# Patient Record
Sex: FEMALE | Race: WHITE | NOT HISPANIC OR LATINO | Employment: UNEMPLOYED | URBAN - NONMETROPOLITAN AREA
[De-identification: names, ages, dates, MRNs, and addresses within clinical notes are randomized per-mention and may not be internally consistent; named-entity substitution may affect disease eponyms.]

---

## 2017-01-03 ENCOUNTER — OFFICE VISIT (OUTPATIENT)
Dept: NEUROLOGY | Facility: CLINIC | Age: 52
End: 2017-01-03

## 2017-01-03 VITALS
BODY MASS INDEX: 26.8 KG/M2 | SYSTOLIC BLOOD PRESSURE: 112 MMHG | HEART RATE: 78 BPM | WEIGHT: 157 LBS | HEIGHT: 64 IN | DIASTOLIC BLOOD PRESSURE: 70 MMHG | OXYGEN SATURATION: 97 %

## 2017-01-03 DIAGNOSIS — G44.229 CHRONIC TENSION-TYPE HEADACHE, NOT INTRACTABLE: ICD-10-CM

## 2017-01-03 DIAGNOSIS — R42 VERTIGO: Primary | ICD-10-CM

## 2017-01-03 PROCEDURE — 99214 OFFICE O/P EST MOD 30 MIN: CPT | Performed by: NURSE PRACTITIONER

## 2017-01-03 RX ORDER — MECLIZINE HYDROCHLORIDE 25 MG/1
25 TABLET ORAL 3 TIMES DAILY PRN
Status: SHIPPED | OUTPATIENT
Start: 2017-01-03

## 2017-01-03 RX ORDER — CHOLECALCIFEROL (VITAMIN D3) 50 MCG
2000 TABLET ORAL DAILY
COMMUNITY

## 2017-01-03 RX ORDER — TIZANIDINE 4 MG/1
4 TABLET ORAL NIGHTLY PRN
Qty: 30 TABLET | Refills: 3 | Status: SHIPPED | OUTPATIENT
Start: 2017-01-03 | End: 2020-08-26

## 2017-01-03 RX ORDER — LISINOPRIL 10 MG/1
10 TABLET ORAL DAILY
COMMUNITY
Start: 2016-12-27 | End: 2021-06-21 | Stop reason: HOSPADM

## 2017-01-03 RX ORDER — AMITRIPTYLINE HYDROCHLORIDE 10 MG/1
10 TABLET, FILM COATED ORAL NIGHTLY
Qty: 30 TABLET | Refills: 4 | Status: SHIPPED | OUTPATIENT
Start: 2017-01-03 | End: 2020-08-26

## 2017-01-03 NOTE — PROGRESS NOTES
Subjective   Shilpa Vides is a 51 y.o. female     Chief Complaint   Patient presents with   • Headache     PRESENT TIMES MANY YEARS. STATES THAT SHE HAS NOT HAD ANY RECENT MEDS.   • Dizziness     STATES THAT DIZZINESS PRESENT X APPROX 1 MO. REPORTS THAT SHE HAS BEEN GIVEN MECLIZINE, IT HELPS SOME. REPORTS THAT SHE HAS HAD RECENT SCANS OF BRAIN AND HEART WORK UP, ALL NEG.        History of Present Illness     Pt is a 51-year-old female in clinic today to establish with [facility] at [facility].  Patient states that she has had headaches for many years they've been onset for greater than 20 years.  She states that they always begin in her neck and radiate up into her head.  She has one daily wakes with a headache.  She has changed her pillows out without any improvement.  States now approximately a month ago she developed dizziness with increased nausea.  She states that it's constant and worse when she lies down.  ET scan here at [facility] and it was negative for any acute intracranial issues.  Patient also had imaging done at [facility] but those results aren't in yet.  Patient states all studies came out negative including her cardiac workup.  Patient does complain of sleep apnea symptoms.  Her [place] score was 14 she does state that she snores and has apneic episodes which her  has witnessed.  Patient states headaches always begin in the back of her neck and she did have a back injury and has had headaches for 20+ years daily pressure from the neck up to the front.  She has switched taking in pillows.  Patient also states that the dizziness she notices is here with her chronic sinusitis and often has to take Augmentin she states currently her smell is changed again.  She will treat with inhalers and no spray and states that she'll see her primary care if it doesn't improve.    Patient has what she states is chronic sinusitis she states that she has pressure in her sinuses with  "a change in her ability to spell.  As been using nasal spray as well as an inhaler.  States that she has been on Augmentin with no improvement.  She does also have decreased and blurred vision however she states that she just learned to her ophthalmologist that she has cataracts.    The following portions of the patient's history were reviewed and updated as appropriate: allergies, current medications, past family history, past medical history, past social history, past surgical history and problem list.    Review of Systems   Constitutional: Positive for activity change and fatigue. Negative for chills and fever.   HENT: Positive for rhinorrhea, sinus pressure, sneezing and tinnitus. Negative for congestion, ear pain, hearing loss and sore throat.    Eyes: Negative for pain, discharge and redness.   Respiratory: Positive for shortness of breath. Negative for cough, wheezing and stridor.    Cardiovascular: Negative.  Negative for chest pain, palpitations and leg swelling.   Gastrointestinal: Negative for abdominal pain, constipation, nausea and vomiting.   Endocrine: Negative for cold intolerance, heat intolerance and polyphagia.   Genitourinary: Negative.  Negative for dysuria, flank pain, frequency and urgency.   Musculoskeletal: Positive for arthralgias, myalgias, neck pain and neck stiffness. Negative for joint swelling.   Skin: Negative for pallor, rash and wound.   Allergic/Immunologic: Negative for environmental allergies.   Neurological: Positive for dizziness, light-headedness and headaches. Negative for tremors, seizures, syncope, facial asymmetry, speech difficulty, weakness and numbness.   Hematological: Negative for adenopathy.   Psychiatric/Behavioral: Negative for confusion and hallucinations. The patient is nervous/anxious.        Objective         Vitals:    01/03/17 0955   BP: 112/70   Pulse: 78   SpO2: 97%   Weight: 157 lb (71.2 kg)   Height: 64\" (162.6 cm)     GENERAL: Patient is pleasant, " cooperative, appears to be stated age.  Body habitus is endomorphic.  SKIN AND EXTREMITIES:  No skin rashes or lesions are noted.  No cyanosis, clubbing or edema of the extremities.    HEAD:  Head is normocephalic and atraumatic.    NECK: Neck are tender posteriorly without thyromegaly or adenopathy.  Carotic upstrokes are 1+/4.  No cranial or cervical bruits.  The neck is supple with a full range of motion.   CARDIOVASCULAR:  Regular rate and rhythm with normal S1 and S2 without rub or gallop.  RESPIRATORY:  Clear to auscultation without wheezes or crackle   ABDOMEN:  Soft and non-tender, positive bowel sound without hepatosplenomegaly  BACK:  Back is straight without midline defect.    PSYCH:  Higher cortical function/mental status:  The patient is alert.  She is oriented x3 to time, place and person.  Recent and the remote memory appear normal.  The patient has a good fund of knowledge.  There is no visual or auditory hallucination or suicidal or homicidal ideation.  SPEECH:There is no gross evidence of aphasia, dysarthria or agnosia.      CRANIAL NERVES:    Visual fields are intact to confrontation testing. + nystagmus noted upon laying down .  The face is symmetric. palate elevate symmetrically, Tongue midline The remainder of the cranial nerves are intact and symmetrical.    MOTOR: Strength is 5/5 throughout with normal tone and bulk with the following exceptions, 4/5 intrinsic muscles of the hands and feet.  No involuntary movements noted.  + tenderness palp cervical and Bilat shoulder with palp  DTR: are 2/4 and symmetrical in the upper extremities, 2/4 and symmetrical at the knees  SENSATION:  Intact to pinprick, light touch, vibration and proprioception.  Coordination:  The patient normally performs finger-nose-finger, heel-to-knee-to-shin and rapid alternating movements in symmetrical fashion.    MUSCULOSKELETAL: Range of motion normal, no clubbing, cyanosis, or edema.  No joint swelling.     Results for  orders placed or performed during the hospital encounter of 17   Basic Metabolic Panel   Result Value Ref Range    Sodium 144 137 - 145 mmol/L    Potassium 4.1 3.5 - 5.1 mmol/L    Chloride 105 98 - 107 mmol/L    CO2 25 (L) 26 - 30 mmol/L    Anion Gap 18 10 - 20 mmol/L    BUN 18 7 - 20 mg/dL    Creatinine 0.6 0.6 - 1.3 mg/dL    eGFR 105 mL/min    Glucose 111 (H) 74 - 98 mg/dL    Calcium 9.8 8.4 - 10.2 mg/dL   Troponin   Result Value Ref Range    Troponin I <0.01 0.00 - 0.05 ng/mL   Troponin   Result Value Ref Range    Troponin I <0.01 0.00 - 0.05 ng/mL   CBC & Differential   Result Value Ref Range    WBC 7.7 4.8 - 10.8 THOUS    RBC 5.03 4.20 - 5.40 m/uL    Hemoglobin 15.3 12.0 - 16.0 g/dL    Hematocrit 45 37 - 47 %    MCV 90.3 81.0 - 99.0 fL    MCH 30.4 27.0 - 31.0 uug    MCHC 33.7 30.0 - 37.0 g/dL    RDW 13.1 11.5 - 14.5 %    Platelets 211 130 - 400 THOUS    Neutrophil Rel % 58.2 37.0 - 80.0 %    Lymphocyte Rel % 32.6 10.0 - 50.0 %    Monocyte Rel % 6.6 0.0 - 12.0 %    Eosinophil Rel % 1.6 0.0 - 7.0 %    Basophil Rel % 0.70 0.00 - 2.50 %    Immature Granulocyte Rel % 0.30 0.00 - 2.50 %    Neutrophils Absolute 4.48 2.00 - 6.90 THOUS    Lymphocytes Absolute 2.51 0.60 - 3.40 THOUS    Monocytes Absolute 0.51 0.00 - 0.90 THOUS    Eosinophils Absolute 0.12 0.00 - 0.70 THOUS    Basophils Absolute 0.05 0.00 - 0.20 THOUS    Abs Imm Gran 0.02 0.00 - 0.60 THOUS       Xr Chest Pa & Lateral    Result Date: 1/3/2017  Narrative:  [address] [address] [phone]     Diagnostic Imaging Report    [id number]     Signed    PATIENT NAME:  RUMA COYLE MRN:  [id number]  :  1965 ACCT#:  [id number]  ATTENDING:   DATE OF EXAM:  17  PRIMARY CARE:   RAVI CABALLERO LOCATION:  ED    ORDERING PHYSICIAN:  ANSHUL JEAN-BAPTISTE  PROCEDURE(s):  CHEST PA   LAT  ORDER NUMBER(s):  [id number]    CC:   RAVI CABALLERO;  ADEBAYO JEAN-BAPTISTE         PROCEDURE: CHEST PA   LAT-           HISTORY: chest congestion       COMPARISON: May 24, 2016.       FINDINGS: The heart is normal in size. The mediastinum is unremarkable.   The lungs are clear. There is no pneumothorax. There are no acute   osseous abnormalities.          IMPRESSION- No acute cardiopulmonary process.              This report was finalized on 1/3/2017 8:22 AM by Anahy Lugo M.D..         DICTATED BY:      ANAHY LUGO MD  DICTATED DATE/TIME:      01/03/17 0821  TRANSCRIBED DATE/TIME:      01/03/17 0822    CC: ANSHUL JEAN-BAPTISTE PA-C; RAVI RASMUSSEN        I have personally reviewed the above labs and imaging studies.    Assessment/Plan      1. Tension headaches: plan trigger point injection 1-2 weeks.     I had a long discussion with the patient regarding treatment options.  We agreed on prophylactic medication treatment and an abortive medication option.  The patient is to avoid analgesic overuse by not using abortive OTC medications more often than once a week.  We decided on the following elavil, trigger point  The patient is to contact the office if side effects occur or if an effective medication dose is found to obtain a script.  We provided samples for the titration schedule and informed about common side effects.    If this approach is ineffective we will consider Botox injections given that the headaches occur on more than 15 days out of the month and last longer than 4 hours each.    2 Vertigo:  With hx sinusitis:  Continue inhaler/nasal spray and meclizine from [facility].  Rx PT for VRT.    3. Cervicalgia/spasms: RX elavil with zanaflex    4. LASHAE:  Pt declines sleep study @ this time    I have counseled patient extensively on Sleep Hygiene including regular sleep wake schedule and stimulus control therapy. I have also discussed the importance of weight reduction because 10% reduction in body weight can reduce sleep apnea by 50 %. We have also discussed abstaining from smoking and drinking.  I have explained to patient  that obstructive apnea episode is defined as the absence of airflow for at least 10 seconds.  Sleep apnea is usually accompanied by snoring, disturbed sleep, and daytime sleepiness. Patients with micrognathia, retrognathia, enlarged tonsils, tongue enlargement, and acromegaly are especially predisposed to obstructive sleep apnea. Abnormalities or weakness in the muscles can also contribute to obstructive sleep apnea. Obesity can also contribute to sleep apnea.  Sleep apnea can lead to a number of complications, ranging from daytime sleepiness to possible increased risk of cardiovascular risks.    Daytime sleepiness is the most serious. Daytime sleepiness can also increase the risk for accident-related injuries. Several studies have suggested that people with sleep apnea have two to three times as many car accidents, and five to seven times the risk for multiple accidents.    A number of cardiovascular diseases -- including high blood pressure, heart failure, stroke, and heart arrhythmias -- have an association with obstructive sleep apnea.    Up to a third of patients with heart failure also have sleep apnea. Both central and obstructive sleep apnea are linked with heart failure. Obstructive sleep apnea is also noted to be associated with type 2 diabetes according to Dr. Ingram at [facility].  The best treatment for symptomatic obstructive sleep apnea is continuous positive airflow pressure (CPAP). Bilevel positive airway pressure (BPAP) systems may be particularly helpful for patients with coexisting lung disease and those with excessive levels of carbon dioxide.  Other treatment options including UPPP surgery, LAUP surgery, radiofrequency somnoplasty and dental appliances such Gideon or Clearway.

## 2017-01-03 NOTE — MR AVS SNAPSHOT
Shilpa SINHA Peewee   1/3/2017 10:00 AM   Office Visit    Dept Phone:  [phone]   Encounter #:  [id number]    Provider:  RAVI Martini   Department:  [facility] NEUROLOGY                Your Full Care Plan              Today's Medication Changes          These changes are accurate as of: 1/3/17 10:40 AM.  If you have any questions, ask your nurse or doctor.               New Medication(s)Ordered:     amitriptyline 10 MG tablet   Commonly known as:  ELAVIL   Take 1 tablet by mouth Every Night.       tiZANidine 4 MG tablet   Commonly known as:  ZANAFLEX   Take 1 tablet by mouth At Night As Needed for muscle spasms.            Where to Get Your Medications      These medications were sent to [facility] [address] [place] [address] - [phone]  - [phone] [address] [address]     Phone:  [phone]     amitriptyline 10 MG tablet    tiZANidine 4 MG tablet                  Your Updated Medication List          This list is accurate as of: 1/3/17 10:40 AM.  Always use your most recent med list.                amitriptyline 10 MG tablet   Commonly known as:  ELAVIL   Take 1 tablet by mouth Every Night.       aspirin 81 MG EC tablet       lisinopril 10 MG tablet   Commonly known as:  PRINIVIL,ZESTRIL       pantoprazole 40 MG EC tablet   Commonly known as:  PROTONIX   1 po daily in the am 30 minutes before breakfast       pravastatin 40 MG tablet   Commonly known as:  PRAVACHOL       tiZANidine 4 MG tablet   Commonly known as:  ZANAFLEX   Take 1 tablet by mouth At Night As Needed for muscle spasms.       Vitamin D 2000 UNITS tablet               We Performed the Following     Ambulatory Referral to Physical Therapy Evaluate and treat       You Were Diagnosed With        Codes Comments    Vertigo    -  Primary ICD-10-CM: R42  ICD-9-CM: 780.4     Chronic tension-type headache, not intractable     ICD-10-CM: G44.229  ICD-9-CM:  "339.12       Medications to be Given to You by a Medical Professional     Due       Frequency    (none) meclizine (ANTIVERT) tablet 25 mg  3 Times Daily PRN      Instructions     None    Patient Instructions History      Upcoming Appointments     Visit Type Date Time Department    NEW PATIENT 1/3/2017 10:00 AM [facility]UR[facility]      MyChart Signup     Our records indicate that you have declined [facility]. If you would like to sign up for [employer], please email [email or web address] or call [phone] to obtain an activation code.             Other Info from Your Visit           Allergies     Flagyl [Metronidazole]  Nausea And Vomiting    Metoprolol        Reason for Visit     Headache PRESENT TIMES MANY YEARS. STATES THAT SHE HAS NOT HAD ANY RECENT MEDS.    Dizziness STATES THAT DIZZINESS PRESENT X APPROX 1 MO. REPORTS THAT SHE HAS BEEN GIVEN MECLIZINE, IT HELPS SOME. REPORTS THAT SHE HAS HAD RECENT SCANS OF BRAIN AND HEART WORK UP, ALL NEG.       Vital Signs     Blood Pressure Pulse Height Weight Oxygen Saturation Body Mass Index    112/70 78 64\" (162.6 cm) 157 lb (71.2 kg) 97% 26.95 kg/m2    Smoking Status                   Never Smoker           Problems and Diagnoses Noted     Vertigo    -  Primary    Chronic tension-type headache, not intractable            "

## 2017-01-11 ENCOUNTER — TRANSCRIBE ORDERS (OUTPATIENT)
Dept: MAMMOGRAPHY | Facility: HOSPITAL | Age: 52
End: 2017-01-11

## 2017-01-11 DIAGNOSIS — Z13.9 SCREENING: Primary | ICD-10-CM

## 2017-01-24 ENCOUNTER — HOSPITAL ENCOUNTER (OUTPATIENT)
Dept: MAMMOGRAPHY | Facility: HOSPITAL | Age: 52
Discharge: HOME | End: 2017-01-24
Admitting: NURSE PRACTITIONER

## 2017-01-24 DIAGNOSIS — Z13.9 SCREENING: ICD-10-CM

## 2017-01-24 PROCEDURE — G0202 SCR MAMMO BI INCL CAD: HCPCS

## 2017-01-24 PROCEDURE — 77063 BREAST TOMOSYNTHESIS BI: CPT

## 2017-05-30 ENCOUNTER — APPOINTMENT (OUTPATIENT)
Dept: GENERAL RADIOLOGY | Facility: HOSPITAL | Age: 52
End: 2017-05-30

## 2017-05-30 ENCOUNTER — HOSPITAL ENCOUNTER (EMERGENCY)
Facility: HOSPITAL | Age: 52
Discharge: HOME | End: 2017-05-30
Attending: EMERGENCY MEDICINE | Admitting: EMERGENCY MEDICINE

## 2017-05-30 VITALS
WEIGHT: 150 LBS | DIASTOLIC BLOOD PRESSURE: 65 MMHG | SYSTOLIC BLOOD PRESSURE: 124 MMHG | BODY MASS INDEX: 25.61 KG/M2 | OXYGEN SATURATION: 98 % | RESPIRATION RATE: 18 BRPM | HEIGHT: 64 IN | TEMPERATURE: 97.8 F | HEART RATE: 71 BPM

## 2017-05-30 DIAGNOSIS — R07.9 CHEST PAIN, UNSPECIFIED TYPE: Primary | ICD-10-CM

## 2017-05-30 LAB
ALBUMIN SERPL-MCNC: 4.5 G/DL (ref 3.5–5)
ALBUMIN/GLOB SERPL: 1.5 G/DL (ref 1–2)
ALP SERPL-CCNC: 77 U/L (ref 38–126)
ALT SERPL W P-5'-P-CCNC: 22 U/L (ref 13–69)
ANION GAP SERPL CALCULATED.3IONS-SCNC: 19.1 MMOL/L
AST SERPL-CCNC: 26 U/L (ref 15–46)
BASOPHILS # BLD AUTO: 0.04 10*3/MM3 (ref 0–0.2)
BASOPHILS NFR BLD AUTO: 0.5 % (ref 0–2.5)
BILIRUB SERPL-MCNC: 0.4 MG/DL (ref 0.2–1.3)
BUN BLD-MCNC: 17 MG/DL (ref 7–20)
BUN/CREAT SERPL: 24.3 (ref 7.1–23.5)
CALCIUM SPEC-SCNC: 10.2 MG/DL (ref 8.4–10.2)
CHLORIDE SERPL-SCNC: 102 MMOL/L (ref 98–107)
CO2 SERPL-SCNC: 26 MMOL/L (ref 26–30)
CREAT BLD-MCNC: 0.7 MG/DL (ref 0.6–1.3)
DEPRECATED RDW RBC AUTO: 40.9 FL (ref 37–54)
EOSINOPHIL # BLD AUTO: 0.1 10*3/MM3 (ref 0–0.7)
EOSINOPHIL NFR BLD AUTO: 1.3 % (ref 0–7)
ERYTHROCYTE [DISTWIDTH] IN BLOOD BY AUTOMATED COUNT: 12.5 % (ref 11.5–14.5)
GFR SERPL CREATININE-BSD FRML MDRD: 88 ML/MIN/1.73
GLOBULIN UR ELPH-MCNC: 3.1 GM/DL
GLUCOSE BLD-MCNC: 106 MG/DL (ref 74–98)
HCT VFR BLD AUTO: 46.5 % (ref 37–47)
HGB BLD-MCNC: 15.2 G/DL (ref 12–16)
HOLD SPECIMEN: NORMAL
HOLD SPECIMEN: NORMAL
IMM GRANULOCYTES # BLD: 0.02 10*3/MM3 (ref 0–0.06)
IMM GRANULOCYTES NFR BLD: 0.3 % (ref 0–0.6)
LYMPHOCYTES # BLD AUTO: 2.44 10*3/MM3 (ref 0.6–3.4)
LYMPHOCYTES NFR BLD AUTO: 31 % (ref 10–50)
MCH RBC QN AUTO: 29.5 PG (ref 27–31)
MCHC RBC AUTO-ENTMCNC: 32.7 G/DL (ref 30–37)
MCV RBC AUTO: 90.3 FL (ref 81–99)
MONOCYTES # BLD AUTO: 0.57 10*3/MM3 (ref 0–0.9)
MONOCYTES NFR BLD AUTO: 7.2 % (ref 0–12)
NEUTROPHILS # BLD AUTO: 4.7 10*3/MM3 (ref 2–6.9)
NEUTROPHILS NFR BLD AUTO: 59.7 % (ref 37–80)
NRBC BLD MANUAL-RTO: 0 /100 WBC (ref 0–0)
PLATELET # BLD AUTO: 222 10*3/MM3 (ref 130–400)
PMV BLD AUTO: 9.4 FL (ref 6–12)
POTASSIUM BLD-SCNC: 4.1 MMOL/L (ref 3.5–5.1)
PROT SERPL-MCNC: 7.6 G/DL (ref 6.3–8.2)
RBC # BLD AUTO: 5.15 10*6/MM3 (ref 4.2–5.4)
SODIUM BLD-SCNC: 143 MMOL/L (ref 137–145)
TROPONIN I SERPL-MCNC: 0 NG/ML (ref 0–0.05)
TROPONIN I SERPL-MCNC: <0.012 NG/ML (ref 0–0.03)
TROPONIN I SERPL-MCNC: <0.012 NG/ML (ref 0–0.03)
WBC NRBC COR # BLD: 7.87 10*3/MM3 (ref 4.8–10.8)
WHOLE BLOOD HOLD SPECIMEN: NORMAL
WHOLE BLOOD HOLD SPECIMEN: NORMAL

## 2017-05-30 PROCEDURE — 84484 ASSAY OF TROPONIN QUANT: CPT | Performed by: PHYSICIAN ASSISTANT

## 2017-05-30 PROCEDURE — 99284 EMERGENCY DEPT VISIT MOD MDM: CPT

## 2017-05-30 PROCEDURE — 80053 COMPREHEN METABOLIC PANEL: CPT | Performed by: PHYSICIAN ASSISTANT

## 2017-05-30 PROCEDURE — 71010 HC CHEST PA OR AP: CPT

## 2017-05-30 PROCEDURE — 93005 ELECTROCARDIOGRAM TRACING: CPT | Performed by: PHYSICIAN ASSISTANT

## 2017-05-30 PROCEDURE — 93005 ELECTROCARDIOGRAM TRACING: CPT | Performed by: EMERGENCY MEDICINE

## 2017-05-30 PROCEDURE — 85025 COMPLETE CBC W/AUTO DIFF WBC: CPT | Performed by: PHYSICIAN ASSISTANT

## 2017-05-30 RX ORDER — SODIUM CHLORIDE 0.9 % (FLUSH) 0.9 %
10 SYRINGE (ML) INJECTION AS NEEDED
Status: DISCONTINUED | OUTPATIENT
Start: 2017-05-30 | End: 2017-05-31 | Stop reason: HOSPADM

## 2017-05-30 RX ORDER — ASPIRIN 325 MG
325 TABLET ORAL ONCE
Status: COMPLETED | OUTPATIENT
Start: 2017-05-30 | End: 2017-05-30

## 2017-05-30 RX ADMIN — ASPIRIN 325 MG ORAL TABLET 325 MG: 325 PILL ORAL at 18:00

## 2019-03-30 ENCOUNTER — APPOINTMENT (OUTPATIENT)
Dept: CT IMAGING | Facility: HOSPITAL | Age: 54
End: 2019-03-30

## 2019-03-30 ENCOUNTER — APPOINTMENT (OUTPATIENT)
Dept: GENERAL RADIOLOGY | Facility: HOSPITAL | Age: 54
End: 2019-03-30

## 2019-03-30 ENCOUNTER — HOSPITAL ENCOUNTER (EMERGENCY)
Facility: HOSPITAL | Age: 54
Discharge: HOME | End: 2019-03-30
Attending: EMERGENCY MEDICINE | Admitting: EMERGENCY MEDICINE

## 2019-03-30 VITALS
RESPIRATION RATE: 16 BRPM | OXYGEN SATURATION: 99 % | HEART RATE: 77 BPM | HEIGHT: 64 IN | WEIGHT: 138 LBS | BODY MASS INDEX: 23.56 KG/M2 | TEMPERATURE: 98 F | DIASTOLIC BLOOD PRESSURE: 65 MMHG | SYSTOLIC BLOOD PRESSURE: 117 MMHG

## 2019-03-30 DIAGNOSIS — R07.89 ATYPICAL CHEST PAIN: Primary | ICD-10-CM

## 2019-03-30 DIAGNOSIS — R42 VERTIGO: ICD-10-CM

## 2019-03-30 LAB
ALBUMIN SERPL-MCNC: 4.6 G/DL (ref 3.5–5)
ALBUMIN/GLOB SERPL: 1.7 G/DL (ref 1–2)
ALP SERPL-CCNC: 70 U/L (ref 38–126)
ALT SERPL W P-5'-P-CCNC: 27 U/L (ref 13–69)
ANION GAP SERPL CALCULATED.3IONS-SCNC: 15 MMOL/L (ref 10–20)
AST SERPL-CCNC: 23 U/L (ref 15–46)
BASOPHILS # BLD AUTO: 0.05 10*3/MM3 (ref 0–0.2)
BASOPHILS NFR BLD AUTO: 0.8 % (ref 0–1.5)
BILIRUB SERPL-MCNC: 0.6 MG/DL (ref 0.2–1.3)
BUN BLD-MCNC: 12 MG/DL (ref 7–20)
BUN/CREAT SERPL: 20 (ref 7.1–23.5)
CALCIUM SPEC-SCNC: 10.3 MG/DL (ref 8.4–10.2)
CHLORIDE SERPL-SCNC: 105 MMOL/L (ref 98–107)
CO2 SERPL-SCNC: 24 MMOL/L (ref 26–30)
CREAT BLD-MCNC: 0.6 MG/DL (ref 0.6–1.3)
DEPRECATED RDW RBC AUTO: 40.1 FL (ref 37–54)
EOSINOPHIL # BLD AUTO: 0.16 10*3/MM3 (ref 0–0.4)
EOSINOPHIL NFR BLD AUTO: 2.4 % (ref 0.3–6.2)
ERYTHROCYTE [DISTWIDTH] IN BLOOD BY AUTOMATED COUNT: 12.2 % (ref 12.3–15.4)
GFR SERPL CREATININE-BSD FRML MDRD: 104 ML/MIN/1.73
GLOBULIN UR ELPH-MCNC: 2.7 GM/DL
GLUCOSE BLD-MCNC: 101 MG/DL (ref 74–98)
HCT VFR BLD AUTO: 43.6 % (ref 34–46.6)
HGB BLD-MCNC: 14.9 G/DL (ref 12–15.9)
HOLD SPECIMEN: NORMAL
HOLD SPECIMEN: NORMAL
IMM GRANULOCYTES # BLD AUTO: 0.02 10*3/MM3 (ref 0–0.05)
IMM GRANULOCYTES NFR BLD AUTO: 0.3 % (ref 0–0.5)
LYMPHOCYTES # BLD AUTO: 2.42 10*3/MM3 (ref 0.7–3.1)
LYMPHOCYTES NFR BLD AUTO: 36.4 % (ref 19.6–45.3)
MCH RBC QN AUTO: 30.6 PG (ref 26.6–33)
MCHC RBC AUTO-ENTMCNC: 34.2 G/DL (ref 31.5–35.7)
MCV RBC AUTO: 89.5 FL (ref 79–97)
MONOCYTES # BLD AUTO: 0.46 10*3/MM3 (ref 0.1–0.9)
MONOCYTES NFR BLD AUTO: 6.9 % (ref 5–12)
NEUTROPHILS # BLD AUTO: 3.54 10*3/MM3 (ref 1.4–7)
NEUTROPHILS NFR BLD AUTO: 53.2 % (ref 42.7–76)
NRBC BLD AUTO-RTO: 0 /100 WBC (ref 0–0)
PLATELET # BLD AUTO: 231 10*3/MM3 (ref 140–450)
PMV BLD AUTO: 9.4 FL (ref 6–12)
POTASSIUM BLD-SCNC: 4 MMOL/L (ref 3.5–5.1)
PROT SERPL-MCNC: 7.3 G/DL (ref 6.3–8.2)
RBC # BLD AUTO: 4.87 10*6/MM3 (ref 3.77–5.28)
SODIUM BLD-SCNC: 140 MMOL/L (ref 137–145)
TROPONIN I SERPL-MCNC: 0.01 NG/ML (ref 0–0.05)
TROPONIN I SERPL-MCNC: <0.012 NG/ML (ref 0–0.03)
WBC NRBC COR # BLD: 6.65 10*3/MM3 (ref 3.4–10.8)
WHOLE BLOOD HOLD SPECIMEN: NORMAL
WHOLE BLOOD HOLD SPECIMEN: NORMAL

## 2019-03-30 PROCEDURE — 80053 COMPREHEN METABOLIC PANEL: CPT | Performed by: EMERGENCY MEDICINE

## 2019-03-30 PROCEDURE — 93005 ELECTROCARDIOGRAM TRACING: CPT | Performed by: EMERGENCY MEDICINE

## 2019-03-30 PROCEDURE — 85025 COMPLETE CBC W/AUTO DIFF WBC: CPT | Performed by: EMERGENCY MEDICINE

## 2019-03-30 PROCEDURE — 25010000002 ONDANSETRON PER 1 MG: Performed by: EMERGENCY MEDICINE

## 2019-03-30 PROCEDURE — 99284 EMERGENCY DEPT VISIT MOD MDM: CPT

## 2019-03-30 PROCEDURE — 84484 ASSAY OF TROPONIN QUANT: CPT | Performed by: EMERGENCY MEDICINE

## 2019-03-30 PROCEDURE — 70450 CT HEAD/BRAIN W/O DYE: CPT

## 2019-03-30 PROCEDURE — 96374 THER/PROPH/DIAG INJ IV PUSH: CPT

## 2019-03-30 PROCEDURE — 71045 X-RAY EXAM CHEST 1 VIEW: CPT

## 2019-03-30 RX ORDER — SODIUM CHLORIDE 0.9 % (FLUSH) 0.9 %
10 SYRINGE (ML) INJECTION AS NEEDED
Status: DISCONTINUED | OUTPATIENT
Start: 2019-03-30 | End: 2019-03-30 | Stop reason: HOSPADM

## 2019-03-30 RX ORDER — ASPIRIN 325 MG
325 TABLET ORAL ONCE
Status: DISCONTINUED | OUTPATIENT
Start: 2019-03-30 | End: 2019-03-30

## 2019-03-30 RX ORDER — ONDANSETRON 4 MG/1
4 TABLET, FILM COATED ORAL EVERY 6 HOURS
Qty: 12 TABLET | Refills: 0 | Status: SHIPPED | OUTPATIENT
Start: 2019-03-30 | End: 2020-08-26

## 2019-03-30 RX ORDER — MECLIZINE HYDROCHLORIDE 25 MG/1
25 TABLET ORAL 4 TIMES DAILY PRN
Qty: 30 TABLET | Refills: 0 | Status: SHIPPED | OUTPATIENT
Start: 2019-03-30

## 2019-03-30 RX ORDER — ONDANSETRON 2 MG/ML
4 INJECTION INTRAMUSCULAR; INTRAVENOUS ONCE
Status: COMPLETED | OUTPATIENT
Start: 2019-03-30 | End: 2019-03-30

## 2019-03-30 RX ORDER — MECLIZINE HYDROCHLORIDE 25 MG/1
25 TABLET ORAL ONCE
Status: COMPLETED | OUTPATIENT
Start: 2019-03-30 | End: 2019-03-30

## 2019-03-30 RX ADMIN — ONDANSETRON 4 MG: 2 INJECTION INTRAMUSCULAR; INTRAVENOUS at 13:15

## 2019-03-30 RX ADMIN — MECLIZINE HYDROCHLORIDE 25 MG: 25 TABLET ORAL at 13:15

## 2020-07-07 ENCOUNTER — TRANSCRIBE ORDERS (OUTPATIENT)
Dept: ADMINISTRATIVE | Facility: HOSPITAL | Age: 55
End: 2020-07-07

## 2020-07-07 DIAGNOSIS — Z12.31 VISIT FOR SCREENING MAMMOGRAM: Primary | ICD-10-CM

## 2020-08-08 ENCOUNTER — HOSPITAL ENCOUNTER (OUTPATIENT)
Dept: MAMMOGRAPHY | Facility: HOSPITAL | Age: 55
Discharge: HOME | End: 2020-08-08
Admitting: NURSE PRACTITIONER

## 2020-08-08 DIAGNOSIS — Z12.31 VISIT FOR SCREENING MAMMOGRAM: ICD-10-CM

## 2020-08-08 PROCEDURE — 77067 SCR MAMMO BI INCL CAD: CPT

## 2020-08-08 PROCEDURE — 77063 BREAST TOMOSYNTHESIS BI: CPT

## 2020-08-26 ENCOUNTER — LAB (OUTPATIENT)
Dept: LAB | Facility: HOSPITAL | Age: 55
End: 2020-08-26

## 2020-08-26 ENCOUNTER — OFFICE VISIT (OUTPATIENT)
Dept: ENDOCRINOLOGY | Facility: CLINIC | Age: 55
End: 2020-08-26

## 2020-08-26 VITALS
HEART RATE: 68 BPM | WEIGHT: 123 LBS | SYSTOLIC BLOOD PRESSURE: 112 MMHG | OXYGEN SATURATION: 97 % | BODY MASS INDEX: 21.79 KG/M2 | HEIGHT: 63 IN | DIASTOLIC BLOOD PRESSURE: 80 MMHG

## 2020-08-26 DIAGNOSIS — E04.1 SOLITARY THYROID NODULE: ICD-10-CM

## 2020-08-26 DIAGNOSIS — E05.90 HYPERTHYROIDISM: Primary | ICD-10-CM

## 2020-08-26 DIAGNOSIS — R79.89 LOW TSH LEVEL: ICD-10-CM

## 2020-08-26 LAB
ALBUMIN SERPL-MCNC: 4.6 G/DL (ref 3.5–5.2)
ALBUMIN/GLOB SERPL: 1.8 G/DL
ALP SERPL-CCNC: 57 U/L (ref 39–117)
ALT SERPL W P-5'-P-CCNC: 6 U/L (ref 1–33)
ANION GAP SERPL CALCULATED.3IONS-SCNC: 8.3 MMOL/L (ref 5–15)
AST SERPL-CCNC: 15 U/L (ref 1–32)
BASOPHILS # BLD AUTO: 0.04 10*3/MM3 (ref 0–0.2)
BASOPHILS NFR BLD AUTO: 0.6 % (ref 0–1.5)
BILIRUB SERPL-MCNC: 0.3 MG/DL (ref 0–1.2)
BUN SERPL-MCNC: 11 MG/DL (ref 6–20)
BUN/CREAT SERPL: 15.5 (ref 7–25)
CALCIUM SPEC-SCNC: 10.2 MG/DL (ref 8.6–10.5)
CHLORIDE SERPL-SCNC: 104 MMOL/L (ref 98–107)
CO2 SERPL-SCNC: 27.7 MMOL/L (ref 22–29)
CREAT SERPL-MCNC: 0.71 MG/DL (ref 0.57–1)
DEPRECATED RDW RBC AUTO: 39.8 FL (ref 37–54)
EOSINOPHIL # BLD AUTO: 0.12 10*3/MM3 (ref 0–0.4)
EOSINOPHIL NFR BLD AUTO: 1.9 % (ref 0.3–6.2)
ERYTHROCYTE [DISTWIDTH] IN BLOOD BY AUTOMATED COUNT: 12 % (ref 12.3–15.4)
GFR SERPL CREATININE-BSD FRML MDRD: 85 ML/MIN/1.73
GLOBULIN UR ELPH-MCNC: 2.6 GM/DL
GLUCOSE SERPL-MCNC: 83 MG/DL (ref 65–99)
HCT VFR BLD AUTO: 42.5 % (ref 34–46.6)
HGB BLD-MCNC: 14.6 G/DL (ref 12–15.9)
IMM GRANULOCYTES # BLD AUTO: 0.01 10*3/MM3 (ref 0–0.05)
IMM GRANULOCYTES NFR BLD AUTO: 0.2 % (ref 0–0.5)
LYMPHOCYTES # BLD AUTO: 2.27 10*3/MM3 (ref 0.7–3.1)
LYMPHOCYTES NFR BLD AUTO: 35.1 % (ref 19.6–45.3)
MCH RBC QN AUTO: 31.3 PG (ref 26.6–33)
MCHC RBC AUTO-ENTMCNC: 34.4 G/DL (ref 31.5–35.7)
MCV RBC AUTO: 91 FL (ref 79–97)
MONOCYTES # BLD AUTO: 0.36 10*3/MM3 (ref 0.1–0.9)
MONOCYTES NFR BLD AUTO: 5.6 % (ref 5–12)
NEUTROPHILS NFR BLD AUTO: 3.67 10*3/MM3 (ref 1.7–7)
NEUTROPHILS NFR BLD AUTO: 56.6 % (ref 42.7–76)
NRBC BLD AUTO-RTO: 0 /100 WBC (ref 0–0.2)
PLATELET # BLD AUTO: 239 10*3/MM3 (ref 140–450)
PMV BLD AUTO: 9.8 FL (ref 6–12)
POTASSIUM SERPL-SCNC: 5 MMOL/L (ref 3.5–5.2)
PROT SERPL-MCNC: 7.2 G/DL (ref 6–8.5)
RBC # BLD AUTO: 4.67 10*6/MM3 (ref 3.77–5.28)
SODIUM SERPL-SCNC: 140 MMOL/L (ref 136–145)
T3FREE SERPL-MCNC: 3.13 PG/ML (ref 2–4.4)
T4 FREE SERPL-MCNC: 1.32 NG/DL (ref 0.93–1.7)
TSH SERPL DL<=0.05 MIU/L-ACNC: 0.18 UIU/ML (ref 0.27–4.2)
WBC # BLD AUTO: 6.47 10*3/MM3 (ref 3.4–10.8)

## 2020-08-26 PROCEDURE — 99243 OFF/OP CNSLTJ NEW/EST LOW 30: CPT | Performed by: INTERNAL MEDICINE

## 2020-08-26 PROCEDURE — 80053 COMPREHEN METABOLIC PANEL: CPT | Performed by: INTERNAL MEDICINE

## 2020-08-26 PROCEDURE — 85025 COMPLETE CBC W/AUTO DIFF WBC: CPT | Performed by: INTERNAL MEDICINE

## 2020-08-26 PROCEDURE — 86376 MICROSOMAL ANTIBODY EACH: CPT | Performed by: INTERNAL MEDICINE

## 2020-08-26 PROCEDURE — 84481 FREE ASSAY (FT-3): CPT | Performed by: INTERNAL MEDICINE

## 2020-08-26 PROCEDURE — 84445 ASSAY OF TSI GLOBULIN: CPT | Performed by: INTERNAL MEDICINE

## 2020-08-26 PROCEDURE — 84439 ASSAY OF FREE THYROXINE: CPT | Performed by: INTERNAL MEDICINE

## 2020-08-26 PROCEDURE — 84443 ASSAY THYROID STIM HORMONE: CPT | Performed by: INTERNAL MEDICINE

## 2020-08-26 PROCEDURE — 86800 THYROGLOBULIN ANTIBODY: CPT | Performed by: INTERNAL MEDICINE

## 2020-08-26 NOTE — PROGRESS NOTES
Shilpa Vides 54 y.o.  CC:   Chief Complaint   Patient presents with   • Thyroid Problem     New Patient referred by RAVI Hill for evaluation and management of abnormal thyroid testing        [place]: Thyroid Problem (New Patient referred by RAVI Hill for evaluation and management of abnormal thyroid testing )  noted 1-2 months of dry skin and weight loss   Reviewed outside lab work and TSH is low 8/20 was 0.218   Also has had Headache, dizziness and back pain (had MRI yesterday and is awaiting results)  States h/o thyroid nodule - fna benign (in [place])- remote past   Is not on any supplement  Denies thyroid pain   bp is good     Allergies   Allergen Reactions   • Flagyl [Metronidazole] Nausea And Vomiting   • Metoprolol        Current Outpatient Medications:   •  aspirin 81 MG EC tablet, Take 81 mg by mouth daily., Disp: , Rfl:   •  Cholecalciferol (VITAMIN D) 2000 UNITS tablet, Take 2,000 Units by mouth Daily., Disp: , Rfl:   •  lisinopril (PRINIVIL,ZESTRIL) 10 MG tablet, , Disp: , Rfl:   •  meclizine (ANTIVERT) 25 MG tablet, Take 1 tablet by mouth 4 (Four) Times a Day As Needed for dizziness., Disp: 30 tablet, Rfl: 0  •  pravastatin (PRAVACHOL) 40 MG tablet, Take 40 mg by mouth daily., Disp: , Rfl:     Current Facility-Administered Medications:   •  meclizine (ANTIVERT) tablet 25 mg, 25 mg, Oral, TID PRN, Lucila Slaughter APRN  Patient Active Problem List    Diagnosis   • Low TSH level [R79.89]   • Solitary thyroid nodule [E04.1]   • Epigastric pain [R10.13]   • Heartburn [R12]   • Nausea [R11.0]   • Diarrhea [R19.7]   • Constipation [K59.00]     Review of Systems   Constitutional: Positive for fatigue and unexpected weight change. Negative for activity change, appetite change, chills, diaphoresis and fever.   HENT: Negative for congestion, dental problem, drooling, ear discharge, ear pain, facial swelling, hearing loss, mouth sores, nosebleeds, postnasal drip, rhinorrhea, sinus  pressure, sneezing, sore throat, tinnitus, trouble swallowing and voice change.    Eyes: Positive for visual disturbance. Negative for photophobia, pain, discharge, redness and itching.   Respiratory: Negative for apnea, cough, choking, chest tightness, shortness of breath, wheezing and stridor.    Cardiovascular: Negative for chest pain, palpitations and leg swelling.   Gastrointestinal: Positive for nausea. Negative for abdominal distention, abdominal pain, anal bleeding, blood in stool, constipation, diarrhea, rectal pain and vomiting.   Endocrine: Negative for cold intolerance, heat intolerance, polydipsia, polyphagia and polyuria.   Genitourinary: Negative for decreased urine volume, difficulty urinating, dysuria, enuresis, flank pain, frequency, genital sores, hematuria and urgency.   Musculoskeletal: Negative for arthralgias, back pain, gait problem, joint swelling, myalgias, neck pain and neck stiffness.   Skin: Negative for color change, pallor, rash and wound.   Allergic/Immunologic: Negative for environmental allergies, food allergies and immunocompromised state.   Neurological: Positive for dizziness and headaches. Negative for tremors, seizures, syncope, facial asymmetry, speech difficulty, weakness, light-headedness and numbness.   Hematological: Negative for adenopathy. Bruises/bleeds easily.   Psychiatric/Behavioral: Negative for agitation, behavioral problems, confusion, decreased concentration, dysphoric mood, hallucinations, self-injury, sleep disturbance and suicidal ideas. The patient is not nervous/anxious and is not hyperactive.      Social History     Socioeconomic History   • Marital status:      Spouse name: Not on file   • Number of children: Not on file   • Years of education: Not on file   • Highest education level: Not on file   Tobacco Use   • Smoking status: Never Smoker   • Smokeless tobacco: Never Used   Substance and Sexual Activity   • Alcohol use: No   • Drug use: No   •  "Sexual activity: Defer     Family History   Problem Relation Age of Onset   • Heart disease Mother    • Diabetes Mother    • Breast cancer Mother    • Heart disease Father    • Diabetes Father    • Colon cancer Neg Hx    • Esophageal cancer Neg Hx    • Liver cancer Neg Hx    • Liver disease Neg Hx    • Stomach cancer Neg Hx      /80 (BP Location: Left arm, Patient Position: Sitting, Cuff Size: Adult)   Pulse 68   Ht 160 cm (63\")   Wt 55.8 kg (123 lb)   SpO2 97%   Breastfeeding No   BMI 21.79 kg/m²   Physical Exam   Constitutional: She is oriented to person, place, and time. She appears well-developed and well-nourished.   HENT:   Head: Normocephalic and atraumatic.   Nose: Nose normal.   Mouth/Throat: Oropharynx is clear and moist.   Eyes: Pupils are equal, round, and reactive to light. Conjunctivae, EOM and lids are normal.   Neck: Trachea normal and normal range of motion. Neck supple. Carotid bruit is not present. No tracheal deviation present. Thyromegaly (right goiter ) present. No thyroid mass present.   Cardiovascular: Normal rate, regular rhythm, normal heart sounds and intact distal pulses. Exam reveals no gallop and no friction rub.   No murmur heard.  Pulmonary/Chest: Effort normal and breath sounds normal. No respiratory distress. She has no wheezes.   Musculoskeletal: Normal range of motion. She exhibits no edema or deformity.   Lymphadenopathy:     She has no cervical adenopathy.   Neurological: She is alert and oriented to person, place, and time. She has normal reflexes. She displays normal reflexes. No cranial nerve deficit.   Skin: Skin is warm and dry. No rash noted. No cyanosis or erythema. Nails show no clubbing.   Psychiatric: She has a normal mood and affect. Her speech is normal and behavior is normal. Judgment and thought content normal. Cognition and memory are normal.   Nursing note and vitals reviewed.    Results for orders placed or performed during the hospital encounter of " 03/30/19   Comprehensive Metabolic Panel   Result Value Ref Range    Glucose 101 (H) 74 - 98 mg/dL    BUN 12 7 - 20 mg/dL    Creatinine 0.60 0.60 - 1.30 mg/dL    Sodium 140 137 - 145 mmol/L    Potassium 4.0 3.5 - 5.1 mmol/L    Chloride 105 98 - 107 mmol/L    CO2 24.0 (L) 26.0 - 30.0 mmol/L    Calcium 10.3 (H) 8.4 - 10.2 mg/dL    Total Protein 7.3 6.3 - 8.2 g/dL    Albumin 4.60 3.50 - 5.00 g/dL    ALT (SGPT) 27 13 - 69 U/L    AST (SGOT) 23 15 - 46 U/L    Alkaline Phosphatase 70 38 - 126 U/L    Total Bilirubin 0.6 0.2 - 1.3 mg/dL    eGFR Non African Amer 104 >60 mL/min/1.73    Globulin 2.7 gm/dL    A/G Ratio 1.7 1.0 - 2.0 g/dL    BUN/Creatinine Ratio 20.0 7.1 - 23.5    Anion Gap 15.0 10.0 - 20.0 mmol/L   Troponin I-Stat   Result Value Ref Range    Troponin I 0.010 0.000 - 0.050 ng/mL   Troponin   Result Value Ref Range    Troponin I <0.012 0.000 - 0.034 ng/mL   CBC Auto Differential   Result Value Ref Range    WBC 6.65 3.40 - 10.80 10*3/mm3    RBC 4.87 3.77 - 5.28 10*6/mm3    Hemoglobin 14.9 12.0 - 15.9 g/dL    Hematocrit 43.6 34.0 - 46.6 %    MCV 89.5 79.0 - 97.0 fL    MCH 30.6 26.6 - 33.0 pg    MCHC 34.2 31.5 - 35.7 g/dL    RDW 12.2 (L) 12.3 - 15.4 %    RDW-SD 40.1 37.0 - 54.0 fl    MPV 9.4 6.0 - 12.0 fL    Platelets 231 140 - 450 10*3/mm3    Neutrophil % 53.2 42.7 - 76.0 %    Lymphocyte % 36.4 19.6 - 45.3 %    Monocyte % 6.9 5.0 - 12.0 %    Eosinophil % 2.4 0.3 - 6.2 %    Basophil % 0.8 0.0 - 1.5 %    Immature Grans % 0.3 0.0 - 0.5 %    Neutrophils, Absolute 3.54 1.40 - 7.00 10*3/mm3    Lymphocytes, Absolute 2.42 0.70 - 3.10 10*3/mm3    Monocytes, Absolute 0.46 0.10 - 0.90 10*3/mm3    Eosinophils, Absolute 0.16 0.00 - 0.40 10*3/mm3    Basophils, Absolute 0.05 0.00 - 0.20 10*3/mm3    Immature Grans, Absolute 0.02 0.00 - 0.05 10*3/mm3    nRBC 0.0 0.0 - 0.0 /100 WBC   Light Blue Top   Result Value Ref Range    Extra Tube hold for add-on    Lavender Top   Result Value Ref Range    Extra Tube hold for add-on    Gold Top  - SST   Result Value Ref Range    Extra Tube Hold for add-ons.    Green Top (No Gel)   Result Value Ref Range    Extra Tube Hold for add-ons.      Shilpa was seen today for thyroid problem.    Diagnoses and all orders for this visit:    Hyperthyroidism  -     CBC Auto Differential  -     Comprehensive Metabolic Panel  -     T4, Free  -     TSH  -     Thyroid Stimulating Immunoglobulin  -     Thyroid Antibodies  -     T3, Free  -     US Thyroid    Low TSH level    Solitary thyroid nodule      Problem List Items Addressed This Visit        Endocrine    Solitary thyroid nodule     Check u/s - prefers stephan  If nodule will need uptake and scan due to low tsh             Other    Low TSH level     Check tfts, tsi, thyroid antibodies           Other Visit Diagnoses     Hyperthyroidism    -  Primary    Relevant Orders    CBC Auto Differential    Comprehensive Metabolic Panel    T4, Free    TSH    Thyroid Stimulating Immunoglobulin    Thyroid Antibodies    T3, Free    US Thyroid        Return in about 10 weeks (around 11/4/2020) for Recheck.    Marielena Tineo MD  Signed Marielena Tineo MD

## 2020-08-28 LAB
THYROGLOB AB SERPL-ACNC: <1 IU/ML (ref 0–0.9)
THYROPEROXIDASE AB SERPL-ACNC: 92 IU/ML (ref 0–34)
TSI SER-MCNC: <0.1 IU/L (ref 0–0.55)

## 2020-09-02 ENCOUNTER — HOSPITAL ENCOUNTER (OUTPATIENT)
Dept: ULTRASOUND IMAGING | Facility: HOSPITAL | Age: 55
Discharge: HOME | End: 2020-09-02
Admitting: INTERNAL MEDICINE

## 2020-09-02 PROCEDURE — 76536 US EXAM OF HEAD AND NECK: CPT

## 2020-09-04 ENCOUNTER — TELEPHONE (OUTPATIENT)
Dept: ENDOCRINOLOGY | Facility: CLINIC | Age: 55
End: 2020-09-04

## 2020-09-04 DIAGNOSIS — E05.20 TOXIC MULTINODULAR GOITER: Primary | ICD-10-CM

## 2020-09-29 ENCOUNTER — HOSPITAL ENCOUNTER (OUTPATIENT)
Dept: NUCLEAR MEDICINE | Facility: HOSPITAL | Age: 55
Discharge: HOME | End: 2020-09-29

## 2020-09-29 DIAGNOSIS — E05.20 TOXIC MULTINODULAR GOITER: ICD-10-CM

## 2020-09-29 PROCEDURE — 78014 THYROID IMAGING W/BLOOD FLOW: CPT

## 2020-09-29 PROCEDURE — A9516 IODINE I-123 SOD IODIDE MIC: HCPCS | Performed by: INTERNAL MEDICINE

## 2020-09-29 PROCEDURE — 0 SODIUM IODIDE 3.7 MBQ CAPSULE: Performed by: INTERNAL MEDICINE

## 2020-09-29 RX ORDER — SODIUM IODIDE I 123 100 UCI/1
1 CAPSULE, GELATIN COATED ORAL
Status: COMPLETED | OUTPATIENT
Start: 2020-09-29 | End: 2020-09-29

## 2020-09-29 RX ADMIN — SODIUM IODIDE I 123 1 CAPSULE: 100 CAPSULE, GELATIN COATED ORAL at 07:30

## 2020-09-30 ENCOUNTER — HOSPITAL ENCOUNTER (OUTPATIENT)
Dept: NUCLEAR MEDICINE | Facility: HOSPITAL | Age: 55
Discharge: HOME | End: 2020-09-30

## 2020-10-19 ENCOUNTER — TELEPHONE (OUTPATIENT)
Dept: ENDOCRINOLOGY | Facility: CLINIC | Age: 55
End: 2020-10-19

## 2020-10-21 PROBLEM — E05.20 TOXIC MULTINODULAR GOITER: Status: ACTIVE | Noted: 2020-10-21

## 2020-10-23 NOTE — TELEPHONE ENCOUNTER
Pt was advised with the results of the u/s and scan and uptake according to the result letter  She is concerned that her next Ov is 12/2 and her insurance will run out the end of the year so she would like to be seen sooner so if treatment is needed it can be done before the end of the year  OV was moved up to 11-3-2020 at 3:15

## 2020-11-03 ENCOUNTER — LAB (OUTPATIENT)
Dept: LAB | Facility: HOSPITAL | Age: 55
End: 2020-11-03

## 2020-11-03 ENCOUNTER — OFFICE VISIT (OUTPATIENT)
Dept: ENDOCRINOLOGY | Facility: CLINIC | Age: 55
End: 2020-11-03

## 2020-11-03 VITALS
BODY MASS INDEX: 20.86 KG/M2 | SYSTOLIC BLOOD PRESSURE: 104 MMHG | HEART RATE: 84 BPM | HEIGHT: 64 IN | WEIGHT: 122.2 LBS | OXYGEN SATURATION: 98 % | DIASTOLIC BLOOD PRESSURE: 64 MMHG

## 2020-11-03 DIAGNOSIS — E05.20 TOXIC MULTINODULAR GOITER: ICD-10-CM

## 2020-11-03 DIAGNOSIS — E05.90 HYPERTHYROIDISM: ICD-10-CM

## 2020-11-03 DIAGNOSIS — E04.1 THYROID NODULE, HOT: Primary | ICD-10-CM

## 2020-11-03 PROCEDURE — 84481 FREE ASSAY (FT-3): CPT | Performed by: INTERNAL MEDICINE

## 2020-11-03 PROCEDURE — 84439 ASSAY OF FREE THYROXINE: CPT | Performed by: INTERNAL MEDICINE

## 2020-11-03 PROCEDURE — 84443 ASSAY THYROID STIM HORMONE: CPT | Performed by: INTERNAL MEDICINE

## 2020-11-03 PROCEDURE — 99213 OFFICE O/P EST LOW 20 MIN: CPT | Performed by: INTERNAL MEDICINE

## 2020-11-03 RX ORDER — DIAZEPAM 5 MG/1
5 TABLET ORAL DAILY PRN
COMMUNITY
Start: 2020-09-02

## 2020-11-03 RX ORDER — METHIMAZOLE 5 MG/1
2.5 TABLET ORAL DAILY
Qty: 15 TABLET | Refills: 5 | Status: SHIPPED | OUTPATIENT
Start: 2020-11-03 | End: 2020-12-08

## 2020-11-03 NOTE — PROGRESS NOTES
Shilpa S Peewee 55 y.o.  CC:Follow-up, Hyperthyroidism, and Thyroid Nodule      [place]: Follow-up, Hyperthyroidism, and Thyroid Nodule    Is on no medication currently for thyroid suppression   Reviewed tft with normal t3 and t4 but low tsh   mng- by u/s- discussed nodules   Clear left sided hot nodule   Right sided nodules appear to have normal uptake - not cold  Management options including thyroid suppression with methimazole and close f/u of nodules vs thyroidectomy discussed  She is no sure which option she would prefer   She is afraid of taking medication and we discussed that medication would likely be a part of her therapy whether or not she has surgery     Allergies   Allergen Reactions   • Flagyl [Metronidazole] Nausea And Vomiting   • Metoprolol        Current Outpatient Medications:   •  aspirin 81 MG EC tablet, Take 81 mg by mouth daily., Disp: , Rfl:   •  Cholecalciferol (VITAMIN D) 2000 UNITS tablet, Take 2,000 Units by mouth Daily., Disp: , Rfl:   •  diazePAM (VALIUM) 5 MG tablet, Take 5 mg by mouth Daily As Needed., Disp: , Rfl:   •  lisinopril (PRINIVIL,ZESTRIL) 10 MG tablet, , Disp: , Rfl:   •  meclizine (ANTIVERT) 25 MG tablet, Take 1 tablet by mouth 4 (Four) Times a Day As Needed for dizziness., Disp: 30 tablet, Rfl: 0  •  pravastatin (PRAVACHOL) 40 MG tablet, Take 40 mg by mouth daily., Disp: , Rfl:     Current Facility-Administered Medications:   •  meclizine (ANTIVERT) tablet 25 mg, 25 mg, Oral, TID PRN, Lucila lSaughter, APRN  Patient Active Problem List    Diagnosis   • Toxic multinodular goiter [E05.20]   • Low TSH level [R79.89]   • Solitary thyroid nodule [E04.1]   • Epigastric pain [R10.13]   • Heartburn [R12]   • Nausea [R11.0]   • Diarrhea [R19.7]   • Constipation [K59.00]     Review of Systems   Constitutional: Positive for activity change and unexpected weight change. Negative for appetite change, chills, diaphoresis, fatigue and fever.   HENT: Negative for congestion, dental  problem, drooling, ear discharge, ear pain, facial swelling, hearing loss, mouth sores, nosebleeds, postnasal drip, rhinorrhea, sinus pressure, sneezing, sore throat, tinnitus, trouble swallowing and voice change.    Eyes: Negative for photophobia, pain, discharge, redness, itching and visual disturbance.   Respiratory: Negative for apnea, cough, choking, chest tightness, shortness of breath, wheezing and stridor.    Cardiovascular: Positive for palpitations. Negative for chest pain and leg swelling.   Gastrointestinal: Negative for abdominal distention, abdominal pain, anal bleeding, blood in stool, constipation, diarrhea, nausea, rectal pain and vomiting.   Endocrine: Negative for cold intolerance, heat intolerance, polydipsia, polyphagia and polyuria.   Genitourinary: Negative for decreased urine volume, difficulty urinating, dysuria, enuresis, flank pain, frequency, genital sores, hematuria and urgency.   Musculoskeletal: Negative for arthralgias, back pain, gait problem, joint swelling, myalgias, neck pain and neck stiffness.   Skin: Negative for color change, pallor, rash and wound.   Allergic/Immunologic: Negative for environmental allergies, food allergies and immunocompromised state.   Neurological: Positive for weakness. Negative for dizziness, tremors, seizures, syncope, facial asymmetry, speech difficulty, light-headedness, numbness and headaches.   Hematological: Negative for adenopathy. Does not bruise/bleed easily.   Psychiatric/Behavioral: Negative for agitation, behavioral problems, confusion, decreased concentration, dysphoric mood, hallucinations, self-injury, sleep disturbance and suicidal ideas. The patient is nervous/anxious. The patient is not hyperactive.      Social History     Socioeconomic History   • Marital status:      Spouse name: Not on file   • Number of children: Not on file   • Years of education: Not on file   • Highest education level: Not on file   Tobacco Use   • Smoking  "status: Never Smoker   • Smokeless tobacco: Never Used   Substance and Sexual Activity   • Alcohol use: No   • Drug use: No   • Sexual activity: Defer     Family History   Problem Relation Age of Onset   • Heart disease Mother    • Diabetes Mother    • Breast cancer Mother    • Heart disease Father    • Diabetes Father    • Colon cancer Neg Hx    • Esophageal cancer Neg Hx    • Liver cancer Neg Hx    • Liver disease Neg Hx    • Stomach cancer Neg Hx      /64   Pulse 84   Ht 162.6 cm (64\")   Wt 55.4 kg (122 lb 3.2 oz)   SpO2 98%   BMI 20.98 kg/m²   Physical Exam  Constitutional:       Appearance: Normal appearance.   HENT:      Head: Normocephalic and atraumatic.   Eyes:      Extraocular Movements: Extraocular movements intact.      Pupils: Pupils are equal, round, and reactive to light.      Comments: No stare or lid lag    Neck:      Musculoskeletal: Normal range of motion and neck supple.   Cardiovascular:      Rate and Rhythm: Normal rate and regular rhythm.      Pulses: Normal pulses.      Heart sounds: No murmur.   Pulmonary:      Effort: Pulmonary effort is normal. No respiratory distress.      Breath sounds: Normal breath sounds.   Musculoskeletal: Normal range of motion.         General: No swelling or tenderness.   Skin:     General: Skin is warm and dry.   Neurological:      General: No focal deficit present.      Mental Status: She is alert and oriented to person, place, and time.      Comments: Mild tremor    Psychiatric:         Mood and Affect: Mood normal.         Behavior: Behavior normal.       Results for orders placed or performed in visit on 08/26/20   CBC Auto Differential    Specimen: Blood   Result Value Ref Range    WBC 6.47 3.40 - 10.80 10*3/mm3    RBC 4.67 3.77 - 5.28 10*6/mm3    Hemoglobin 14.6 12.0 - 15.9 g/dL    Hematocrit 42.5 34.0 - 46.6 %    MCV 91.0 79.0 - 97.0 fL    MCH 31.3 26.6 - 33.0 pg    MCHC 34.4 31.5 - 35.7 g/dL    RDW 12.0 (L) 12.3 - 15.4 %    RDW-SD 39.8 37.0 - " 54.0 fl    MPV 9.8 6.0 - 12.0 fL    Platelets 239 140 - 450 10*3/mm3    Neutrophil % 56.6 42.7 - 76.0 %    Lymphocyte % 35.1 19.6 - 45.3 %    Monocyte % 5.6 5.0 - 12.0 %    Eosinophil % 1.9 0.3 - 6.2 %    Basophil % 0.6 0.0 - 1.5 %    Immature Grans % 0.2 0.0 - 0.5 %    Neutrophils, Absolute 3.67 1.70 - 7.00 10*3/mm3    Lymphocytes, Absolute 2.27 0.70 - 3.10 10*3/mm3    Monocytes, Absolute 0.36 0.10 - 0.90 10*3/mm3    Eosinophils, Absolute 0.12 0.00 - 0.40 10*3/mm3    Basophils, Absolute 0.04 0.00 - 0.20 10*3/mm3    Immature Grans, Absolute 0.01 0.00 - 0.05 10*3/mm3    nRBC 0.0 0.0 - 0.2 /100 WBC   Comprehensive Metabolic Panel    Specimen: Blood   Result Value Ref Range    Glucose 83 65 - 99 mg/dL    BUN 11 6 - 20 mg/dL    Creatinine 0.71 0.57 - 1.00 mg/dL    Sodium 140 136 - 145 mmol/L    Potassium 5.0 3.5 - 5.2 mmol/L    Chloride 104 98 - 107 mmol/L    CO2 27.7 22.0 - 29.0 mmol/L    Calcium 10.2 8.6 - 10.5 mg/dL    Total Protein 7.2 6.0 - 8.5 g/dL    Albumin 4.60 3.50 - 5.20 g/dL    ALT (SGPT) 6 1 - 33 U/L    AST (SGOT) 15 1 - 32 U/L    Alkaline Phosphatase 57 39 - 117 U/L    Total Bilirubin 0.3 0.0 - 1.2 mg/dL    eGFR Non African Amer 85 >60 mL/min/1.73    Globulin 2.6 gm/dL    A/G Ratio 1.8 g/dL    BUN/Creatinine Ratio 15.5 7.0 - 25.0    Anion Gap 8.3 5.0 - 15.0 mmol/L   T4, Free    Specimen: Blood   Result Value Ref Range    Free T4 1.32 0.93 - 1.70 ng/dL   TSH    Specimen: Blood   Result Value Ref Range    TSH 0.185 (L) 0.270 - 4.200 uIU/mL   Thyroid Stimulating Immunoglobulin    Specimen: Blood   Result Value Ref Range    Thyroid Stimulating Immunoglobulin <0.10 0.00 - 0.55 IU/L   Thyroid Antibodies    Specimen: Blood   Result Value Ref Range    Thyroid Peroxidase Antibody 92 (H) 0 - 34 IU/mL    Thyroglobulin Ab <1.0 0.0 - 0.9 IU/mL   T3, Free    Specimen: Blood   Result Value Ref Range    T3, Free 3.13 2.00 - 4.40 pg/mL     Problems Addressed this Visit        Endocrine    Toxic multinodular goiter     Hot  left nodule, warm right nodules   Repeat u/s 6 months to 1 year  She will have insurance coverage until 12/20  If procedure she would like done prior to 1/1/21  Pros and cons of options for therapy discussed   F/u 6-8 weeks          Relevant Medications    methIMAzole (TAPAZOLE) 5 MG tablet    Hyperthyroidism - Primary     Start methimazole 2.5 mg daily   Discussed pros and cons of options for therapy including antithyroid medication, CARMONA and surgery   Trial low dose anti thyroid medication   Update lab work in 8 weeks          Relevant Medications    methIMAzole (TAPAZOLE) 5 MG tablet      Diagnoses       Codes Comments    Thyroid nodule, hot    -  Primary ICD-10-CM: E04.1  ICD-9-CM: 241.0     Toxic multinodular goiter     ICD-10-CM: E05.20  ICD-9-CM: 242.20     Hyperthyroidism     ICD-10-CM: E05.90  ICD-9-CM: 242.90         Return in about 6 weeks (around 12/15/2020) for Recheck.    Laura Zurita MA  Signed Marielena Tineo MD

## 2020-11-04 LAB
T3FREE SERPL-MCNC: 3.47 PG/ML (ref 2–4.4)
T4 FREE SERPL-MCNC: 1.35 NG/DL (ref 0.93–1.7)
TSH SERPL DL<=0.05 MIU/L-ACNC: 0.3 UIU/ML (ref 0.27–4.2)

## 2020-11-04 NOTE — ASSESSMENT & PLAN NOTE
Start methimazole 2.5 mg daily   Discussed pros and cons of options for therapy including antithyroid medication, CARMONA and surgery   Trial low dose anti thyroid medication   Update lab work in 8 weeks

## 2020-11-04 NOTE — ASSESSMENT & PLAN NOTE
Hot left nodule, warm right nodules   Repeat u/s 6 months to 1 year  She will have insurance coverage until 12/20  If procedure she would like done prior to 1/1/21  Pros and cons of options for therapy discussed   F/u 6-8 weeks

## 2020-11-18 ENCOUNTER — TELEPHONE (OUTPATIENT)
Dept: ENDOCRINOLOGY | Facility: CLINIC | Age: 55
End: 2020-11-18

## 2020-11-18 NOTE — TELEPHONE ENCOUNTER
Called and review lab results with patient. Dr. Tineo advised her to stop the methimazole because labs were ok. She was just confirming that was the correct step. Reassured patient that medication needs to be stopped per Dr. Tineo and that labs will be rechecked in 6 weeks.

## 2020-12-08 ENCOUNTER — OFFICE VISIT (OUTPATIENT)
Dept: ENDOCRINOLOGY | Facility: CLINIC | Age: 55
End: 2020-12-08

## 2020-12-08 ENCOUNTER — LAB (OUTPATIENT)
Dept: LAB | Facility: HOSPITAL | Age: 55
End: 2020-12-08

## 2020-12-08 VITALS
SYSTOLIC BLOOD PRESSURE: 102 MMHG | DIASTOLIC BLOOD PRESSURE: 60 MMHG | HEART RATE: 72 BPM | HEIGHT: 64 IN | BODY MASS INDEX: 21.34 KG/M2 | OXYGEN SATURATION: 99 % | WEIGHT: 125 LBS

## 2020-12-08 DIAGNOSIS — E05.20 TOXIC MULTINODULAR GOITER: ICD-10-CM

## 2020-12-08 DIAGNOSIS — E05.90 HYPERTHYROIDISM: Primary | ICD-10-CM

## 2020-12-08 PROCEDURE — 84443 ASSAY THYROID STIM HORMONE: CPT | Performed by: INTERNAL MEDICINE

## 2020-12-08 PROCEDURE — 84439 ASSAY OF FREE THYROXINE: CPT | Performed by: INTERNAL MEDICINE

## 2020-12-08 PROCEDURE — 99213 OFFICE O/P EST LOW 20 MIN: CPT | Performed by: INTERNAL MEDICINE

## 2020-12-08 NOTE — PROGRESS NOTES
Shilpa Vides 55 y.o.  CC:Follow-up, Hyperthyroidism, and thyroid nodule    [place]: Follow-up, Hyperthyroidism, and thyroid nodule    Is on no medication currently   bp is good   Reviewed u/s consistent with MNG   Also reviewed nuclear study showing hot nodule on left (lower lobe)  No cold nodules noted   Most recent tfts reviewed and are improved  Discussed possible intermittent nature of thyrotoxicosis related to toxic nodule     Allergies   Allergen Reactions   • Flagyl [Metronidazole] Nausea And Vomiting   • Metoprolol        Current Outpatient Medications:   •  aspirin 81 MG EC tablet, Take 81 mg by mouth daily., Disp: , Rfl:   •  Cholecalciferol (VITAMIN D) 2000 UNITS tablet, Take 2,000 Units by mouth Daily., Disp: , Rfl:   •  diazePAM (VALIUM) 5 MG tablet, Take 5 mg by mouth Daily As Needed., Disp: , Rfl:   •  lisinopril (PRINIVIL,ZESTRIL) 10 MG tablet, , Disp: , Rfl:   •  meclizine (ANTIVERT) 25 MG tablet, Take 1 tablet by mouth 4 (Four) Times a Day As Needed for dizziness., Disp: 30 tablet, Rfl: 0  •  pravastatin (PRAVACHOL) 40 MG tablet, Take 40 mg by mouth daily., Disp: , Rfl:     Current Facility-Administered Medications:   •  meclizine (ANTIVERT) tablet 25 mg, 25 mg, Oral, TID PRN, Lucila Slaughter, RAVI  Patient Active Problem List    Diagnosis   • Toxic multinodular goiter [E05.20]   • Low TSH level [R79.89]   • Hyperthyroidism [E05.90]   • Epigastric pain [R10.13]   • Heartburn [R12]   • Nausea [R11.0]   • Diarrhea [R19.7]   • Constipation [K59.00]     Review of Systems   Constitutional: Positive for unexpected weight change. Negative for activity change, appetite change, chills, diaphoresis, fatigue and fever.   HENT: Negative for congestion, dental problem, drooling, ear discharge, ear pain, facial swelling, hearing loss, mouth sores, nosebleeds, postnasal drip, rhinorrhea, sinus pressure, sneezing, sore throat, tinnitus, trouble swallowing and voice change.    Eyes: Negative for photophobia,  pain, discharge, redness, itching and visual disturbance.   Respiratory: Negative for apnea, cough, choking, chest tightness, shortness of breath, wheezing and stridor.    Cardiovascular: Negative for chest pain, palpitations and leg swelling.   Gastrointestinal: Negative for abdominal distention, abdominal pain, anal bleeding, blood in stool, constipation, diarrhea, nausea, rectal pain and vomiting.   Endocrine: Negative for cold intolerance, heat intolerance, polydipsia, polyphagia and polyuria.   Genitourinary: Negative for decreased urine volume, difficulty urinating, dysuria, enuresis, flank pain, frequency, genital sores, hematuria and urgency.   Musculoskeletal: Negative for arthralgias, back pain, gait problem, joint swelling, myalgias, neck pain and neck stiffness.   Skin: Negative for color change, pallor, rash and wound.   Allergic/Immunologic: Negative for environmental allergies, food allergies and immunocompromised state.   Neurological: Negative for dizziness, tremors, seizures, syncope, facial asymmetry, speech difficulty, weakness, light-headedness, numbness and headaches.   Hematological: Negative for adenopathy. Does not bruise/bleed easily.   Psychiatric/Behavioral: Negative for agitation, behavioral problems, confusion, decreased concentration, dysphoric mood, hallucinations, self-injury, sleep disturbance and suicidal ideas. The patient is not nervous/anxious and is not hyperactive.      Social History     Socioeconomic History   • Marital status:      Spouse name: Not on file   • Number of children: Not on file   • Years of education: Not on file   • Highest education level: Not on file   Tobacco Use   • Smoking status: Never Smoker   • Smokeless tobacco: Never Used   Substance and Sexual Activity   • Alcohol use: No   • Drug use: No   • Sexual activity: Defer     Family History   Problem Relation Age of Onset   • Heart disease Mother    • Diabetes Mother    • Breast cancer Mother    •  "Heart disease Father    • Diabetes Father    • Colon cancer Neg Hx    • Esophageal cancer Neg Hx    • Liver cancer Neg Hx    • Liver disease Neg Hx    • Stomach cancer Neg Hx      /60   Pulse 72   Ht 162.6 cm (64\")   Wt 56.7 kg (125 lb)   SpO2 99%   BMI 21.46 kg/m²   Physical Exam  Vitals signs and nursing note reviewed.   Constitutional:       Appearance: Normal appearance. She is well-developed.   HENT:      Head: Normocephalic and atraumatic.      Nose: Nose normal.   Eyes:      General: Lids are normal.      Conjunctiva/sclera: Conjunctivae normal.      Pupils: Pupils are equal, round, and reactive to light.   Neck:      Musculoskeletal: Normal range of motion and neck supple.      Thyroid: Thyromegaly present. No thyroid mass.      Vascular: No carotid bruit.      Trachea: Trachea normal. No tracheal deviation.   Cardiovascular:      Rate and Rhythm: Normal rate and regular rhythm.      Heart sounds: Normal heart sounds. No murmur. No friction rub. No gallop.    Pulmonary:      Effort: Pulmonary effort is normal. No respiratory distress.      Breath sounds: Normal breath sounds. No wheezing.   Musculoskeletal: Normal range of motion.         General: No deformity.   Lymphadenopathy:      Cervical: No cervical adenopathy.   Skin:     General: Skin is warm and dry.      Findings: No erythema or rash.      Nails: There is no clubbing.     Neurological:      Mental Status: She is alert and oriented to person, place, and time.      Cranial Nerves: No cranial nerve deficit.      Deep Tendon Reflexes: Reflexes are normal and symmetric. Reflexes normal.   Psychiatric:         Speech: Speech normal.         Behavior: Behavior normal.         Thought Content: Thought content normal.         Judgment: Judgment normal.       Results for orders placed or performed in visit on 11/03/20   T4, Free    Specimen: Blood   Result Value Ref Range    Free T4 1.35 0.93 - 1.70 ng/dL   TSH    Specimen: Blood   Result Value " Ref Range    TSH 0.305 0.270 - 4.200 uIU/mL   T3, Free    Specimen: Blood   Result Value Ref Range    T3, Free 3.47 2.00 - 4.40 pg/mL     Problems Addressed this Visit        Endocrine    Toxic multinodular goiter     Reviewed u/s and nuclear scan   Hot left nodule likely cause of intermittent thyroid excess            Hyperthyroidism - Primary     Update tfts          Relevant Orders    T4, Free    TSH      Diagnoses       Codes Comments    Hyperthyroidism    -  Primary ICD-10-CM: E05.90  ICD-9-CM: 242.90     Toxic multinodular goiter     ICD-10-CM: E05.20  ICD-9-CM: 242.20         Will need f/u tfts every 4-6 months and u/s yearly   Laura Zurita MA  Signed Marielena Tineo MD

## 2020-12-09 LAB
T4 FREE SERPL-MCNC: 1.15 NG/DL (ref 0.93–1.7)
TSH SERPL DL<=0.05 MIU/L-ACNC: 0.23 UIU/ML (ref 0.27–4.2)

## 2020-12-11 ENCOUNTER — TELEPHONE (OUTPATIENT)
Dept: ENDOCRINOLOGY | Facility: CLINIC | Age: 55
End: 2020-12-11

## 2020-12-11 DIAGNOSIS — E05.90 HYPERTHYROIDISM: Primary | ICD-10-CM

## 2021-03-23 ENCOUNTER — HOSPITAL ENCOUNTER (EMERGENCY)
Facility: HOSPITAL | Age: 56
Discharge: HOME | End: 2021-03-23
Attending: HOSPITALIST
Payer: MEDICAID

## 2021-03-23 VITALS
SYSTOLIC BLOOD PRESSURE: 122 MMHG | TEMPERATURE: 98.4 F | HEIGHT: 64 IN | DIASTOLIC BLOOD PRESSURE: 62 MMHG | BODY MASS INDEX: 21.51 KG/M2 | HEART RATE: 74 BPM | WEIGHT: 126 LBS | RESPIRATION RATE: 16 BRPM | OXYGEN SATURATION: 98 %

## 2021-03-23 DIAGNOSIS — R30.0 DYSURIA: Primary | ICD-10-CM

## 2021-03-23 LAB
BILIRUBIN URINE: NEGATIVE
BLOOD, URINE: ABNORMAL
CLARITY: CLEAR
COLOR: YELLOW
EPITHELIAL CELLS, UA: ABNORMAL /HPF (ref 0–5)
GLUCOSE URINE: NEGATIVE MG/DL
KETONES, URINE: NEGATIVE MG/DL
LEUKOCYTE ESTERASE, URINE: NEGATIVE
MICROSCOPIC EXAMINATION: YES
MUCUS: ABNORMAL /LPF
NITRITE, URINE: NEGATIVE
PH UA: 5 (ref 5–8)
PROTEIN UA: NEGATIVE MG/DL
RBC UA: ABNORMAL /HPF (ref 0–4)
SPECIFIC GRAVITY UA: 1.02 (ref 1–1.03)
URINE REFLEX TO CULTURE: ABNORMAL
URINE TYPE: ABNORMAL
UROBILINOGEN, URINE: 0.2 E.U./DL
WBC UA: ABNORMAL /HPF (ref 0–5)

## 2021-03-23 PROCEDURE — 81001 URINALYSIS AUTO W/SCOPE: CPT

## 2021-03-23 PROCEDURE — 99282 EMERGENCY DEPT VISIT SF MDM: CPT

## 2021-03-23 RX ORDER — DIAZEPAM 5 MG/1
5 TABLET ORAL EVERY 6 HOURS PRN
COMMUNITY

## 2021-03-23 RX ORDER — CETIRIZINE HYDROCHLORIDE 10 MG/1
10 TABLET ORAL DAILY
COMMUNITY

## 2021-03-23 RX ORDER — PHENAZOPYRIDINE HYDROCHLORIDE 200 MG/1
200 TABLET, FILM COATED ORAL 3 TIMES DAILY PRN
Qty: 9 TABLET | Refills: 0 | Status: SHIPPED | OUTPATIENT
Start: 2021-03-23 | End: 2021-03-26

## 2021-03-23 RX ORDER — ONDANSETRON 4 MG/1
4 TABLET, ORALLY DISINTEGRATING ORAL EVERY 8 HOURS PRN
COMMUNITY

## 2021-03-23 RX ORDER — MECLIZINE HYDROCHLORIDE 25 MG/1
25 TABLET ORAL 3 TIMES DAILY PRN
COMMUNITY

## 2021-03-23 RX ORDER — LISINOPRIL 5 MG/1
5 TABLET ORAL DAILY
COMMUNITY

## 2021-03-23 ASSESSMENT — PAIN DESCRIPTION - DESCRIPTORS: DESCRIPTORS: CRAMPING

## 2021-03-23 ASSESSMENT — PAIN SCALES - GENERAL: PAINLEVEL_OUTOF10: 8

## 2021-03-23 ASSESSMENT — PAIN DESCRIPTION - PAIN TYPE: TYPE: ACUTE PAIN;CHRONIC PAIN

## 2021-03-23 ASSESSMENT — PAIN DESCRIPTION - FREQUENCY
FREQUENCY: CONTINUOUS
FREQUENCY: CONTINUOUS

## 2021-03-23 NOTE — ED PROVIDER NOTES
[address] [facility] [address] ENCOUNTER      Pt Name: Fausto Miramontes  MRN: [id number]  YOB: 1965  Date of evaluation: 3/23/2021  Provider: Irving Hennessy DO    CHIEF COMPLAINT       Chief Complaint   Patient presents with    Abdominal Pain    Dizziness    Headache    Pharyngitis    Dysuria    Otalgia    Nausea         HISTORY OF PRESENT ILLNESS  (Location/Symptom, Timing/Onset, Context/Setting, Quality, Duration, Modifying Factors, Severity.)   Fausto Miramontes is a 63 y.o. female who presents to the emergency department for dysuria and abdominal discomfort. Patient initially was telling the nursing staff of some ongoing chronic conditions that she is treated for for years. She has chronic dizziness or vertigo which she takes meclizine and Valium for. She has chronic headaches which been ongoing for 20 some years. Patient states that she was having some mild sore throat and some ear discomfort which she was taking Augmentin for from her primary care provider. She was also having some nausea but she states the nausea is chronic also. Patient's biggest complaint now is concern for possible urinary tract infection. She states that symptoms been ongoing for about 7 days. She was evaluated 5 days ago at her primary care provider's office and they placed her on Augmentin for coverage of her UTI and her ears along with her throat. Patient's work-up was benign no for any infectious process for the throat and ears. They advised her that she had some fluid behind her ears. Patient states that she took the Augmentin because she knows that antibiotic works for her and she is afraid to try other or new antibiotics even though not the most appropriate for urine coverage. Patient states that they called her yesterday and then again today to tell her to stop taking the antibiotic because her urine culture did not grow anything as she did not have a UTI.   Patient states that she has some dysuria with urination burning sensation. She is also complaining some mild suprapubic tenderness. She denies any new nausea or vomiting but does have her chronic nausea. Denies any other really new symptoms except for the chronic things have been ongoing for some time. Patient is here specifically to be evaluated for urinary tract infection. Denies any hematuria that she is aware of. Patient states that she did take some over-the-counter Azo when her symptoms first started without any improvement. Symptoms did not improve with the use of the Augmentin either. Nursing notes were reviewed. REVIEW OFSYSTEMS    (2-9 systems for level 4, 10 or more for level 5)   ROS:  General:  No fevers, no chills, no weakness  Cardiovascular:  No chest pain, no palpitations  Respiratory:  No shortness of breath, no cough, no wheezing  Gastrointestinal:  + pain-suprapubic, no nausea, no vomiting, no diarrhea  Musculoskeletal:  No muscle pain, no joint pain  Skin:  No rash, no easy bruising  Neurologic:  No speech problems, no headache, no extremity weakness  Psychiatric:  No anxiety  Genitourinary:  + dysuria, no hematuria    Except as noted above the remainder of the review of systems was reviewed and negative. PAST MEDICAL HISTORY     Past Medical History:   Diagnosis Date    Hyperlipidemia     Hypertension     Vertigo          SURGICAL HISTORY       Past Surgical History:   Procedure Laterality Date    [facility]      [facility]normal findings    HYSTERECTOMY  2012    complete    UPPER GASTROINTESTINAL ENDOSCOPY      More than 10 years ago/[facility]         CURRENT MEDICATIONS       Previous Medications    ASPIRIN 81 MG TABLET    Take 81 mg by mouth daily    CETIRIZINE (ZYRTEC) 10 MG TABLET    Take 10 mg by mouth daily    DIAZEPAM (VALIUM) 5 MG TABLET    Take 5 mg by mouth every 6 hours as needed for Anxiety.     LISINOPRIL (PRINIVIL;ZESTRIL) 5 MG TABLET    Take 5 mg by mouth daily    MECLIZINE (ANTIVERT) 25 MG TABLET    Take 25 mg by mouth 3 times daily as needed    ONDANSETRON (ZOFRAN-ODT) 4 MG DISINTEGRATING TABLET    Take 4 mg by mouth every 8 hours as needed for Nausea or Vomiting    PRAVASTATIN (PRAVACHOL) 40 MG TABLET    Take 40 mg by mouth daily    VITAMIN D PO    Take 2 tablets by mouth nightly       ALLERGIES     Flagyl [metronidazole]    FAMILY HISTORY       Family History   Problem Relation Age of Onset    Cancer Mother     Breast Cancer Mother     High Blood Pressure Mother     Pacemaker Mother     High Blood Pressure Father     Heart Attack Father     Heart Surgery Father     Diabetes Father     No Known Problems Sister     Diabetes Brother     No Known Problems Sister           SOCIAL HISTORY       Social History     Socioeconomic History    Marital status: Unknown     Spouse name: None    Number of children: None    Years of education: None    Highest education level: None   Occupational History    None   Social Needs    Financial resource strain: None    Food insecurity     Worry: None     Inability: None    Transportation needs     Medical: None     Non-medical: None   Tobacco Use    Smoking status: Never Smoker    Smokeless tobacco: Never Used   Substance and Sexual Activity    Alcohol use: No    Drug use: No    Sexual activity: None   Lifestyle    Physical activity     Days per week: None     Minutes per session: None    Stress: None   Relationships    Social connections     Talks on phone: None     Gets together: None     Attends [employer] service: None     Active member of club or organization: None     Attends meetings of clubs or organizations: None     Relationship status: None    Intimate partner violence     Fear of current or ex partner: None     Emotionally abused: None     Physically abused: None     Forced sexual activity: None   Other Topics Concern    None   Social History Narrative    None PHYSICAL EXAM    (up to 7 for level 4, 8 or more for level 5)     ED Triage Vitals [03/23/21 1710]   BP Temp Temp Source Pulse Resp SpO2 Height Weight   130/61 98.4 °F (36.9 °C) Oral 76 16 95 % 5' 4\" (1.626 m) 126 lb (57.2 kg)       Physical Exam  General :Patient is awake, alert, oriented, in no acute distress, nontoxic appearing  HEENT: Pupils are equally round and reactive to light, EOMI, conjunctivae clear. Oral mucosa is moist, no exudate. Uvula is midline  Cardiac: Heart regular rate, rhythm, no murmurs, rubs, or gallops  Lungs: Lungs are clear to auscultation, there is no wheezing, rhonchi, or rales. There is no use of accessory muscles. Abdomen: Abdomen is soft, very mild tenderness to deep palpation of the suprapubic area, nondistended. There is no firm or pulsatile masses, no rebound rigidity or guarding. Negative Florencio sign bilaterally  Musculoskeletal: 5 out of 5 strength in all 4 extremities. No focal muscle deficits are appreciated  Neuro: Motor intact, sensory intact, level of consciousness is normal  Dermatology: Skin is warm and dry  Psych: Mentation is grossly normal, cognition is grossly normal. Affect is appropriate.       DIAGNOSTIC RESULTS     EKG: All EKG's are interpreted by the Emergency Department Physician who either signs or Co-signs this chart in the [address] a cardiologist.        RADIOLOGY:   Non-plain film images such as CT, Ultrasound and MRI are read by the radiologist. Plain radiographic images are visualized and preliminarily interpreted by the emergency physician with the below findings:      ? Radiologist's Report Reviewed:  No orders to display         ED BEDSIDE ULTRASOUND:   Performed by ED Physician - none    LABS:    I have reviewed and interpreted all of the currently available lab results from this visit (ifapplicable):  Results for orders placed or performed during the hospital encounter of 03/23/21   Urinalysis Reflex to Culture    Specimen: Urine, clean catch   Result Value Ref Range    Color, UA Yellow Straw/Yellow    Clarity, UA Clear Clear    Glucose, Ur Negative Negative mg/dL    Bilirubin Urine Negative Negative    Ketones, Urine Negative Negative mg/dL    Specific Gravity, UA 1.025 1.005 - 1.030    Blood, Urine TRACE-LYSED (A) Negative    pH, UA 5.0 5.0 - 8.0    Protein, UA Negative Negative mg/dL    Urobilinogen, Urine 0.2 <2.0 E.U./dL    Nitrite, Urine Negative Negative    Leukocyte Esterase, Urine Negative Negative    Microscopic Examination YES     Urine Type Voided     Urine Reflex to Culture Not Indicated    Microscopic Urinalysis   Result Value Ref Range    Mucus, UA 1+ (A) None Seen /LPF    WBC, UA 0-2 0 - 5 /HPF    RBC, UA 0-2 0 - 4 /HPF    Epithelial Cells, UA 0-1 0 - 5 /HPF        All other labs were within normal range or not returned as of this dictation. EMERGENCY DEPARTMENT COURSE and DIFFERENTIAL DIAGNOSIS/MDM:   Vitals:    Vitals:    03/23/21 1710   BP: 130/61   Pulse: 76   Resp: 16   Temp: 98.4 °F (36.9 °C)   TempSrc: Oral   SpO2: 95%   Weight: 126 lb (57.2 kg)   Height: 5' 4\" (1.626 m)       MEDICATIONS ADMINISTERED IN ED:  Medications - No data to display    After initial evaluation and examination I did have a conversation with the patient about the upcoming plan, treatment and possible disposition which they were agreeable to the times dictation. Patient advised that we perform a UA to see if she has urinary tract infection. Patient's final disposition will be determined once her diagnostic studies been performed reviewed. Patient does state her understanding is agreeable to this plan at this time. Resting comfortably stretcher no acute distress nontoxic-appearing. UA showed a trace lysed blood but otherwise was negative. Did not meet criteria for culture but microscopy showed +1 mucus, 0-2 white cells, 0-2 red cells and 0-1 epithelial cells.     Patient's diagnostic studies were discussed with her and advised she does not have a UTI that we would treat with antibiotics at this time. However we would provide her with a course of Pyridium to see if this helps with her dysuria. Patient advised to be 200 mg 3 times a day for 3 days. She was advised that this will change her color of her urine to reddish to orange to not be afraid it is common side effect of this medication she does state her understanding of this. Otherwise patient be discharged home in stable condition. Advised continue with her regular medications as prescribed. The patient advised that she does need to follow-up with her regular family physician within the next 1 to 2 days reevaluation. Patient was also given instructions that if her symptoms worsens or new symptoms arise she should return back to the emergency department for further evaluation work-up. CONSULTS:  None    PROCEDURES:  Procedures    CRITICAL CARE TIME    Total Critical Care time was 0 minutes, excluding separately reportable procedures. There was a high probability of clinically significant/life threatening deterioration in the patient's condition which required my urgent intervention. FINAL IMPRESSION      1. Dysuria          DISPOSITION/PLAN   DISPOSITION        PATIENT REFERRED TO:  JENNIFER Condon - COBY  [address] [facility]  [phone]    In 2 days      [facility].   [address] [facility] [address] [facility] [address],[facility] [address] [address] [phone]    As needed, If symptoms worsen    Toby Garcia MD  [address] [facility]  [phone]    Schedule an appointment as soon as possible for a visit in 1 week  If symptoms worsen or does not improve      DISCHARGE MEDICATIONS:  New Prescriptions    PHENAZOPYRIDINE (PYRIDIUM) 200 MG TABLET    Take 1 tablet by mouth 3 times daily as needed for Pain       Comment: Please note this report has been produced using speech recognition software and may contain errorsrelated to that system including errors in grammar, punctuation, and spelling, as well as words and phrases that may be inappropriate. If there are any questions or concerns please feel free to contact the dictating providerfor clarification.     Aida Mata DO  Attending Emergency Physician              Aida Mata DO  03/23/21 182

## 2021-03-23 NOTE — ED NOTES
Pt states last week she went to her doctors for a test for uti. She was advised her test was negative. She was prescribed abx, amoxicillan which she took for 5 days, until the office called and advised she does not have an infection so stop taking. She c/o dysuria for about 1 1/2 weeks, abd pain for same time and nausea. Pt states she is normally nauseas - even when she is not sick for which she take zofran. She also states she has a sore throat since last week but her doctor did not look at her throat. She was advised that she has fluid on her ears. She c/o ear pain still. She also has had headaches for 27 years and dizziness for years but is worse today.      Karthik Hernandez, TREVOR  03/23/21 [id number]

## 2021-03-23 NOTE — ED NOTES
Reviewed discharge plan with Ernie Mejias. Encouraged her to f/u with JENNIFER Singh CNP and she understood. NAD noted on discharge, gait steady. Reviewed discharge prescription for:     Current Discharge Medication List      START taking these medications    Details   phenazopyridine (PYRIDIUM) 200 MG tablet Take 1 tablet by mouth 3 times daily as needed for Pain  Qty: 9 tablet, Refills: 0             Cece Orlando states understanding of how and when to take medications.       Electronically signed by Dalila Fontenot RN on 3/23/2021 at 6:41 PM     Dalila Fontenot RN  03/23/21 6261

## 2021-03-28 ENCOUNTER — APPOINTMENT (OUTPATIENT)
Dept: CT IMAGING | Facility: HOSPITAL | Age: 56
End: 2021-03-28

## 2021-03-28 ENCOUNTER — HOSPITAL ENCOUNTER (EMERGENCY)
Facility: HOSPITAL | Age: 56
Discharge: HOME | End: 2021-03-28
Attending: EMERGENCY MEDICINE | Admitting: EMERGENCY MEDICINE

## 2021-03-28 VITALS
WEIGHT: 126 LBS | TEMPERATURE: 98.1 F | OXYGEN SATURATION: 98 % | HEIGHT: 64 IN | RESPIRATION RATE: 18 BRPM | DIASTOLIC BLOOD PRESSURE: 62 MMHG | SYSTOLIC BLOOD PRESSURE: 124 MMHG | HEART RATE: 79 BPM | BODY MASS INDEX: 21.51 KG/M2

## 2021-03-28 DIAGNOSIS — R10.30 LOWER ABDOMINAL PAIN: ICD-10-CM

## 2021-03-28 DIAGNOSIS — R10.2 PELVIC PAIN: Primary | ICD-10-CM

## 2021-03-28 LAB
ALBUMIN SERPL-MCNC: 4.1 G/DL (ref 3.5–5.2)
ALBUMIN/GLOB SERPL: 2 G/DL
ALP SERPL-CCNC: 62 U/L (ref 39–117)
ALT SERPL W P-5'-P-CCNC: 10 U/L (ref 1–33)
ANION GAP SERPL CALCULATED.3IONS-SCNC: 7.2 MMOL/L (ref 5–15)
AST SERPL-CCNC: 16 U/L (ref 1–32)
B-HCG UR QL: NEGATIVE
BASOPHILS # BLD AUTO: 0.04 10*3/MM3 (ref 0–0.2)
BASOPHILS NFR BLD AUTO: 0.6 % (ref 0–1.5)
BILIRUB SERPL-MCNC: 0.3 MG/DL (ref 0–1.2)
BILIRUB UR QL STRIP: NEGATIVE
BUN SERPL-MCNC: 15 MG/DL (ref 6–20)
BUN/CREAT SERPL: 21.4 (ref 7–25)
CALCIUM SPEC-SCNC: 9.8 MG/DL (ref 8.6–10.5)
CHLORIDE SERPL-SCNC: 104 MMOL/L (ref 98–107)
CLARITY UR: CLEAR
CO2 SERPL-SCNC: 26.8 MMOL/L (ref 22–29)
COLOR UR: YELLOW
CREAT SERPL-MCNC: 0.7 MG/DL (ref 0.57–1)
DEPRECATED RDW RBC AUTO: 40.4 FL (ref 37–54)
EOSINOPHIL # BLD AUTO: 0.15 10*3/MM3 (ref 0–0.4)
EOSINOPHIL NFR BLD AUTO: 2.2 % (ref 0.3–6.2)
ERYTHROCYTE [DISTWIDTH] IN BLOOD BY AUTOMATED COUNT: 12 % (ref 12.3–15.4)
GFR SERPL CREATININE-BSD FRML MDRD: 87 ML/MIN/1.73
GLOBULIN UR ELPH-MCNC: 2.1 GM/DL
GLUCOSE SERPL-MCNC: 84 MG/DL (ref 65–99)
GLUCOSE UR STRIP-MCNC: NEGATIVE MG/DL
HCT VFR BLD AUTO: 41.1 % (ref 34–46.6)
HGB BLD-MCNC: 13.5 G/DL (ref 12–15.9)
HGB UR QL STRIP.AUTO: NEGATIVE
IMM GRANULOCYTES # BLD AUTO: 0.02 10*3/MM3 (ref 0–0.05)
IMM GRANULOCYTES NFR BLD AUTO: 0.3 % (ref 0–0.5)
KETONES UR QL STRIP: NEGATIVE
LEUKOCYTE ESTERASE UR QL STRIP.AUTO: NEGATIVE
LIPASE SERPL-CCNC: 18 U/L (ref 13–60)
LYMPHOCYTES # BLD AUTO: 2.28 10*3/MM3 (ref 0.7–3.1)
LYMPHOCYTES NFR BLD AUTO: 33.5 % (ref 19.6–45.3)
MCH RBC QN AUTO: 30.1 PG (ref 26.6–33)
MCHC RBC AUTO-ENTMCNC: 32.8 G/DL (ref 31.5–35.7)
MCV RBC AUTO: 91.7 FL (ref 79–97)
MONOCYTES # BLD AUTO: 0.47 10*3/MM3 (ref 0.1–0.9)
MONOCYTES NFR BLD AUTO: 6.9 % (ref 5–12)
NEUTROPHILS NFR BLD AUTO: 3.84 10*3/MM3 (ref 1.7–7)
NEUTROPHILS NFR BLD AUTO: 56.5 % (ref 42.7–76)
NITRITE UR QL STRIP: NEGATIVE
NRBC BLD AUTO-RTO: 0 /100 WBC (ref 0–0.2)
PH UR STRIP.AUTO: <=5 [PH] (ref 5–8)
PLATELET # BLD AUTO: 223 10*3/MM3 (ref 140–450)
PMV BLD AUTO: 9.2 FL (ref 6–12)
POTASSIUM SERPL-SCNC: 4.4 MMOL/L (ref 3.5–5.2)
PROT SERPL-MCNC: 6.2 G/DL (ref 6–8.5)
PROT UR QL STRIP: NEGATIVE
RBC # BLD AUTO: 4.48 10*6/MM3 (ref 3.77–5.28)
SODIUM SERPL-SCNC: 138 MMOL/L (ref 136–145)
SP GR UR STRIP: 1.01 (ref 1–1.03)
UROBILINOGEN UR QL STRIP: NORMAL
WBC # BLD AUTO: 6.8 10*3/MM3 (ref 3.4–10.8)

## 2021-03-28 PROCEDURE — 74176 CT ABD & PELVIS W/O CONTRAST: CPT

## 2021-03-28 PROCEDURE — 81025 URINE PREGNANCY TEST: CPT | Performed by: PHYSICIAN ASSISTANT

## 2021-03-28 PROCEDURE — 85025 COMPLETE CBC W/AUTO DIFF WBC: CPT | Performed by: PHYSICIAN ASSISTANT

## 2021-03-28 PROCEDURE — 80053 COMPREHEN METABOLIC PANEL: CPT | Performed by: PHYSICIAN ASSISTANT

## 2021-03-28 PROCEDURE — 99283 EMERGENCY DEPT VISIT LOW MDM: CPT

## 2021-03-28 PROCEDURE — 83690 ASSAY OF LIPASE: CPT | Performed by: PHYSICIAN ASSISTANT

## 2021-03-28 PROCEDURE — 81003 URINALYSIS AUTO W/O SCOPE: CPT | Performed by: EMERGENCY MEDICINE

## 2021-03-28 RX ORDER — SENNOSIDES 8.6 MG
650 CAPSULE ORAL EVERY 8 HOURS PRN
Qty: 15 TABLET | Refills: 0 | Status: SHIPPED | OUTPATIENT
Start: 2021-03-28

## 2021-03-28 RX ORDER — SODIUM CHLORIDE 0.9 % (FLUSH) 0.9 %
10 SYRINGE (ML) INJECTION AS NEEDED
Status: DISCONTINUED | OUTPATIENT
Start: 2021-03-28 | End: 2021-03-28 | Stop reason: HOSPADM

## 2021-03-28 RX ORDER — NAPROXEN 375 MG/1
375 TABLET ORAL 2 TIMES DAILY PRN
Qty: 14 TABLET | Refills: 0 | Status: SHIPPED | OUTPATIENT
Start: 2021-03-28 | End: 2022-09-15

## 2021-05-10 ENCOUNTER — OFFICE VISIT (OUTPATIENT)
Dept: SURGERY | Facility: CLINIC | Age: 56
End: 2021-05-10

## 2021-05-10 VITALS
DIASTOLIC BLOOD PRESSURE: 74 MMHG | HEIGHT: 64 IN | TEMPERATURE: 97.1 F | BODY MASS INDEX: 21.34 KG/M2 | OXYGEN SATURATION: 98 % | SYSTOLIC BLOOD PRESSURE: 118 MMHG | HEART RATE: 71 BPM | WEIGHT: 125 LBS

## 2021-05-10 DIAGNOSIS — K59.04 CHRONIC IDIOPATHIC CONSTIPATION: ICD-10-CM

## 2021-05-10 DIAGNOSIS — K62.5 RECTAL BLEED: ICD-10-CM

## 2021-05-10 DIAGNOSIS — R39.89 BLADDER PAIN: Primary | ICD-10-CM

## 2021-05-10 PROCEDURE — 99203 OFFICE O/P NEW LOW 30 MIN: CPT | Performed by: SURGERY

## 2021-05-10 RX ORDER — ESTRADIOL 0.5 MG/1
0.5 TABLET ORAL DAILY
COMMUNITY
Start: 2021-04-05 | End: 2021-05-24

## 2021-05-10 RX ORDER — CETIRIZINE HYDROCHLORIDE 10 MG/1
10 TABLET ORAL DAILY
COMMUNITY

## 2021-05-11 RX ORDER — POLYETHYLENE GLYCOL 3350 17 G/17G
POWDER, FOR SOLUTION ORAL
Qty: 238 G | Refills: 0 | Status: SHIPPED | OUTPATIENT
Start: 2021-05-11 | End: 2021-05-24

## 2021-05-11 RX ORDER — BISACODYL 5 MG
TABLET, DELAYED RELEASE (ENTERIC COATED) ORAL
Qty: 4 TABLET | Refills: 0 | Status: SHIPPED | OUTPATIENT
Start: 2021-05-11 | End: 2022-08-15

## 2021-05-13 ENCOUNTER — PREP FOR SURGERY (OUTPATIENT)
Dept: OTHER | Facility: HOSPITAL | Age: 56
End: 2021-05-13

## 2021-05-13 DIAGNOSIS — Z12.11 COLON CANCER SCREENING: Primary | ICD-10-CM

## 2021-05-21 ENCOUNTER — ANESTHESIA EVENT (OUTPATIENT)
Dept: GASTROENTEROLOGY | Facility: HOSPITAL | Age: 56
End: 2021-05-21

## 2021-05-21 ENCOUNTER — ANESTHESIA (OUTPATIENT)
Dept: GASTROENTEROLOGY | Facility: HOSPITAL | Age: 56
End: 2021-05-21

## 2021-05-21 ENCOUNTER — HOSPITAL ENCOUNTER (OUTPATIENT)
Facility: HOSPITAL | Age: 56
Setting detail: HOSPITAL OUTPATIENT SURGERY
Discharge: HOME | End: 2021-05-21
Attending: SURGERY | Admitting: SURGERY

## 2021-05-21 VITALS
RESPIRATION RATE: 16 BRPM | WEIGHT: 125 LBS | BODY MASS INDEX: 21.34 KG/M2 | DIASTOLIC BLOOD PRESSURE: 49 MMHG | TEMPERATURE: 97.2 F | OXYGEN SATURATION: 98 % | SYSTOLIC BLOOD PRESSURE: 106 MMHG | HEART RATE: 72 BPM | HEIGHT: 64 IN

## 2021-05-21 PROCEDURE — 25010000002 PROPOFOL 10 MG/ML EMULSION: Performed by: NURSE ANESTHETIST, CERTIFIED REGISTERED

## 2021-05-21 PROCEDURE — 25010000002 ONDANSETRON PER 1 MG: Performed by: NURSE ANESTHETIST, CERTIFIED REGISTERED

## 2021-05-21 PROCEDURE — 45378 DIAGNOSTIC COLONOSCOPY: CPT | Performed by: SURGERY

## 2021-05-21 RX ORDER — PROPOFOL 10 MG/ML
VIAL (ML) INTRAVENOUS AS NEEDED
Status: DISCONTINUED | OUTPATIENT
Start: 2021-05-21 | End: 2021-05-21 | Stop reason: SURG

## 2021-05-21 RX ORDER — LIDOCAINE HYDROCHLORIDE 20 MG/ML
INJECTION, SOLUTION INTRAVENOUS AS NEEDED
Status: DISCONTINUED | OUTPATIENT
Start: 2021-05-21 | End: 2021-05-21 | Stop reason: SURG

## 2021-05-21 RX ORDER — PROPOFOL 10 MG/ML
VIAL (ML) INTRAVENOUS AS NEEDED
Status: DISCONTINUED | OUTPATIENT
Start: 2021-05-21 | End: 2021-05-21

## 2021-05-21 RX ORDER — SODIUM CHLORIDE 0.9 % (FLUSH) 0.9 %
10 SYRINGE (ML) INJECTION AS NEEDED
Status: DISCONTINUED | OUTPATIENT
Start: 2021-05-21 | End: 2021-05-21 | Stop reason: HOSPADM

## 2021-05-21 RX ORDER — ONDANSETRON 2 MG/ML
4 INJECTION INTRAMUSCULAR; INTRAVENOUS ONCE AS NEEDED
Status: DISCONTINUED | OUTPATIENT
Start: 2021-05-21 | End: 2021-05-21 | Stop reason: HOSPADM

## 2021-05-21 RX ORDER — SODIUM CHLORIDE, SODIUM LACTATE, POTASSIUM CHLORIDE, CALCIUM CHLORIDE 600; 310; 30; 20 MG/100ML; MG/100ML; MG/100ML; MG/100ML
1000 INJECTION, SOLUTION INTRAVENOUS CONTINUOUS
Status: DISCONTINUED | OUTPATIENT
Start: 2021-05-21 | End: 2021-05-21 | Stop reason: HOSPADM

## 2021-05-21 RX ORDER — ONDANSETRON 2 MG/ML
4 INJECTION INTRAMUSCULAR; INTRAVENOUS ONCE AS NEEDED
Status: COMPLETED | OUTPATIENT
Start: 2021-05-21 | End: 2021-05-21

## 2021-05-21 RX ORDER — ONDANSETRON 2 MG/ML
4 INJECTION INTRAMUSCULAR; INTRAVENOUS ONCE AS NEEDED
Status: CANCELLED | OUTPATIENT
Start: 2021-05-21 | End: 2021-05-21

## 2021-05-21 RX ADMIN — ONDANSETRON 4 MG: 2 INJECTION INTRAMUSCULAR; INTRAVENOUS at 11:35

## 2021-05-21 RX ADMIN — SODIUM CHLORIDE, POTASSIUM CHLORIDE, SODIUM LACTATE AND CALCIUM CHLORIDE 1000 ML: 600; 310; 30; 20 INJECTION, SOLUTION INTRAVENOUS at 11:31

## 2021-05-21 RX ADMIN — PROPOFOL 350 MG: 10 INJECTION, EMULSION INTRAVENOUS at 11:50

## 2021-05-21 RX ADMIN — LIDOCAINE HYDROCHLORIDE 100 MG: 20 INJECTION, SOLUTION INTRAVENOUS at 11:40

## 2021-05-21 NOTE — ANESTHESIA PREPROCEDURE EVALUATION
Anesthesia Evaluation     Patient summary reviewed and Nursing notes reviewed   history of anesthetic complications: PONV  NPO Solid Status: > 8 hours  NPO Liquid Status: > 8 hours           Airway   Mallampati: II  TM distance: >3 FB  Neck ROM: full  No difficulty expected  Dental - normal exam     Pulmonary - negative pulmonary ROS and normal exam   (-) not a smoker  Cardiovascular - normal exam    (+) hypertension well controlled 2 medications or greater, hyperlipidemia,       Neuro/Psych  (+) headaches,     GI/Hepatic/Renal/Endo    (+)   thyroid problem hyperthyroidism    Musculoskeletal     (+) back pain,   Abdominal    Substance History - negative use     OB/GYN          Other - negative ROS                       Anesthesia Plan    ASA 2     MAC   (Risks and benefits discussed including risk of aspiration, recall and dental damage. All patient questions answered. Will continue with POC.)  intravenous induction     Anesthetic plan, all risks, benefits, and alternatives have been provided, discussed and informed consent has been obtained with: patient.    Plan discussed with CRNA.

## 2021-05-24 ENCOUNTER — OFFICE VISIT (OUTPATIENT)
Dept: UROLOGY | Facility: CLINIC | Age: 56
End: 2021-05-24

## 2021-05-24 VITALS
RESPIRATION RATE: 18 BRPM | TEMPERATURE: 97.5 F | SYSTOLIC BLOOD PRESSURE: 110 MMHG | OXYGEN SATURATION: 98 % | BODY MASS INDEX: 21.34 KG/M2 | HEIGHT: 64 IN | DIASTOLIC BLOOD PRESSURE: 68 MMHG | HEART RATE: 70 BPM | WEIGHT: 125 LBS

## 2021-05-24 DIAGNOSIS — R30.0 DYSURIA: Primary | ICD-10-CM

## 2021-05-24 LAB
BILIRUB BLD-MCNC: NEGATIVE MG/DL
CLARITY, POC: CLEAR
COLOR UR: YELLOW
GLUCOSE UR STRIP-MCNC: NEGATIVE MG/DL
KETONES UR QL: NEGATIVE
LEUKOCYTE EST, POC: NEGATIVE
NITRITE UR-MCNC: NEGATIVE MG/ML
PH UR: 6 [PH] (ref 5–8)
PROT UR STRIP-MCNC: NEGATIVE MG/DL
RBC # UR STRIP: ABNORMAL /UL
SP GR UR: 1.03 (ref 1–1.03)
UROBILINOGEN UR QL: NORMAL

## 2021-05-24 PROCEDURE — 51798 US URINE CAPACITY MEASURE: CPT | Performed by: UROLOGY

## 2021-05-24 PROCEDURE — 99204 OFFICE O/P NEW MOD 45 MIN: CPT | Performed by: UROLOGY

## 2021-05-24 RX ORDER — ESTRADIOL 0.1 MG/G
2 CREAM VAGINAL 3 TIMES WEEKLY
Qty: 42.5 G | Refills: 12 | Status: SHIPPED | OUTPATIENT
Start: 2021-05-24 | End: 2022-09-15

## 2021-05-24 RX ORDER — POLYETHYLENE GLYCOL 3350 17 G/17G
17 POWDER, FOR SOLUTION ORAL DAILY
Qty: 30 PACKET | Refills: 11 | Status: ON HOLD | OUTPATIENT
Start: 2021-05-24 | End: 2021-06-19

## 2021-05-24 NOTE — PROGRESS NOTES
Chief Complaint  Chief Complaint   Patient presents with   • Difficulty Urinating       Referring Provider:  Johana Galicia, GURMEETC    HPI  Ms. Vides is a 55 y.o. female with below past medical history who presents with pelvic pain. The patient notes that approximately 1.5 months ago she went to see a urologist. She was still in pain so she went to see a gynecologists. She was prescribed hormone pills. She has had this evaluated multiple times in the past for urinary tract infections. She had a colonoscopy on 05/21/2021. The patient notes that she does not have a bowel movement every day, but she does have some constipation. She notes that her stool is hard at times. She has been prescribed polyethylene glycol Miralax in the past by Dr. Carrasquillo as a prep. She is not currently on an estrogen cream, she has only been prescribed the pill. She notes that she did not feel any better when she was started on the Estrace tablet. The patient does have rawness and irritation with intercourse but notes that intercourse is not an option due to the pain.     She denies any dysuria, urinary leakage, coughing, sneezing, or leakage. She does not drink much water.    Past Medical History  Past Medical History:   Diagnosis Date   • Back pain    • Cataract 05/20/2021   • Elevated cholesterol    • Frequent headaches    • Hyperlipidemia    • Hypertension    • PONV (postoperative nausea and vomiting)        Past Surgical History  Past Surgical History:   Procedure Laterality Date   • CATARACT EXTRACTION WITH INTRAOCULAR LENS IMPLANT Left 05/20/2021   • CHOLECYSTECTOMY     • COLONOSCOPY     • COLONOSCOPY N/A 5/21/2021    Procedure: COLONOSCOPY;  Surgeon: Lynn Durham MD;  Location: [facility] ENDOSCOPY;  Service: Gastroenterology;  Laterality: N/A;   • EYE SURGERY     • HYSTERECTOMY  2012       Medications    Current Outpatient Medications:   •  acetaminophen (Tylenol 8 Hour) 650 MG 8 hr tablet, Take 1 tablet by mouth Every 8 (Eight)  Hours As Needed for Mild Pain ., Disp: 15 tablet, Rfl: 0  •  aspirin 81 MG EC tablet, Take 81 mg by mouth Daily. On hold this week., Disp: , Rfl:   •  bisacodyl (Dulcolax) 5 MG EC tablet, Take 2 at 3pm and 2 at 7pm day prior to colonoscopy., Disp: 4 tablet, Rfl: 0  •  cetirizine (zyrTEC) 10 MG tablet, Take 10 mg by mouth Daily., Disp: , Rfl:   •  Cholecalciferol (VITAMIN D) 2000 UNITS tablet, Take 2,000 Units by mouth Daily., Disp: , Rfl:   •  diazePAM (VALIUM) 5 MG tablet, Take 5 mg by mouth Daily As Needed., Disp: , Rfl:   •  estradiol (ESTRACE) 0.5 MG tablet, Take 0.5 mg by mouth Daily., Disp: , Rfl:   •  lisinopril (PRINIVIL,ZESTRIL) 10 MG tablet, Take 10 mg by mouth Daily., Disp: , Rfl:   •  meclizine (ANTIVERT) 25 MG tablet, Take 1 tablet by mouth 4 (Four) Times a Day As Needed for dizziness., Disp: 30 tablet, Rfl: 0  •  naproxen (NAPROSYN) 375 MG tablet, Take 1 tablet by mouth 2 (Two) Times a Day As Needed for Mild Pain ., Disp: 14 tablet, Rfl: 0  •  polyethylene glycol (GlycoLax) 17 GM/SCOOP powder, Mix 238 g with 64  Oz clear liquid. Starting at 5 PM drinking 8 oz every 10 to 15 min until consumed, Disp: 238 g, Rfl: 0  •  pravastatin (PRAVACHOL) 40 MG tablet, Take 40 mg by mouth daily., Disp: , Rfl:   •  VITAMIN D PO, Take 2 tablets by mouth Daily., Disp: , Rfl:     Current Facility-Administered Medications:   •  meclizine (ANTIVERT) tablet 25 mg, 25 mg, Oral, TID PRN, Lucila Slaughter, RAVI    Allergies  Allergies   Allergen Reactions   • Metoprolol Delirium   • Flagyl [Metronidazole] Nausea And Vomiting       Social History  Social History     Socioeconomic History   • Marital status:      Spouse name: Not on file   • Number of children: Not on file   • Years of education: Not on file   • Highest education level: Not on file   Tobacco Use   • Smoking status: Never Smoker   • Smokeless tobacco: Never Used   Vaping Use   • Vaping Use: Never used   Substance and Sexual Activity   • Alcohol use: No   •  "Drug use: No   • Sexual activity: Defer       Family History  She has no family history of .    Review of Systems  Review of systems was notable for no IBS or fibromyalgia.    Physical Exam  Visit Vitals  /68   Pulse 70   Temp 97.5 °F (36.4 °C)   Resp 18   Ht 162.6 cm (64.02\")   Wt 56.7 kg (125 lb)   SpO2 98%   BMI 21.45 kg/m²     Physical exam was notable for thin.    Labs  Brief Urine Lab Results  (Last result in the past 365 days)      Color   Clarity   Blood   Leuk Est   Nitrite   Protein   CREAT   Urine HCG        05/24/21 0912 Yellow Clear Trace Negative Negative Negative                    Lab Results   Component Value Date    GLUCOSE 84 03/28/2021    CALCIUM 9.8 03/28/2021     03/28/2021    K 4.4 03/28/2021    CO2 26.8 03/28/2021     03/28/2021    BUN 15 03/28/2021    CREATININE 0.70 03/28/2021    EGFRIFNONA 87 03/28/2021    BCR 21.4 03/28/2021    ANIONGAP 7.2 03/28/2021       Lab Results   Component Value Date    WBC 6.80 03/28/2021    HGB 13.5 03/28/2021    HCT 41.1 03/28/2021    MCV 91.7 03/28/2021     03/28/2021         Radiographic Studies  CT Abdomen Pelvis Without Contrast  Result Date: 3/28/2021  No acute findings in the abdomen or pelvis.  This report was finalized on 3/28/2021 2:04 PM by Dr Max Hadley DO.      PVR  Post-void residual performed with ultrasound scanner by staff and interpreted by me -       Assessment  Ms. Vides is a 55 y.o. female with post-menopausal female who complains of acute worsening of chronic pelvic pain in this area. She is constipated, has poor PO water intake. She has been to see a gynecologist and is on systemic estrogen which does not seem to be helping her. She has had a colonoscopy recently.    Plan  1. She will start Miralax daily.  2. Increase water intake.  3. The patient will start estrogen vaginal cream and stop the pills. I have sent this prescription to her pharmacy.   4. Follow up in 3 months for cystoscopy and pelvic exam. "     Scribed for Tong Sevilla MD by Jay Vaughn.  05/24/21   11:01 EDT    I have personally performed the services described in this document as scribed by the above individual, and it is both accurate and complete.  Tong Sevilla MD  5/31/2021  21:41 EDT          Tong Sevilla MD

## 2021-06-18 ENCOUNTER — HOSPITAL ENCOUNTER (INPATIENT)
Facility: HOSPITAL | Age: 56
LOS: 3 days | Discharge: HOME | End: 2021-06-21
Attending: EMERGENCY MEDICINE | Admitting: INTERNAL MEDICINE

## 2021-06-18 ENCOUNTER — APPOINTMENT (OUTPATIENT)
Dept: GENERAL RADIOLOGY | Facility: HOSPITAL | Age: 56
End: 2021-06-18

## 2021-06-18 DIAGNOSIS — I21.4 NSTEMI (NON-ST ELEVATED MYOCARDIAL INFARCTION) (HCC): Primary | ICD-10-CM

## 2021-06-18 PROBLEM — R79.89 LOW TSH LEVEL: Status: RESOLVED | Noted: 2020-08-26 | Resolved: 2021-06-18

## 2021-06-18 PROBLEM — Z12.11 COLON CANCER SCREENING: Status: RESOLVED | Noted: 2021-05-13 | Resolved: 2021-06-18

## 2021-06-18 LAB
ALBUMIN SERPL-MCNC: 4.4 G/DL (ref 3.5–5.2)
ALBUMIN/GLOB SERPL: 1.6 G/DL
ALP SERPL-CCNC: 67 U/L (ref 39–117)
ALT SERPL W P-5'-P-CCNC: 10 U/L (ref 1–33)
ANION GAP SERPL CALCULATED.3IONS-SCNC: 11.5 MMOL/L (ref 5–15)
APTT PPP: 25.2 SECONDS (ref 70–100)
AST SERPL-CCNC: 19 U/L (ref 1–32)
BASOPHILS # BLD AUTO: 0.07 10*3/MM3 (ref 0–0.2)
BASOPHILS NFR BLD AUTO: 0.6 % (ref 0–1.5)
BILIRUB SERPL-MCNC: 0.2 MG/DL (ref 0–1.2)
BUN SERPL-MCNC: 14 MG/DL (ref 6–20)
BUN/CREAT SERPL: 19.2 (ref 7–25)
CALCIUM SPEC-SCNC: 9.7 MG/DL (ref 8.6–10.5)
CHLORIDE SERPL-SCNC: 106 MMOL/L (ref 98–107)
CO2 SERPL-SCNC: 22.5 MMOL/L (ref 22–29)
CREAT SERPL-MCNC: 0.73 MG/DL (ref 0.57–1)
DEPRECATED RDW RBC AUTO: 41 FL (ref 37–54)
EOSINOPHIL # BLD AUTO: 0.1 10*3/MM3 (ref 0–0.4)
EOSINOPHIL NFR BLD AUTO: 0.9 % (ref 0.3–6.2)
ERYTHROCYTE [DISTWIDTH] IN BLOOD BY AUTOMATED COUNT: 12.1 % (ref 12.3–15.4)
GFR SERPL CREATININE-BSD FRML MDRD: 82 ML/MIN/1.73
GLOBULIN UR ELPH-MCNC: 2.8 GM/DL
GLUCOSE SERPL-MCNC: 115 MG/DL (ref 65–99)
HCT VFR BLD AUTO: 43.1 % (ref 34–46.6)
HGB BLD-MCNC: 14.4 G/DL (ref 12–15.9)
HOLD SPECIMEN: NORMAL
HOLD SPECIMEN: NORMAL
IMM GRANULOCYTES # BLD AUTO: 0.04 10*3/MM3 (ref 0–0.05)
IMM GRANULOCYTES NFR BLD AUTO: 0.4 % (ref 0–0.5)
INR PPP: 0.88 (ref 0.9–1.1)
LYMPHOCYTES # BLD AUTO: 2.17 10*3/MM3 (ref 0.7–3.1)
LYMPHOCYTES NFR BLD AUTO: 19.1 % (ref 19.6–45.3)
MCH RBC QN AUTO: 30.6 PG (ref 26.6–33)
MCHC RBC AUTO-ENTMCNC: 33.4 G/DL (ref 31.5–35.7)
MCV RBC AUTO: 91.7 FL (ref 79–97)
MONOCYTES # BLD AUTO: 0.65 10*3/MM3 (ref 0.1–0.9)
MONOCYTES NFR BLD AUTO: 5.7 % (ref 5–12)
NEUTROPHILS NFR BLD AUTO: 73.3 % (ref 42.7–76)
NEUTROPHILS NFR BLD AUTO: 8.34 10*3/MM3 (ref 1.7–7)
NRBC BLD AUTO-RTO: 0 /100 WBC (ref 0–0.2)
PLATELET # BLD AUTO: 232 10*3/MM3 (ref 140–450)
PMV BLD AUTO: 9.2 FL (ref 6–12)
POTASSIUM SERPL-SCNC: 3.9 MMOL/L (ref 3.5–5.2)
PROT SERPL-MCNC: 7.2 G/DL (ref 6–8.5)
PROTHROMBIN TIME: 12.4 SECONDS (ref 12–15.1)
RBC # BLD AUTO: 4.7 10*6/MM3 (ref 3.77–5.28)
SODIUM SERPL-SCNC: 140 MMOL/L (ref 136–145)
TROPONIN T SERPL-MCNC: 0.42 NG/ML (ref 0–0.03)
TROPONIN T SERPL-MCNC: 1.68 NG/ML (ref 0–0.03)
WBC # BLD AUTO: 11.37 10*3/MM3 (ref 3.4–10.8)
WHOLE BLOOD HOLD SPECIMEN: NORMAL

## 2021-06-18 PROCEDURE — 84484 ASSAY OF TROPONIN QUANT: CPT | Performed by: PHYSICIAN ASSISTANT

## 2021-06-18 PROCEDURE — 99284 EMERGENCY DEPT VISIT MOD MDM: CPT

## 2021-06-18 PROCEDURE — 83036 HEMOGLOBIN GLYCOSYLATED A1C: CPT | Performed by: INTERNAL MEDICINE

## 2021-06-18 PROCEDURE — 80053 COMPREHEN METABOLIC PANEL: CPT | Performed by: EMERGENCY MEDICINE

## 2021-06-18 PROCEDURE — 25010000002 HEPARIN (PORCINE) PER 1000 UNITS: Performed by: PHYSICIAN ASSISTANT

## 2021-06-18 PROCEDURE — 93005 ELECTROCARDIOGRAM TRACING: CPT | Performed by: EMERGENCY MEDICINE

## 2021-06-18 PROCEDURE — 99254 IP/OBS CNSLTJ NEW/EST MOD 60: CPT | Performed by: INTERNAL MEDICINE

## 2021-06-18 PROCEDURE — 99223 1ST HOSP IP/OBS HIGH 75: CPT | Performed by: INTERNAL MEDICINE

## 2021-06-18 PROCEDURE — 84484 ASSAY OF TROPONIN QUANT: CPT | Performed by: EMERGENCY MEDICINE

## 2021-06-18 PROCEDURE — 85730 THROMBOPLASTIN TIME PARTIAL: CPT | Performed by: PHYSICIAN ASSISTANT

## 2021-06-18 PROCEDURE — 93005 ELECTROCARDIOGRAM TRACING: CPT | Performed by: PHYSICIAN ASSISTANT

## 2021-06-18 PROCEDURE — 85025 COMPLETE CBC W/AUTO DIFF WBC: CPT | Performed by: EMERGENCY MEDICINE

## 2021-06-18 PROCEDURE — 71045 X-RAY EXAM CHEST 1 VIEW: CPT

## 2021-06-18 PROCEDURE — 25010000002 MORPHINE PER 10 MG: Performed by: EMERGENCY MEDICINE

## 2021-06-18 PROCEDURE — 85610 PROTHROMBIN TIME: CPT | Performed by: PHYSICIAN ASSISTANT

## 2021-06-18 PROCEDURE — 25010000002 ONDANSETRON PER 1 MG: Performed by: PHYSICIAN ASSISTANT

## 2021-06-18 RX ORDER — ONDANSETRON 2 MG/ML
4 INJECTION INTRAMUSCULAR; INTRAVENOUS ONCE
Status: COMPLETED | OUTPATIENT
Start: 2021-06-18 | End: 2021-06-18

## 2021-06-18 RX ORDER — HEPARIN SODIUM 1000 [USP'U]/ML
60 INJECTION, SOLUTION INTRAVENOUS; SUBCUTANEOUS ONCE
Status: COMPLETED | OUTPATIENT
Start: 2021-06-18 | End: 2021-06-18

## 2021-06-18 RX ORDER — MORPHINE SULFATE 4 MG/ML
4 INJECTION, SOLUTION INTRAMUSCULAR; INTRAVENOUS ONCE
Status: COMPLETED | OUTPATIENT
Start: 2021-06-18 | End: 2021-06-18

## 2021-06-18 RX ORDER — HEPARIN SODIUM 10000 [USP'U]/100ML
12 INJECTION, SOLUTION INTRAVENOUS
Status: DISCONTINUED | OUTPATIENT
Start: 2021-06-18 | End: 2021-06-19

## 2021-06-18 RX ORDER — NITROGLYCERIN 0.4 MG/1
0.4 TABLET SUBLINGUAL
Status: DISCONTINUED | OUTPATIENT
Start: 2021-06-18 | End: 2021-06-20

## 2021-06-18 RX ORDER — HEPARIN SODIUM 1000 [USP'U]/ML
30 INJECTION INTRAVENOUS; SUBCUTANEOUS AS NEEDED
Status: DISCONTINUED | OUTPATIENT
Start: 2021-06-18 | End: 2021-06-19

## 2021-06-18 RX ORDER — SODIUM CHLORIDE 0.9 % (FLUSH) 0.9 %
10 SYRINGE (ML) INJECTION AS NEEDED
Status: DISCONTINUED | OUTPATIENT
Start: 2021-06-18 | End: 2021-06-21 | Stop reason: HOSPADM

## 2021-06-18 RX ORDER — ASPIRIN 81 MG/1
324 TABLET, CHEWABLE ORAL ONCE
Status: COMPLETED | OUTPATIENT
Start: 2021-06-18 | End: 2021-06-18

## 2021-06-18 RX ORDER — HEPARIN SODIUM 1000 [USP'U]/ML
60 INJECTION INTRAVENOUS; SUBCUTANEOUS AS NEEDED
Status: DISCONTINUED | OUTPATIENT
Start: 2021-06-18 | End: 2021-06-19

## 2021-06-18 RX ADMIN — HEPARIN SODIUM AND DEXTROSE 680 UNITS/HR: 10000; 5 INJECTION INTRAVENOUS at 22:00

## 2021-06-18 RX ADMIN — NITROGLYCERIN 0.4 MG: 0.4 TABLET SUBLINGUAL at 21:52

## 2021-06-18 RX ADMIN — HEPARIN SODIUM 3400 UNITS: 1000 INJECTION INTRAVENOUS; SUBCUTANEOUS at 22:00

## 2021-06-18 RX ADMIN — NITROGLYCERIN 0.4 MG: 0.4 TABLET SUBLINGUAL at 21:57

## 2021-06-18 RX ADMIN — ONDANSETRON 4 MG: 2 INJECTION INTRAMUSCULAR; INTRAVENOUS at 21:24

## 2021-06-18 RX ADMIN — MORPHINE SULFATE 4 MG: 4 INJECTION, SOLUTION INTRAMUSCULAR; INTRAVENOUS at 21:15

## 2021-06-18 RX ADMIN — ASPIRIN 81 MG CHEWABLE TABLET 324 MG: 81 TABLET CHEWABLE at 20:39

## 2021-06-18 RX ADMIN — ONDANSETRON 4 MG: 2 INJECTION INTRAMUSCULAR; INTRAVENOUS at 21:53

## 2021-06-19 ENCOUNTER — APPOINTMENT (OUTPATIENT)
Dept: GENERAL RADIOLOGY | Facility: HOSPITAL | Age: 56
End: 2021-06-19

## 2021-06-19 PROBLEM — I51.81 STRESS-INDUCED CARDIOMYOPATHY: Status: ACTIVE | Noted: 2021-06-18

## 2021-06-19 LAB
ALBUMIN SERPL-MCNC: 3.9 G/DL (ref 3.5–5.2)
ALBUMIN/GLOB SERPL: 1.8 G/DL
ALP SERPL-CCNC: 61 U/L (ref 39–117)
ALT SERPL W P-5'-P-CCNC: 22 U/L (ref 1–33)
ANION GAP SERPL CALCULATED.3IONS-SCNC: 10.8 MMOL/L (ref 5–15)
AST SERPL-CCNC: 66 U/L (ref 1–32)
BILIRUB SERPL-MCNC: 0.4 MG/DL (ref 0–1.2)
BUN SERPL-MCNC: 14 MG/DL (ref 6–20)
BUN/CREAT SERPL: 20.6 (ref 7–25)
CALCIUM SPEC-SCNC: 9.8 MG/DL (ref 8.6–10.5)
CATH EF ESTIMATED: 30 %
CHLORIDE SERPL-SCNC: 108 MMOL/L (ref 98–107)
CO2 SERPL-SCNC: 23.2 MMOL/L (ref 22–29)
CREAT SERPL-MCNC: 0.68 MG/DL (ref 0.57–1)
GFR SERPL CREATININE-BSD FRML MDRD: 90 ML/MIN/1.73
GLOBULIN UR ELPH-MCNC: 2.2 GM/DL
GLUCOSE SERPL-MCNC: 134 MG/DL (ref 65–99)
HBA1C MFR BLD: 4.9 % (ref 4.8–5.6)
POTASSIUM SERPL-SCNC: 4.5 MMOL/L (ref 3.5–5.2)
PROT SERPL-MCNC: 6.1 G/DL (ref 6–8.5)
SARS-COV-2 RNA PNL SPEC NAA+PROBE: NOT DETECTED
SODIUM SERPL-SCNC: 142 MMOL/L (ref 136–145)
TSH SERPL DL<=0.05 MIU/L-ACNC: 0.18 UIU/ML (ref 0.27–4.2)

## 2021-06-19 PROCEDURE — 25010000003 LIDOCAINE 1 % SOLUTION: Performed by: INTERNAL MEDICINE

## 2021-06-19 PROCEDURE — 87635 SARS-COV-2 COVID-19 AMP PRB: CPT | Performed by: INTERNAL MEDICINE

## 2021-06-19 PROCEDURE — 25010000002 FENTANYL CITRATE (PF) 50 MCG/ML SOLUTION: Performed by: INTERNAL MEDICINE

## 2021-06-19 PROCEDURE — 25010000002 MIDAZOLAM PER 1MG: Performed by: INTERNAL MEDICINE

## 2021-06-19 PROCEDURE — 4A023N7 MEASUREMENT OF CARDIAC SAMPLING AND PRESSURE, LEFT HEART, PERCUTANEOUS APPROACH: ICD-10-PCS | Performed by: INTERNAL MEDICINE

## 2021-06-19 PROCEDURE — 25010000002 HEPARIN (PORCINE) PER 1000 UNITS: Performed by: INTERNAL MEDICINE

## 2021-06-19 PROCEDURE — 93458 L HRT ARTERY/VENTRICLE ANGIO: CPT | Performed by: INTERNAL MEDICINE

## 2021-06-19 PROCEDURE — 93005 ELECTROCARDIOGRAM TRACING: CPT | Performed by: INTERNAL MEDICINE

## 2021-06-19 PROCEDURE — B2111ZZ FLUOROSCOPY OF MULTIPLE CORONARY ARTERIES USING LOW OSMOLAR CONTRAST: ICD-10-PCS | Performed by: INTERNAL MEDICINE

## 2021-06-19 PROCEDURE — 25010000002 ONDANSETRON PER 1 MG: Performed by: INTERNAL MEDICINE

## 2021-06-19 PROCEDURE — 25010000002 PROMETHAZINE PER 50 MG: Performed by: INTERNAL MEDICINE

## 2021-06-19 PROCEDURE — 99024 POSTOP FOLLOW-UP VISIT: CPT | Performed by: INTERNAL MEDICINE

## 2021-06-19 PROCEDURE — 71045 X-RAY EXAM CHEST 1 VIEW: CPT

## 2021-06-19 PROCEDURE — C1894 INTRO/SHEATH, NON-LASER: HCPCS | Performed by: INTERNAL MEDICINE

## 2021-06-19 PROCEDURE — 84443 ASSAY THYROID STIM HORMONE: CPT | Performed by: INTERNAL MEDICINE

## 2021-06-19 PROCEDURE — B2151ZZ FLUOROSCOPY OF LEFT HEART USING LOW OSMOLAR CONTRAST: ICD-10-PCS | Performed by: INTERNAL MEDICINE

## 2021-06-19 PROCEDURE — C1769 GUIDE WIRE: HCPCS | Performed by: INTERNAL MEDICINE

## 2021-06-19 PROCEDURE — 99232 SBSQ HOSP IP/OBS MODERATE 35: CPT | Performed by: INTERNAL MEDICINE

## 2021-06-19 PROCEDURE — 80053 COMPREHEN METABOLIC PANEL: CPT | Performed by: INTERNAL MEDICINE

## 2021-06-19 PROCEDURE — 0 IOPAMIDOL PER 1 ML: Performed by: INTERNAL MEDICINE

## 2021-06-19 RX ORDER — ONDANSETRON 4 MG/1
4 TABLET, FILM COATED ORAL EVERY 6 HOURS PRN
Status: DISCONTINUED | OUTPATIENT
Start: 2021-06-19 | End: 2021-06-21 | Stop reason: HOSPADM

## 2021-06-19 RX ORDER — ALUMINA, MAGNESIA, AND SIMETHICONE 2400; 2400; 240 MG/30ML; MG/30ML; MG/30ML
15 SUSPENSION ORAL EVERY 6 HOURS PRN
Status: DISCONTINUED | OUTPATIENT
Start: 2021-06-19 | End: 2021-06-21 | Stop reason: HOSPADM

## 2021-06-19 RX ORDER — POLYETHYLENE GLYCOL 3350 17 G/17G
17 POWDER, FOR SOLUTION ORAL DAILY
Status: DISCONTINUED | OUTPATIENT
Start: 2021-06-19 | End: 2021-06-21 | Stop reason: HOSPADM

## 2021-06-19 RX ORDER — ALPRAZOLAM 0.5 MG/1
0.5 TABLET ORAL NIGHTLY PRN
Status: DISCONTINUED | OUTPATIENT
Start: 2021-06-19 | End: 2021-06-21 | Stop reason: HOSPADM

## 2021-06-19 RX ORDER — NALOXONE HCL 0.4 MG/ML
0.4 VIAL (ML) INJECTION
Status: DISCONTINUED | OUTPATIENT
Start: 2021-06-19 | End: 2021-06-21 | Stop reason: HOSPADM

## 2021-06-19 RX ORDER — MORPHINE SULFATE 2 MG/ML
2 INJECTION, SOLUTION INTRAMUSCULAR; INTRAVENOUS
Status: DISCONTINUED | OUTPATIENT
Start: 2021-06-19 | End: 2021-06-19

## 2021-06-19 RX ORDER — ONDANSETRON 2 MG/ML
4 INJECTION INTRAMUSCULAR; INTRAVENOUS ONCE
Status: COMPLETED | OUTPATIENT
Start: 2021-06-19 | End: 2021-06-19

## 2021-06-19 RX ORDER — METOPROLOL SUCCINATE 25 MG/1
25 TABLET, EXTENDED RELEASE ORAL DAILY
Qty: 90 TABLET | Refills: 1 | Status: CANCELLED | OUTPATIENT
Start: 2021-06-19

## 2021-06-19 RX ORDER — METOPROLOL SUCCINATE 25 MG/1
25 TABLET, EXTENDED RELEASE ORAL
Status: DISCONTINUED | OUTPATIENT
Start: 2021-06-19 | End: 2021-06-21

## 2021-06-19 RX ORDER — DIAZEPAM 5 MG/1
5 TABLET ORAL DAILY PRN
Status: DISCONTINUED | OUTPATIENT
Start: 2021-06-19 | End: 2021-06-21 | Stop reason: HOSPADM

## 2021-06-19 RX ORDER — CHOLECALCIFEROL (VITAMIN D3) 125 MCG
5 CAPSULE ORAL NIGHTLY PRN
Status: DISCONTINUED | OUTPATIENT
Start: 2021-06-19 | End: 2021-06-21 | Stop reason: HOSPADM

## 2021-06-19 RX ORDER — ACETAMINOPHEN 325 MG/1
650 TABLET ORAL EVERY 4 HOURS PRN
Status: DISCONTINUED | OUTPATIENT
Start: 2021-06-19 | End: 2021-06-21 | Stop reason: HOSPADM

## 2021-06-19 RX ORDER — SODIUM CHLORIDE 0.9 % (FLUSH) 0.9 %
10 SYRINGE (ML) INJECTION AS NEEDED
Status: DISCONTINUED | OUTPATIENT
Start: 2021-06-19 | End: 2021-06-21 | Stop reason: HOSPADM

## 2021-06-19 RX ORDER — MIDAZOLAM HYDROCHLORIDE 2 MG/2ML
INJECTION, SOLUTION INTRAMUSCULAR; INTRAVENOUS AS NEEDED
Status: DISCONTINUED | OUTPATIENT
Start: 2021-06-19 | End: 2021-06-19 | Stop reason: HOSPADM

## 2021-06-19 RX ORDER — CETIRIZINE HYDROCHLORIDE 10 MG/1
10 TABLET ORAL DAILY
Status: DISCONTINUED | OUTPATIENT
Start: 2021-06-19 | End: 2021-06-21 | Stop reason: HOSPADM

## 2021-06-19 RX ORDER — ASPIRIN 81 MG/1
81 TABLET ORAL DAILY
Status: DISCONTINUED | OUTPATIENT
Start: 2021-06-19 | End: 2021-06-21 | Stop reason: HOSPADM

## 2021-06-19 RX ORDER — MELATONIN
2000 DAILY
Status: DISCONTINUED | OUTPATIENT
Start: 2021-06-19 | End: 2021-06-21 | Stop reason: HOSPADM

## 2021-06-19 RX ORDER — MECLIZINE HYDROCHLORIDE 25 MG/1
25 TABLET ORAL 4 TIMES DAILY PRN
Status: DISCONTINUED | OUTPATIENT
Start: 2021-06-19 | End: 2021-06-21 | Stop reason: HOSPADM

## 2021-06-19 RX ORDER — SODIUM CHLORIDE 9 MG/ML
75 INJECTION, SOLUTION INTRAVENOUS CONTINUOUS
Status: DISCONTINUED | OUTPATIENT
Start: 2021-06-19 | End: 2021-06-19

## 2021-06-19 RX ORDER — SODIUM CHLORIDE 0.9 % (FLUSH) 0.9 %
10 SYRINGE (ML) INJECTION EVERY 12 HOURS SCHEDULED
Status: DISCONTINUED | OUTPATIENT
Start: 2021-06-19 | End: 2021-06-21 | Stop reason: HOSPADM

## 2021-06-19 RX ORDER — PRAVASTATIN SODIUM 20 MG
40 TABLET ORAL DAILY
Status: DISCONTINUED | OUTPATIENT
Start: 2021-06-19 | End: 2021-06-21 | Stop reason: HOSPADM

## 2021-06-19 RX ORDER — LIDOCAINE HYDROCHLORIDE 10 MG/ML
INJECTION, SOLUTION INFILTRATION; PERINEURAL AS NEEDED
Status: DISCONTINUED | OUTPATIENT
Start: 2021-06-19 | End: 2021-06-19 | Stop reason: HOSPADM

## 2021-06-19 RX ORDER — FENTANYL CITRATE 50 UG/ML
INJECTION, SOLUTION INTRAMUSCULAR; INTRAVENOUS AS NEEDED
Status: DISCONTINUED | OUTPATIENT
Start: 2021-06-19 | End: 2021-06-19 | Stop reason: HOSPADM

## 2021-06-19 RX ORDER — HYDROCODONE BITARTRATE AND ACETAMINOPHEN 7.5; 325 MG/1; MG/1
1 TABLET ORAL EVERY 6 HOURS PRN
Status: DISCONTINUED | OUTPATIENT
Start: 2021-06-19 | End: 2021-06-21 | Stop reason: HOSPADM

## 2021-06-19 RX ORDER — ONDANSETRON 2 MG/ML
4 INJECTION INTRAMUSCULAR; INTRAVENOUS EVERY 6 HOURS PRN
Status: DISCONTINUED | OUTPATIENT
Start: 2021-06-19 | End: 2021-06-21 | Stop reason: HOSPADM

## 2021-06-19 RX ORDER — LISINOPRIL 10 MG/1
10 TABLET ORAL DAILY
Status: DISCONTINUED | OUTPATIENT
Start: 2021-06-19 | End: 2021-06-20

## 2021-06-19 RX ADMIN — SODIUM CHLORIDE, PRESERVATIVE FREE 10 ML: 5 INJECTION INTRAVENOUS at 08:23

## 2021-06-19 RX ADMIN — PRAVASTATIN SODIUM 40 MG: 20 TABLET ORAL at 08:23

## 2021-06-19 RX ADMIN — ONDANSETRON 4 MG: 2 INJECTION INTRAMUSCULAR; INTRAVENOUS at 20:13

## 2021-06-19 RX ADMIN — CETIRIZINE HYDROCHLORIDE 10 MG: 10 TABLET, FILM COATED ORAL at 08:23

## 2021-06-19 RX ADMIN — PROMETHAZINE HYDROCHLORIDE 6.25 MG: 25 INJECTION INTRAMUSCULAR; INTRAVENOUS at 04:07

## 2021-06-19 RX ADMIN — ACETAMINOPHEN 650 MG: 325 TABLET ORAL at 22:15

## 2021-06-19 RX ADMIN — Medication 2000 UNITS: at 08:23

## 2021-06-19 RX ADMIN — ONDANSETRON 4 MG: 2 INJECTION INTRAMUSCULAR; INTRAVENOUS at 01:56

## 2021-06-19 RX ADMIN — HYDROCODONE BITARTRATE AND ACETAMINOPHEN 1 TABLET: 7.5; 325 TABLET ORAL at 01:56

## 2021-06-19 RX ADMIN — ALPRAZOLAM 0.5 MG: 0.5 TABLET ORAL at 02:03

## 2021-06-19 RX ADMIN — ONDANSETRON 4 MG: 2 INJECTION INTRAMUSCULAR; INTRAVENOUS at 03:18

## 2021-06-19 RX ADMIN — SODIUM CHLORIDE, PRESERVATIVE FREE 10 ML: 5 INJECTION INTRAVENOUS at 20:15

## 2021-06-19 RX ADMIN — HYDROCODONE BITARTRATE AND ACETAMINOPHEN 1 TABLET: 7.5; 325 TABLET ORAL at 20:12

## 2021-06-19 RX ADMIN — ASPIRIN 81 MG: 81 TABLET, COATED ORAL at 08:23

## 2021-06-19 NOTE — ED NOTES
Dr. Camejo called per ADEBAYO Charles with answer. Call transferred.      Kishan Zheng  06/18/21 2046

## 2021-06-19 NOTE — CONSULTS
"Cardiology Consult   [facility] cardiology      Reason for consultation:  · NSTEMI    Requesting provider: Foreign Garza DO  Consulting provider: Uvaldo Camejo MD    IDENTIFICATION: 55-year-old female who resides in [place], [place]      Active Hospital Problems    Diagnosis  POA   • **NSTEMI (non-ST elevated myocardial infarction) (CMS/[facility]) [I21.4]  Yes     Priority: High              55-year-old female with a history of \"heart attack\" 12 years ago following her mother's death.  The patient states that no stents were placed at that time.  She was cared for at [facility] and there are no documents available for review.    Earlier tonight, there was an argument among family members at her house.  She developed severe chest pain symptoms.  She presented to the [facility] emergency room.  EKG was unremarkable but initial troponin was 0.424.  Second troponin measured 1.68.  Despite heparin infusion and multiple sublingual nitroglycerin, the patient continued to have chest pain in the emergency room.  Urgent cardiac catheterization has been recommended.    Allergies   Allergen Reactions   • Metoprolol Delirium   • Flagyl [Metronidazole] Nausea And Vomiting       Prior to Admission medications    Medication Sig Start Date End Date Taking? Authorizing Provider   acetaminophen (Tylenol 8 Hour) 650 MG 8 hr tablet Take 1 tablet by mouth Every 8 (Eight) Hours As Needed for Mild Pain . 3/28/21   Rodolfo Espinoza PA-C   aspirin 81 MG EC tablet Take 81 mg by mouth Daily. On hold this week.    Provider, MD Bj   bisacodyl (Dulcolax) 5 MG EC tablet Take 2 at 3pm and 2 at 7pm day prior to colonoscopy. 5/11/21   Lynn Durham MD   cetirizine (zyrTEC) 10 MG tablet Take 10 mg by mouth Daily.    Provider, MD Bj   Cholecalciferol (VITAMIN D) 2000 UNITS tablet Take 2,000 Units by mouth Daily.    ProviderBj MD   diazePAM (VALIUM) 5 MG tablet Take 5 mg by mouth Daily As Needed. " 9/2/20   Bj Fish MD   estradiol (ESTRACE) 0.1 MG/GM vaginal cream Insert 2 g into the vagina 3 (Three) Times a Week. 5/24/21   Tong Sevilla MD   lisinopril (PRINIVIL,ZESTRIL) 10 MG tablet Take 10 mg by mouth Daily. 12/27/16   Bj Fish MD   meclizine (ANTIVERT) 25 MG tablet Take 1 tablet by mouth 4 (Four) Times a Day As Needed for dizziness. 3/30/19   Nando Braswell DO   naproxen (NAPROSYN) 375 MG tablet Take 1 tablet by mouth 2 (Two) Times a Day As Needed for Mild Pain . 3/28/21   Rodolfo Espinoza PA-C   polyethylene glycol (MIRALAX) 17 g packet Take 17 g by mouth Daily. 5/24/21   Tong Sevilla MD   pravastatin (PRAVACHOL) 40 MG tablet Take 40 mg by mouth daily.    Bj Fish MD   VITAMIN D PO Take 2 tablets by mouth Daily.    Bj Fish MD       Past Medical History:   Diagnosis Date   • Back pain    • Cataract 05/20/2021   • Elevated cholesterol    • Frequent headaches    • Hyperlipidemia    • Hypertension    • PONV (postoperative nausea and vomiting)        Past Surgical History:   Procedure Laterality Date   • CATARACT EXTRACTION WITH INTRAOCULAR LENS IMPLANT Left 05/20/2021   • CHOLECYSTECTOMY     • COLONOSCOPY     • COLONOSCOPY N/A 5/21/2021    Procedure: COLONOSCOPY;  Surgeon: Lynn Durham MD;  Location: [facility] ENDOSCOPY;  Service: Gastroenterology;  Laterality: N/A;   • EYE SURGERY     • HYSTERECTOMY  2012       Family History   Problem Relation Age of Onset   • Heart disease Mother    • Diabetes Mother    • Breast cancer Mother    • Heart disease Father    • Diabetes Father    • Colon cancer Neg Hx    • Esophageal cancer Neg Hx    • Liver cancer Neg Hx    • Liver disease Neg Hx    • Stomach cancer Neg Hx        Social History     Tobacco Use   Smoking Status Never Smoker   Smokeless Tobacco Never Used       Social History     Substance and Sexual Activity   Alcohol Use No         Review of Systems:   Review of Systems    Cardiovascular: Positive for chest pain.            Vital Sign Min/Max for last 24 hours  Temp  Min: 97.9 °F (36.6 °C)  Max: 97.9 °F (36.6 °C)   BP  Min: 92/61  Max: 126/78   Pulse  Min: 75  Max: 90   Resp  Min: 16  Max: 16   SpO2  Min: 95 %  Max: 98 %   No data recorded    No intake or output data in the 24 hours ending 06/18/21 2350          Constitutional:       Appearance: Healthy appearance. Well-developed.   Eyes:      General: No scleral icterus.  HENT:      Head: Normocephalic and atraumatic.   Neck:      Vascular: No carotid bruit or JVD. JVD normal.   Pulmonary:      Effort: Pulmonary effort is normal.      Breath sounds: Normal breath sounds.   Cardiovascular:      Normal rate. Regular rhythm.      Murmurs: There is no murmur.      No gallop.   Musculoskeletal:      Extremities: No clubbing present.Skin:     General: Skin is warm and dry. There is no cyanosis.   Neurological:      Mental Status: Alert.   Psychiatric:         Attention and Perception: Attention normal.         Behavior: Behavior normal.          Tele: Sinus    DATA REVIEW:      CXR (6/18/2021): No acute process          Results from last 7 days   Lab Units 06/18/21 1959   SODIUM mmol/L 140   POTASSIUM mmol/L 3.9   CHLORIDE mmol/L 106   BUN mg/dL 14   CREATININE mg/dL 0.73     Results from last 7 days   Lab Units 06/18/21  2221 06/18/21 1959   TROPONIN T ng/mL 1.680* 0.424*     Results from last 7 days   Lab Units 06/18/21 1959   WBC 10*3/mm3 11.37*   HEMOGLOBIN g/dL 14.4   HEMATOCRIT % 43.1   PLATELETS 10*3/mm3 232     No results found for: HGBA1C     Lab Results   Component Value Date    TRIG 146 05/25/2016    HDL 57 05/25/2016     (H) 05/25/2016    AST 19 06/18/2021    ALT 10 06/18/2021              NSTEMI  · Increasing troponin with ongoing chest pain despite treatment with heparin and nitroglycerin  · Cardiac catheterization recommended for NSTEMI with moderate risk GLENNA score               · Emergent cardiac  catheterization via right radial approach  · Further recommendations to follow      Electronically signed by William Camejo IV, MD, 06/18/21, 11:47 PM EDT.        Addendum:    Cardiac catheterization performed via right radial approach.  Coronary arteries were minimally diseased.  LV wall motion abnormality consistent with stress-induced cardiomyopathy.  Estimated LVEF 30%.    Recommendations:  · Discontinue heparin  · Treat HFrEF with long-acting metoprolol, lisinopril  · Treat chest pain with narcotics and benzodiazepine    H. C. Uvaldo Camejo MD [facility], [facility]  Interventional and General Cardiology

## 2021-06-19 NOTE — ED NOTES
Dr. Camejo called per ADEBAYO Charles without answer. Voicemail left.      Kishan Zheng  06/18/21 6709

## 2021-06-19 NOTE — ED NOTES
Dr. Dickerson called per ADEBAYO Charles with answer. Call transferred.      Kishan Zheng  06/18/21 2053

## 2021-06-19 NOTE — CASE MANAGEMENT/SOCIAL WORK
Discharge Planning Assessment  [facility]     Patient Name: Shilpa Vides  MRN: [id number]  Today's Date: 6/19/2021    Admit Date: 6/18/2021    Discharge Needs Assessment     Row Name 06/19/21 1443       Living Environment    Lives With  spouse    Name(s) of Who Lives With Patient  Yung Vides/spouse    Current Living Arrangements  other (see comments) Double-wide mobile home    Primary Care Provided by  self    Provides Primary Care For  no one    Family Caregiver if Needed  spouse    Quality of Family Relationships  supportive       Resource/Environmental Concerns    Transportation Concerns  car, none       Transition Planning    Patient/Family Anticipates Transition to  home    Patient/Family Anticipated Services at Transition  none    Transportation Anticipated  family or friend will provide       Discharge Needs Assessment    Readmission Within the Last 30 Days  no previous admission in last 30 days    Equipment Currently Used at Home  none    Concerns to be Addressed  no discharge needs identified    Anticipated Changes Related to Illness  none    Equipment Needed After Discharge  none    Provided Post Acute Provider List?  N/A    Provided Post Acute Provider Quality & Resource List?  N/A        Discharge Plan     Row Name 06/19/21 1446       Plan    Plan Comments  Pt sleeping but awakened with verbal stimuli.  Pt confirmed address of [address] [place], [place] [address], [place] [address], PCP, telephone number and --spouse, Yung Vides.  Pt reported she does not have a POA or a Living Will.  Information provided.   Pt denied having any medical equipment at home and denied needs at this time.  Pt reported not having HH visiting.  [place] plans [place] at [place].        Continued Care and Services - Admitted Since 6/18/2021    Coordination has not been started for this encounter.         Demographic Summary     Row Name 06/19/21 1433       General Information    Admission Type  inpatient    Arrived From  home     Expected Length of Stay (LOS)  72hr    Referral Source  admission list    Reason for Consult  discharge planning    Preferred Language  English     Used During This Interaction  no       Contact Information    Permission Granted to Share Info With  family/designee;    Contact Information Obtained for      Contact Information Comments  Spouse/Yung Vides/[phone] (cell)        Functional Status     Row Name 06/19/21 1443       Functional Status    Usual Activity Tolerance  good    Current Activity Tolerance  good       Functional Status, IADL    Meal Preparation  independent    Housekeeping  independent    Laundry  independent    Shopping  independent        Psychosocial    No documentation.       Abuse/Neglect    No documentation.       Legal    No documentation.       Substance Abuse    No documentation.       Patient Forms    No documentation.           Anamika Brooks RN

## 2021-06-19 NOTE — PROGRESS NOTES
Cardiology Progress Note      Reason for visit:    · Stress-induced cardiomyopathy    IDENTIFICATION: 55-year-old female from [place], [place]    Active Hospital Problems    Diagnosis  POA   • **Stress-induced cardiomyopathy [I51.81]  Yes     Priority: High     · History of stress-induced cardiomyopathy after mother's deat,h circa 2008  · [facility] ER presentation with acute chest pain after emotional argument and elevated troponin, 6/18/2021  · Cardiac catheterization (6/19/2021): Minimal CAD.  LVEF 30% and pattern of Takotsubo cardiomyopathy.              · Patient doing better although having intermittent chest discomfort  · No orthopnea, PND or shortness of breath           Vital Sign Min/Max for last 24 hours  Temp  Min: 97.6 °F (36.4 °C)  Max: 98 °F (36.7 °C)   BP  Min: 83/53  Max: 126/78   Pulse  Min: 69  Max: 90   Resp  Min: 16  Max: 18   SpO2  Min: 93 %  Max: 98 %   No data recorded      Intake/Output Summary (Last 24 hours) at 6/19/2021 1356  Last data filed at 6/19/2021 1300  Gross per 24 hour   Intake 991 ml   Output 150 ml   Net 841 ml           Physical Exam  Constitutional:       Appearance: Normal appearance.   HENT:      Head: Normocephalic.   Cardiovascular:      Rate and Rhythm: Normal rate and regular rhythm.      Heart sounds: No murmur heard.     Pulmonary:      Effort: Pulmonary effort is normal.      Breath sounds: No wheezing or rhonchi.   Neurological:      Mental Status: She is alert.         Tele: Sinus    Results Review (reviewed the patient's recent labs in the electronic medical record):     EKG (3/19/2021): Normal    ECHO pending    Result Review:      Results from last 7 days   Lab Units 06/19/21  0210 06/18/21 1959   SODIUM mmol/L 142 140   POTASSIUM mmol/L 4.5 3.9   CHLORIDE mmol/L 108* 106   BUN mg/dL 14 14   CREATININE mg/dL 0.68 0.73     Results from last 7 days   Lab Units 06/18/21 2221 06/18/21 1959   TROPONIN T ng/mL 1.680* 0.424*     Results from last 7 days   Lab Units  06/18/21 1959   WBC 10*3/mm3 11.37*   HEMOGLOBIN g/dL 14.4   HEMATOCRIT % 43.1   PLATELETS 10*3/mm3 232       Lab Results   Component Value Date    HGBA1C 4.90 06/18/2021       Lab Results   Component Value Date    TRIG 146 05/25/2016    HDL 57 05/25/2016     (H) 05/25/2016    AST 66 (H) 06/19/2021    ALT 22 06/19/2021              Stress-induced cardiomyopathy  · Continue metoprolol succinate and lisinopril  · Repeat echo tomorrow or Monday  · If LVEF improved >35%, may be discharged home without LifeVest             · Echo tomorrow or Monday  · If LVEF >35%, patient may be discharged home with a LifeVest  · Continue metoprolol succinate and lisinopril for HFrEF  · Follow-up with me in my [facility] in 4 to 6 weeks    Electronically signed by William Camejo IV, MD, 06/19/21, 1:56 PM EDT.

## 2021-06-19 NOTE — H&P
Chest pain    [facility]      Name:  Shilpa Vides   Age:  55 y.o.  Sex:  female  :  1965  MRN:  [id number]   Visit Number:  [id number]  Admission Date:  2021  Date Of Service:  21  Primary Care Physician:  Spring Lockett APRN    Chief Complaint:     Chest pain    History Of Presenting Illness:    55-year-old female history of hypertension hyperlipidemia, previous MI after her mother's death 10 years ago.  Presents with 3-hour complaint of chest pain, with numbness down left arm.  She also had some nausea without vomiting.  She denies cough, hemoptysis, pleurisy or shortness of breath.  Apparently patient was at her relatives house, an argument ensued and then she had chest pain.  Troponin noted to be approximately 0.42 in ER.  EKG shows no acute changes.  Dr. Camejo was called, recommended aspirin and heparin and possible cardiac cath tomorrow.  Patient complains of of chest pain 8/10.  Patient has received aspirin, going to receive morphine and possibly nitroglycerin.  Other labs reviewed and EKG reviewed.  Chest ray showed no active disease.  Family history of coronary disease as well.  Patient is a non-smoker.    Review Of Systems:  General: Patient denies any fevers, chills or loss of consciousness.   Psychological: Denies any hallucinations and delusions, no homicidal or suicidal ideations.  Ophthalmic: Denies any diplopia or transient loss of vision.  ENT: Denies sore throat, nasal congestion or ear pain.   Allergy and Immunology: Denies rash or itching.  Hematological and Lymphatic: Denies neck swelling or easy bleeding.  Endocrine: Denies any recent unintentional weight gain or loss.  Respiratory: Denies cough, shortness of breath, hemoptysis, or pleurisy.  Cardiovascular: See HPI  Gastrointestinal: Denies vomiting. Denies any abdominal pain. No diarrhea.  Denies melena, hematemesis or hematochezia.    Genitourinary: Denies dysuria or  hematuria.  Neurological: Denies any focal weakness. No loss of consciousness. Denies any numbness. Denies neck pain. Denies visual changes.   Dermatological: Denies any redness or pruritis, or rash.    ROS otherwise reviewed in detail and felt to be noncontributing.     Past Medical History:    Past Medical History:   Diagnosis Date   • Back pain    • Cataract 05/20/2021   • Elevated cholesterol    • Frequent headaches    • Hyperlipidemia    • Hypertension    • PONV (postoperative nausea and vomiting)        Past Surgical history:    Past Surgical History:   Procedure Laterality Date   • CATARACT EXTRACTION WITH INTRAOCULAR LENS IMPLANT Left 05/20/2021   • CHOLECYSTECTOMY     • COLONOSCOPY     • COLONOSCOPY N/A 5/21/2021    Procedure: COLONOSCOPY;  Surgeon: Lynn Durham MD;  Location: [facility] ENDOSCOPY;  Service: Gastroenterology;  Laterality: N/A;   • EYE SURGERY     • HYSTERECTOMY  2012       Social History:    Social History     Socioeconomic History   • Marital status:      Spouse name: Not on file   • Number of children: Not on file   • Years of education: Not on file   • Highest education level: Not on file   Tobacco Use   • Smoking status: Never Smoker   • Smokeless tobacco: Never Used   Vaping Use   • Vaping Use: Never used   Substance and Sexual Activity   • Alcohol use: No   • Drug use: No   • Sexual activity: Defer       Family History:    Family History   Problem Relation Age of Onset   • Heart disease Mother    • Diabetes Mother    • Breast cancer Mother    • Heart disease Father    • Diabetes Father    • Colon cancer Neg Hx    • Esophageal cancer Neg Hx    • Liver cancer Neg Hx    • Liver disease Neg Hx    • Stomach cancer Neg Hx        Allergies:      Metoprolol and Flagyl [metronidazole]    Home Medications:    Prior to Admission Medications     Prescriptions Last Dose Informant Patient Reported? Taking?    acetaminophen (Tylenol 8 Hour) 650 MG 8 hr tablet  Self No No    Take 1 tablet by  mouth Every 8 (Eight) Hours As Needed for Mild Pain .    aspirin 81 MG EC tablet  Self Yes No    Take 81 mg by mouth Daily. On hold this week.    bisacodyl (Dulcolax) 5 MG EC tablet  Self No No    Take 2 at 3pm and 2 at 7pm day prior to colonoscopy.    cetirizine (zyrTEC) 10 MG tablet  Self Yes No    Take 10 mg by mouth Daily.    Cholecalciferol (VITAMIN D) 2000 UNITS tablet  Self Yes No    Take 2,000 Units by mouth Daily.    diazePAM (VALIUM) 5 MG tablet  Self Yes No    Take 5 mg by mouth Daily As Needed.    estradiol (ESTRACE) 0.1 MG/GM vaginal cream   No No    Insert 2 g into the vagina 3 (Three) Times a Week.    lisinopril (PRINIVIL,ZESTRIL) 10 MG tablet  Self Yes No    Take 10 mg by mouth Daily.    meclizine (ANTIVERT) 25 MG tablet  Self No No    Take 1 tablet by mouth 4 (Four) Times a Day As Needed for dizziness.    meclizine (ANTIVERT) tablet 25 mg   No No    naproxen (NAPROSYN) 375 MG tablet  Self No No    Take 1 tablet by mouth 2 (Two) Times a Day As Needed for Mild Pain .    polyethylene glycol (MIRALAX) 17 g packet   No No    Take 17 g by mouth Daily.    pravastatin (PRAVACHOL) 40 MG tablet  Self Yes No    Take 40 mg by mouth daily.    VITAMIN D PO  Self Yes No    Take 2 tablets by mouth Daily.             ED Medications:    Medications   sodium chloride 0.9 % flush 10 mL (has no administration in time range)   heparin (porcine) injection 3,400 Units (has no administration in time range)   heparin 54100 units/250 ml (100 units/ml) in D5W (has no administration in time range)   heparin (porcine) 1000 units/mL injection 3,400 Units (has no administration in time range)   heparin (porcine) 1000 units/mL injection 1,700 Units (has no administration in time range)   nitroglycerin (NITROSTAT) SL tablet 0.4 mg (has no administration in time range)   ondansetron (ZOFRAN) injection 4 mg (has no administration in time range)   aspirin chewable tablet 324 mg (324 mg Oral Given 6/18/21 2039)   Morphine sulfate (PF)  injection 4 mg (4 mg Intravenous Given 6/18/21 2115)       Vital Signs:    Temp:  [97.9 °F (36.6 °C)] 97.9 °F (36.6 °C)  Heart Rate:  [75] 75  Resp:  [16] 16  BP: (126)/(78) 126/78        06/18/21 1941   Weight: 56.7 kg (125 lb)       Body mass index is 21.46 kg/m².    Physical Exam:    General Appearance:  Alert and cooperative, not in any acute distress.   Head:  Atraumatic and normocephalic, without obvious abnormality.   Eyes:          PERRLA, conjunctivae and sclerae normal, no icterus. No pallor. Extraocular movements are within normal limits.   Throat: No oral lesions, no thrush, oral mucosa moist.   Neck: Supple, trachea midline, no thyromegaly, no carotid bruit. No JVD.   Back:   No kyphoscoliosis present. No tenderness to palpation,   range of motion normal.   Lungs:   Chest shape is normal. Breath sounds heard bilaterally equally.  No crackles or wheezing. No pleural rub or bronchial breathing. Otherwise lungs are clear.   Heart:  Normal S1 and S2, no murmur, no gallop, no rub. No JVD. Regular rate and rhythm.   Abdomen:   Normal bowel sounds, no masses, no organomegaly. Soft nontender, nondistended, no guarding, no rebound tenderness.   Extremities: Moves all extremities well, no edema, no cyanosis, no clubbing.   Pulses: Pulses palpable and equal bilaterally.   Skin: No bleeding, bruising or rash.   Neurologic: Alert and oriented x 3. Moves all four limbs equally. No tremors. No facial asymetry. Cranial nerves 2-12 intact. Musculosensory exam intact.   Psychiatric: Mood and affect normal. Denies homicidal or suicidal ideations.     Laboratory data:    I have reviewed the labs done in the emergency room.    Results from last 7 days   Lab Units 06/18/21 1959   SODIUM mmol/L 140   POTASSIUM mmol/L 3.9   CHLORIDE mmol/L 106   CO2 mmol/L 22.5   BUN mg/dL 14   CREATININE mg/dL 0.73   CALCIUM mg/dL 9.7   BILIRUBIN mg/dL 0.2   ALK PHOS U/L 67   ALT (SGPT) U/L 10   AST (SGOT) U/L 19   GLUCOSE mg/dL 115*      Results from last 7 days   Lab Units 06/18/21 1959   WBC 10*3/mm3 11.37*   HEMOGLOBIN g/dL 14.4   HEMATOCRIT % 43.1   PLATELETS 10*3/mm3 232         Results from last 7 days   Lab Units 06/18/21 1959   TROPONIN T ng/mL 0.424*                           Invalid input(s): USDES,  BLOODU, NITRITITE, BACT, EP  Pain Management Panel    There is no flowsheet data to display.             EKG:      Reviewed, normal sinus rhythm    Radiology:    Imaging Results (Last 72 Hours)     Procedure Component Value Units Date/Time    XR Chest 1 View [[id number]] Resulted: 06/18/21 2005     Updated: 06/18/21 2005          Assessment:  1.  Non-STEMI, possibly stress-induced.  Had previous non-STEMI with the death of her mother approximately 10 years ago.  She does not have cardiac stents or CABG.  2.  History of hypertension/hyperlipidemia      Plan:   She is going to receive morphine in ER for chest pain.  She is going to receive her heparin bolus, continue heparin.  Trend troponins.  Nitroglycerin, morphine, heparin, aspirin.  Cardiology has been called and is aware of the patient.  I will put in a formal consult in with Dr. Camejo.  Order cardiac 2D echo.  Repeat EKG in a.m.    See cardiac cath note done tonight.  No evidence of significant coronary disease.  Patient with ejection fraction of 30%.  Patient with Takotsubo like syndrome.  Discontinue heparin.  Continue pain meds.  Continue aspirin.    Abhi Frye MD  06/18/21  21:23 EDT    Part of this note may be an electronic transcription/translation of spoken language to printed text using the Dragon Dictation System.

## 2021-06-19 NOTE — ED NOTES
Dr. Dickerson called per ADEBAYO Charles with answer. Call transferred.      Kishan Zheng  06/18/21 0443

## 2021-06-19 NOTE — PAYOR COMM NOTE
"  Isabela Anthony -[phone] fax [phone]  clincals for [id number]    Ruma Vides (55 y.o. Female)     Date of Birth Social Security Number Address Home Phone MRN    1965  [address]   [facility] [address] [phone] [id number]    Taoist Marital Status          None        Admission Date Admission Type Admitting Provider Attending Provider Department, Room/Bed    21 Emergency Abhi Frye MD Pais, Roshan, MD [facility] MED SURG  3, 307/1    Discharge Date Discharge Disposition Discharge Destination                       Attending Provider: Mj Blanchard MD    Allergies: Metoprolol, Flagyl [Metronidazole]    Isolation: None   Infection: None   Code Status: CPR    Ht: 167.6 cm (66\")   Wt: 25.8 kg (56 lb 14.4 oz)    Admission Cmt: None   Principal Problem: Stress-induced cardiomyopathy [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                 Active Insurance as of 2021     Primary Coverage     Payor Plan Insurance Group Employer/Plan Group    [facility] COMMUNITY PLAN [facility] COMMUNITY PLAN [facility]     Payor Plan Address Payor Plan Phone Number Payor Plan Fax Number Effective Dates    [address]   2021 - None Entered    [facility] [address]       Subscriber Name Subscriber Birth Date Member ID       RUMA VIDES 1965 [id number]                 Emergency Contacts      (Rel.) Home Phone Work Phone Mobile Phone    Yung Vides (Spouse) -- -- [phone]    OSHAM ESPARZA (Brother) [phone] -- --               History & Physical      Abhi Frye MD at 21 2123          Chest [facility]    [facility]   [facility]ISTORY AND PHYSICAL      Name:  Ruma Vides   Age:  55 y.o.  Sex:  female  :  1965  MRN:  [id number]   Visit Number:  [id number]  Admission Date:  2021  Date Of Service:  21  Primary Care Physician:  Spring Lockett, APRN    Chief Complaint:     Chest pain    History Of Presenting Illness:    "   55-year-old female history of hypertension hyperlipidemia, previous MI after her mother's death 10 years ago.  Presents with 3-hour complaint of chest pain, with numbness down left arm.  She also had some nausea without vomiting.  She denies cough, hemoptysis, pleurisy or shortness of breath.  Apparently patient was at her relatives house, an argument ensued and then she had chest pain.  Troponin noted to be approximately 0.42 in ER.  EKG shows no acute changes.  Dr. Camejo was called, recommended aspirin and heparin and possible cardiac cath tomorrow.  Patient complains of of chest pain 8/10.  Patient has received aspirin, going to receive morphine and possibly nitroglycerin.  Other labs reviewed and EKG reviewed.  Chest ray showed no active disease.  Family history of coronary disease as well.  Patient is a non-smoker.    Review Of Systems:  General: Patient denies any fevers, chills or loss of consciousness.   Psychological: Denies any hallucinations and delusions, no homicidal or suicidal ideations.  Ophthalmic: Denies any diplopia or transient loss of vision.  ENT: Denies sore throat, nasal congestion or ear pain.   Allergy and Immunology: Denies rash or itching.  Hematological and Lymphatic: Denies neck swelling or easy bleeding.  Endocrine: Denies any recent unintentional weight gain or loss.  Respiratory: Denies cough, shortness of breath, hemoptysis, or pleurisy.  Cardiovascular: See HPI  Gastrointestinal: Denies vomiting. Denies any abdominal pain. No diarrhea.  Denies melena, hematemesis or hematochezia.    Genitourinary: Denies dysuria or hematuria.  Neurological: Denies any focal weakness. No loss of consciousness. Denies any numbness. Denies neck pain. Denies visual changes.   Dermatological: Denies any redness or pruritis, or rash.    ROS otherwise reviewed in detail and felt to be noncontributing.     Past Medical History:    Past Medical History:   Diagnosis Date   • Back pain    • Cataract  05/20/2021   • Elevated cholesterol    • Frequent headaches    • Hyperlipidemia    • Hypertension    • PONV (postoperative nausea and vomiting)        Past Surgical history:    Past Surgical History:   Procedure Laterality Date   • CATARACT EXTRACTION WITH INTRAOCULAR LENS IMPLANT Left 05/20/2021   • CHOLECYSTECTOMY     • COLONOSCOPY     • COLONOSCOPY N/A 5/21/2021    Procedure: COLONOSCOPY;  Surgeon: Lynn Durham MD;  Location: [facility] ENDOSCOPY;  Service: Gastroenterology;  Laterality: N/A;   • EYE SURGERY     • HYSTERECTOMY  2012       Social History:    Social History     Socioeconomic History   • Marital status:      Spouse name: Not on file   • Number of children: Not on file   • Years of education: Not on file   • Highest education level: Not on file   Tobacco Use   • Smoking status: Never Smoker   • Smokeless tobacco: Never Used   Vaping Use   • Vaping Use: Never used   Substance and Sexual Activity   • Alcohol use: No   • Drug use: No   • Sexual activity: Defer       Family History:    Family History   Problem Relation Age of Onset   • Heart disease Mother    • Diabetes Mother    • Breast cancer Mother    • Heart disease Father    • Diabetes Father    • Colon cancer Neg Hx    • Esophageal cancer Neg Hx    • Liver cancer Neg Hx    • Liver disease Neg Hx    • Stomach cancer Neg Hx        Allergies:      Metoprolol and Flagyl [metronidazole]    Home Medications:    Prior to Admission Medications     Prescriptions Last Dose Informant Patient Reported? Taking?    acetaminophen (Tylenol 8 Hour) 650 MG 8 hr tablet  Self No No    Take 1 tablet by mouth Every 8 (Eight) Hours As Needed for Mild Pain .    aspirin 81 MG EC tablet  Self Yes No    Take 81 mg by mouth Daily. On hold this week.    bisacodyl (Dulcolax) 5 MG EC tablet  Self No No    Take 2 at 3pm and 2 at 7pm day prior to colonoscopy.    cetirizine (zyrTEC) 10 MG tablet  Self Yes No    Take 10 mg by mouth Daily.    Cholecalciferol (VITAMIN D) 2000  UNITS tablet  Self Yes No    Take 2,000 Units by mouth Daily.    diazePAM (VALIUM) 5 MG tablet  Self Yes No    Take 5 mg by mouth Daily As Needed.    estradiol (ESTRACE) 0.1 MG/GM vaginal cream   No No    Insert 2 g into the vagina 3 (Three) Times a Week.    lisinopril (PRINIVIL,ZESTRIL) 10 MG tablet  Self Yes No    Take 10 mg by mouth Daily.    meclizine (ANTIVERT) 25 MG tablet  Self No No    Take 1 tablet by mouth 4 (Four) Times a Day As Needed for dizziness.    meclizine (ANTIVERT) tablet 25 mg   No No    naproxen (NAPROSYN) 375 MG tablet  Self No No    Take 1 tablet by mouth 2 (Two) Times a Day As Needed for Mild Pain .    polyethylene glycol (MIRALAX) 17 g packet   No No    Take 17 g by mouth Daily.    pravastatin (PRAVACHOL) 40 MG tablet  Self Yes No    Take 40 mg by mouth daily.    VITAMIN D PO  Self Yes No    Take 2 tablets by mouth Daily.             ED Medications:    Medications   sodium chloride 0.9 % flush 10 mL (has no administration in time range)   heparin (porcine) injection 3,400 Units (has no administration in time range)   heparin 67691 units/250 ml (100 units/ml) in D5W (has no administration in time range)   heparin (porcine) 1000 units/mL injection 3,400 Units (has no administration in time range)   heparin (porcine) 1000 units/mL injection 1,700 Units (has no administration in time range)   nitroglycerin (NITROSTAT) SL tablet 0.4 mg (has no administration in time range)   ondansetron (ZOFRAN) injection 4 mg (has no administration in time range)   aspirin chewable tablet 324 mg (324 mg Oral Given 6/18/21 2039)   Morphine sulfate (PF) injection 4 mg (4 mg Intravenous Given 6/18/21 2115)       Vital Signs:    Temp:  [97.9 °F (36.6 °C)] 97.9 °F (36.6 °C)  Heart Rate:  [75] 75  Resp:  [16] 16  BP: (126)/(78) 126/78        06/18/21 1941   Weight: 56.7 kg (125 lb)       Body mass index is 21.46 kg/m².    Physical Exam:    General Appearance:  Alert and cooperative, not in any acute distress.      Head:  Atraumatic and normocephalic, without obvious abnormality.   Eyes:          PERRLA, conjunctivae and sclerae normal, no icterus. No pallor. Extraocular movements are within normal limits.   Throat: No oral lesions, no thrush, oral mucosa moist.   Neck: Supple, trachea midline, no thyromegaly, no carotid bruit. No JVD.   Back:   No kyphoscoliosis present. No tenderness to palpation,   range of motion normal.   Lungs:   Chest shape is normal. Breath sounds heard bilaterally equally.  No crackles or wheezing. No pleural rub or bronchial breathing. Otherwise lungs are clear.   Heart:  Normal S1 and S2, no murmur, no gallop, no rub. No JVD. Regular rate and rhythm.   Abdomen:   Normal bowel sounds, no masses, no organomegaly. Soft nontender, nondistended, no guarding, no rebound tenderness.   Extremities: Moves all extremities well, no edema, no cyanosis, no clubbing.   Pulses: Pulses palpable and equal bilaterally.   Skin: No bleeding, bruising or rash.   Neurologic: Alert and oriented x 3. Moves all four limbs equally. No tremors. No facial asymetry. Cranial nerves 2-12 intact. Musculosensory exam intact.   Psychiatric: Mood and affect normal. Denies homicidal or suicidal ideations.     Laboratory data:    I have reviewed the labs done in the emergency room.    Results from last 7 days   Lab Units 06/18/21 1959   SODIUM mmol/L 140   POTASSIUM mmol/L 3.9   CHLORIDE mmol/L 106   CO2 mmol/L 22.5   BUN mg/dL 14   CREATININE mg/dL 0.73   CALCIUM mg/dL 9.7   BILIRUBIN mg/dL 0.2   ALK PHOS U/L 67   ALT (SGPT) U/L 10   AST (SGOT) U/L 19   GLUCOSE mg/dL 115*     Results from last 7 days   Lab Units 06/18/21 1959   WBC 10*3/mm3 11.37*   HEMOGLOBIN g/dL 14.4   HEMATOCRIT % 43.1   PLATELETS 10*3/mm3 232         Results from last 7 days   Lab Units 06/18/21 1959   TROPONIN T ng/mL 0.424*                           Invalid input(s): USDES,  BLOODU, NITRITITE, BACT, EP  Pain Management Panel    There is no flowsheet data  to display.             EKG:      Reviewed, normal sinus rhythm    Radiology:    Imaging Results (Last 72 Hours)     Procedure Component Value Units Date/Time    XR Chest 1 View [[id number]] Resulted: 06/18/21 2005     Updated: 06/18/21 2005          Assessment:  1.  Non-STEMI, possibly stress-induced.  Had previous non-STEMI with the death of her mother approximately 10 years ago.  She does not have cardiac stents or CABG.  2.  History of hypertension/hyperlipidemia      Plan:   She is going to receive morphine in ER for chest pain.  She is going to receive her heparin bolus, continue heparin.  Trend troponins.  Nitroglycerin, morphine, heparin, aspirin.  Cardiology has been called and is aware of the patient.  I will put in a formal consult in with Dr. Camejo.  Order cardiac 2D echo.  Repeat EKG in a.m.    See cardiac cath note done tonight.  No evidence of significant coronary disease.  Patient with ejection fraction of 30%.  Patient with Takotsubo like syndrome.  Discontinue heparin.  Continue pain meds.  Continue aspirin.    Abhi Frye MD  06/18/21  21:23 EDT    Part of this note may be an electronic transcription/translation of spoken language to printed text using the Dragon Dictation System.    Electronically signed by Abhi Frye MD at 06/19/21 0141          Emergency Department Notes      Melvin Harden PA-C at 06/18/21 2023     Attestation signed by Kj Garza DO at 06/19/21 0004    Critical Care  Performed by: Melvin Harden PA-C  Authorized by: Kj Garza DO     Critical care provider statement:     Critical care time (minutes): 40    Critical care time was exclusive of:  Separately billable procedures and treating other patients    Critical care was necessary to treat or prevent imminent or life-threatening deterioration of the following conditions: Unstable angina, elevated troponin, acute coronary syndrome, other    Critical care was time spent  "personally by me on the following activities:  Ordering and performing treatments and interventions, development of treatment plan with patient or surrogate, discussions with consultants, evaluation of patient's response to treatment, examination of patient, ordering and review of laboratory studies, ordering and review of radiographic studies, pulse oximetry, re-evaluation of patient's condition and review of old charts          For this patient encounter, I reviewed the NP or PA documentation, treatment plan, and medical decision making. Kj Garza,  6/19/2021 00:04 EDT                  Subjective   Chief Complaint: Chest pain, left arm pain  History of Present Illness: 55-year-old female comes in for evaluation of complaints.  She has a history of high cholesterol, hypertension.  She states 20 years ago she had a \"stress-related heart attack\" no stents or angioplasty but states she did have a cath at the time.  She states at that time it was her mother's death that caused the MI.  She states 1 hour prior to arrival she was in an argument with family and had acute onset of substernal chest tightness with radiation of the left arm and lightheadedness.  Her symptoms are ongoing  Onset: 1 hour ago  Timing: Ongoing  Exacerbating / Alleviating factors: Nothing makes better or worse  Associated symptoms: Nausea, dizzy, chest tightness, left arm pain      Nurses Notes reviewed and agree, including vitals, allergies, social history and prior medical history.          Review of Systems    Past Medical History:   Diagnosis Date   • Back pain    • Cataract 05/20/2021   • Elevated cholesterol    • Frequent headaches    • Hyperlipidemia    • Hypertension    • PONV (postoperative nausea and vomiting)        Allergies   Allergen Reactions   • Metoprolol Delirium   • Flagyl [Metronidazole] Nausea And Vomiting       Past Surgical History:   Procedure Laterality Date   • CATARACT EXTRACTION WITH INTRAOCULAR LENS IMPLANT Left " 05/20/2021   • CHOLECYSTECTOMY     • COLONOSCOPY     • COLONOSCOPY N/A 5/21/2021    Procedure: COLONOSCOPY;  Surgeon: Lynn Durham MD;  Location: [facility] ENDOSCOPY;  Service: Gastroenterology;  Laterality: N/A;   • EYE SURGERY     • HYSTERECTOMY  2012       Family History   Problem Relation Age of Onset   • Heart disease Mother    • Diabetes Mother    • Breast cancer Mother    • Heart disease Father    • Diabetes Father    • Colon cancer Neg Hx    • Esophageal cancer Neg Hx    • Liver cancer Neg Hx    • Liver disease Neg Hx    • Stomach cancer Neg Hx        Social History     Socioeconomic History   • Marital status:      Spouse name: Not on file   • Number of children: Not on file   • Years of education: Not on file   • Highest education level: Not on file   Tobacco Use   • Smoking status: Never Smoker   • Smokeless tobacco: Never Used   Vaping Use   • Vaping Use: Never used   Substance and Sexual Activity   • Alcohol use: No   • Drug use: No   • Sexual activity: Defer           Objective   Physical Exam    Procedures          ED Course  ED Course as of Jun 18 2317 Fri Jun 18, 2021 2014 EKG interpreted by me.  Sinus rhythm.  Rate of 71.  First-degree AV block.  ST or T wave abnormalities.  Abnormal EKG    [CG]   2206 Repeat EKG interpreted by me. Sinus rhythm. Rate of 88. Low voltage in the chest leads. No obvious ST or T wave abnormalities. No ST segment depressions or significant elevations. Abnormal EKG    [CG]      ED Course User Index  [CG] Kj Garza, DO                                           [facility]  2050  Spoke with Dr. Camejo, cardiology, request aspirin and heparin bolus and drip and admit to the hospitalist service with echo if possible, trend troponins and he will cath tomorrow if needed.    2214  Patient states pain 7 out of 10 after for morphine and 2 nitroglycerin.  Repeat EKG reviewed with Dr. Garza no ischemic changes.  Blood pressure 95/64.  Updated Dr. Frye.  He wishes  for me to speak with Dr. Camejo, cardiology again.  Paged Dr. Camejo, voicemail left awaiting callback.    2258  Repeat troponin 1.6.  Patient is laughing pain.  Cardiologist currently in the middle of a cardiac cath.  I did make the house supervisor aware of the need to talk to him ASAP.    8926  Dr. Camejo will take patient to Cath Lab house supervisor and patient's nurse aware  Final diagnoses:   NSTEMI (non-ST elevated myocardial infarction) (CMS/[facility])       ED Disposition  ED Disposition     ED Disposition Condition Comment    Decision to Admit  Level of Care: Telemetry [5]   Diagnosis: NSTEMI (non-ST elevated myocardial infarction) (CMS/[facility]) [[id number]]   Admitting Physician: BANDAR WRIGHT [[id number]]   Attending Physician: BANDAR WRIGHT [[id number]]   Isolate for COVID?: No [0]   Certification: I Certify That Inpatient Hospital Services Are Medically Necessary For Greater Than 2 Midnights            No follow-up provider specified.       Medication List      No changes were made to your prescriptions during this visit.          Melvin Harden PA-C  06/18/21 2317      Electronically signed by Kj Garza DO at 06/19/21 0004     Kishan Zheng at 06/18/21 2045        Dr. Camejo called per ADEBAYO Charles with answer. Call transferred.      Kishan Zheng  06/18/21 2046      Electronically signed by Kishan Zheng at 06/18/21 2046     Kishan Zheng at 06/18/21 2052        Dr. Dickerson called per ADEBAYO Charles with answer. Call transferred.      Kishan Zheng  06/18/21 2053      Electronically signed by Kishan Zheng at 06/18/21 2053     Kishan Zheng at 06/18/21 2058        Per Michelet Jerez Supervisor the patient will be going to 307     Kishan Zheng  06/18/21 2059      Electronically signed by Kishan Zheng at 06/18/21 2059     Kishan Zheng at 06/18/21 2209        Dr. Dickerson called per ADEBAYO Charles with answer. Call transferred.      Kishan Zheng  06/18/21  "2210      Electronically signed by Kishan Zheng at 06/18/21 2210     Kishan Zheng at 06/18/21 2212        Dr. Camejo called per ADEBAYO Charles without answer. Voicemail left.      Kishan Zheng  06/18/21 2213      Electronically signed by Kishan Zheng at 06/18/21 2213       Physician Progress Notes (last 24 hours) (Notes from 06/18/21 0859 through 06/19/21 0859)    No notes of this type exist for this encounter.            Consult Notes (last 24 hours) (Notes from 06/18/21 0859 through 06/19/21 0859)      William Camejo IV, MD at 06/18/21 2347          Cardiology Consult   [facility] cardiology      Reason for consultation:  · NSTEMI    Requesting provider: Foreign Garza DO  Consulting provider: Uvaldo Camejo MD    IDENTIFICATION: 55-year-old female who resides in [place], [place]      Active Hospital Problems    Diagnosis  POA   • **NSTEMI (non-ST elevated myocardial infarction) (CMS/[facility]) [I21.4]  Yes     Priority: High         Subjective     55-year-old female with a history of \"heart attack\" 12 years ago following her mother's death.  The patient states that no stents were placed at that time.  She was cared for at [facility] and there are no documents available for review.    Earlier tonight, there was an argument among family members at her house.  She developed severe chest pain symptoms.  She presented to the [facility] emergency room.  EKG was unremarkable but initial troponin was 0.424.  Second troponin measured 1.68.  Despite heparin infusion and multiple sublingual nitroglycerin, the patient continued to have chest pain in the emergency room.  Urgent cardiac catheterization has been recommended.    Allergies   Allergen Reactions   • Metoprolol Delirium   • Flagyl [Metronidazole] Nausea And Vomiting       Prior to Admission medications    Medication Sig Start Date End Date Taking? Authorizing Provider   acetaminophen (Tylenol 8 Hour) 650 MG 8 hr tablet Take 1 " tablet by mouth Every 8 (Eight) Hours As Needed for Mild Pain . 3/28/21   Rodolfo Espinoza PA-C   aspirin 81 MG EC tablet Take 81 mg by mouth Daily. On hold this week.    Bj Fish MD   bisacodyl (Dulcolax) 5 MG EC tablet Take 2 at 3pm and 2 at 7pm day prior to colonoscopy. 5/11/21   Lynn Durham MD   cetirizine (zyrTEC) 10 MG tablet Take 10 mg by mouth Daily.    Bj Fish MD   Cholecalciferol (VITAMIN D) 2000 UNITS tablet Take 2,000 Units by mouth Daily.    Bj Fish MD   diazePAM (VALIUM) 5 MG tablet Take 5 mg by mouth Daily As Needed. 9/2/20   Bj Fish MD   estradiol (ESTRACE) 0.1 MG/GM vaginal cream Insert 2 g into the vagina 3 (Three) Times a Week. 5/24/21   Tong Sevilla MD   lisinopril (PRINIVIL,ZESTRIL) 10 MG tablet Take 10 mg by mouth Daily. 12/27/16   Bj Fish MD   meclizine (ANTIVERT) 25 MG tablet Take 1 tablet by mouth 4 (Four) Times a Day As Needed for dizziness. 3/30/19   Nando Braswell DO   naproxen (NAPROSYN) 375 MG tablet Take 1 tablet by mouth 2 (Two) Times a Day As Needed for Mild Pain . 3/28/21   Rodolfo Espinoza PA-C   polyethylene glycol (MIRALAX) 17 g packet Take 17 g by mouth Daily. 5/24/21   Tong Sevilla MD   pravastatin (PRAVACHOL) 40 MG tablet Take 40 mg by mouth daily.    Bj Fish MD   VITAMIN D PO Take 2 tablets by mouth Daily.    Bj Fish MD       Past Medical History:   Diagnosis Date   • Back pain    • Cataract 05/20/2021   • Elevated cholesterol    • Frequent headaches    • Hyperlipidemia    • Hypertension    • PONV (postoperative nausea and vomiting)        Past Surgical History:   Procedure Laterality Date   • CATARACT EXTRACTION WITH INTRAOCULAR LENS IMPLANT Left 05/20/2021   • CHOLECYSTECTOMY     • COLONOSCOPY     • COLONOSCOPY N/A 5/21/2021    Procedure: COLONOSCOPY;  Surgeon: Lynn Durham MD;  Location: [facility] ENDOSCOPY;  Service: Gastroenterology;   Laterality: N/A;   • EYE SURGERY     • HYSTERECTOMY  2012       Family History   Problem Relation Age of Onset   • Heart disease Mother    • Diabetes Mother    • Breast cancer Mother    • Heart disease Father    • Diabetes Father    • Colon cancer Neg Hx    • Esophageal cancer Neg Hx    • Liver cancer Neg Hx    • Liver disease Neg Hx    • Stomach cancer Neg Hx        Social History     Tobacco Use   Smoking Status Never Smoker   Smokeless Tobacco Never Used       Social History     Substance and Sexual Activity   Alcohol Use No         Review of Systems:   Review of Systems   Cardiovascular: Positive for chest pain.       Objective     Vital Sign Min/Max for last 24 hours  Temp  Min: 97.9 °F (36.6 °C)  Max: 97.9 °F (36.6 °C)   BP  Min: 92/61  Max: 126/78   Pulse  Min: 75  Max: 90   Resp  Min: 16  Max: 16   SpO2  Min: 95 %  Max: 98 %   No data recorded    No intake or output data in the 24 hours ending 06/18/21 2350          Constitutional:       Appearance: Healthy appearance. Well-developed.   Eyes:      General: No scleral icterus.  HENT:      Head: Normocephalic and atraumatic.   Neck:      Vascular: No carotid bruit or JVD. JVD normal.   Pulmonary:      Effort: Pulmonary effort is normal.      Breath sounds: Normal breath sounds.   Cardiovascular:      Normal rate. Regular rhythm.      Murmurs: There is no murmur.      No gallop.   Musculoskeletal:      Extremities: No clubbing present.Skin:     General: Skin is warm and dry. There is no cyanosis.   Neurological:      Mental Status: Alert.   Psychiatric:         Attention and Perception: Attention normal.         Behavior: Behavior normal.          Tele: Sinus    DATA REVIEW:      CXR (6/18/2021): No acute process          Results from last 7 days   Lab Units 06/18/21 1959   SODIUM mmol/L 140   POTASSIUM mmol/L 3.9   CHLORIDE mmol/L 106   BUN mg/dL 14   CREATININE mg/dL 0.73     Results from last 7 days   Lab Units 06/18/21  2221 06/18/21 1959   TROPONIN T  ng/mL 1.680* 0.424*     Results from last 7 days   Lab Units 06/18/21 1959   WBC 10*3/mm3 11.37*   HEMOGLOBIN g/dL 14.4   HEMATOCRIT % 43.1   PLATELETS 10*3/mm3 232     No results found for: HGBA1C     Lab Results   Component Value Date    TRIG 146 05/25/2016    HDL 57 05/25/2016     (H) 05/25/2016    AST 19 06/18/2021    ALT 10 06/18/2021           Assessment   NSTEMI  · Increasing troponin with ongoing chest pain despite treatment with heparin and nitroglycerin  · Cardiac catheterization recommended for NSTEMI with moderate risk GLENNA score          Plan     · Emergent cardiac catheterization via right radial approach  · Further recommendations to follow      Electronically signed by William Camejo IV, MD, 06/18/21, 11:47 PM EDT.        Addendum:    Cardiac catheterization performed via right radial approach.  Coronary arteries were minimally diseased.  LV wall motion abnormality consistent with stress-induced cardiomyopathy.  Estimated LVEF 30%.    Recommendations:  · Discontinue heparin  · Treat HFrEF with long-acting metoprolol, lisinopril  · Treat chest pain with narcotics and benzodiazepine    H. C. Uvaldo Camejo MD FAC, [facility]  Interventional and General Cardiology        Electronically signed by William Camejo IV, MD at 06/19/21 0049

## 2021-06-19 NOTE — PLAN OF CARE
Goal Outcome Evaluation:  Plan of Care Reviewed With: patient        Progress: no change  Patient stressed from family argument about  parents home.  Pain still noted.

## 2021-06-19 NOTE — PROGRESS NOTES
[facility]    PROGRESS NOTE    Name:  Shilpa Vides   Age:  55 y.o.  Sex:  female  :  1965  MRN:  [id number]   Visit Number:  [id number]  Admission Date:  2021  Date Of Service:  21  Primary Care Physician:  Spring Lockett APRN     LOS: 1 day :    Chief Complaint:      Generalized weakness.    Subjective:    Ms. Vides was seen and examined this morning.  She is currently lying down on the bed and is comfortable at rest.  She is complaining of generalized weakness but denies any shortness of breath or leg edema.  Right wrist area of cardiac catheterization looks normal without any bleeding.  Denies any fevers.  No significant overnight events.  She was admitted with stress-induced cardiomyopathy.  She was seen by Dr. Camejo this morning.  Cardiac catheterization done yesterday did not show any significant coronary artery disease.  Previous physician documentation, laboratory and imaging data have been reviewed.    Review of Systems:     All systems were reviewed and negative except as mentioned in subjective, assessment and plan.    Vital Signs:    Temp:  [97.6 °F (36.4 °C)-98.3 °F (36.8 °C)] 98.3 °F (36.8 °C)  Heart Rate:  [69-94] 94  Resp:  [16-18] 16  BP: ()/(50-78) 89/61    Intake and output:    I/O last 3 completed shifts:  In: 571 [P.O.:120; I.V.:451]  Out: 150 [Urine:150]  I/O this shift:  In: 420 [P.O.:420]  Out: -     Physical Examination:    General Appearance:  Alert and cooperative.  Comfortable at rest.   Head:  Atraumatic and normocephalic.   Eyes: Conjunctivae and sclerae normal, no icterus. No pallor.   Throat: No oral lesions, no thrush, oral mucosa moist.   Neck: Supple, trachea midline, no thyromegaly.   Lungs:   Breath sounds heard bilaterally equally.  No crackles or wheezing. No Pleural rub or bronchial breathing.   Heart:  Normal S1 and S2, no murmur, no gallop, no rub. No JVD.   Abdomen:   Normal bowel sounds, no masses, no  organomegaly. Soft, nontender, nondistended, no rebound tenderness.   Extremities: Supple, no edema, no cyanosis, no clubbing.   Skin: No bleeding or rash.   Neurologic: Alert and oriented x 3. No facial asymmetry. Moves all four limbs. No tremors.      Laboratory results:    Results from last 7 days   Lab Units 06/19/21  0210 06/18/21 1959   SODIUM mmol/L 142 140   POTASSIUM mmol/L 4.5 3.9   CHLORIDE mmol/L 108* 106   CO2 mmol/L 23.2 22.5   BUN mg/dL 14 14   CREATININE mg/dL 0.68 0.73   CALCIUM mg/dL 9.8 9.7   BILIRUBIN mg/dL 0.4 0.2   ALK PHOS U/L 61 67   ALT (SGPT) U/L 22 10   AST (SGOT) U/L 66* 19   GLUCOSE mg/dL 134* 115*     Results from last 7 days   Lab Units 06/18/21 1959   WBC 10*3/mm3 11.37*   HEMOGLOBIN g/dL 14.4   HEMATOCRIT % 43.1   PLATELETS 10*3/mm3 232     Results from last 7 days   Lab Units 06/18/21 2115   INR  0.88*     Results from last 7 days   Lab Units 06/18/21 2221 06/18/21 1959   TROPONIN T ng/mL 1.680* 0.424*     I have reviewed the patient's laboratory results.    Radiology results:    Cardiac Catheterization/Vascular Study    Result Date: 6/19/2021  · Minimal coronary artery disease · LVEF 30% with wall motion abnormality consistent with stress-induced cardiomyopathy      XR Chest 1 View    Result Date: 6/19/2021  PROCEDURE: XR CHEST 1 VW-  HISTORY: Chest Pain Triage Protocol  COMPARISON: March 30, 2019.  FINDINGS: There is evidence of calcified granulomatous disease. The heart is normal in size. The mediastinum is unremarkable. The lungs are clear. There is no pneumothorax.  There are no acute osseous abnormalities.      Impression: No acute cardiopulmonary process.  Continued followup is recommended.  This report was finalized on 6/19/2021 8:32 AM by Michael Bagley DO.    XR Chest 1 View    Result Date: 6/19/2021  PROCEDURE: XR CHEST 1 VW-  HISTORY: followup; I21.4-Non-ST elevation (NSTEMI) myocardial infarction  COMPARISON: 06/18/2021.  FINDINGS: There is evidence of calcified  granulomatous disease. The heart is normal in size. The mediastinum is unremarkable. The lungs are clear. There is no pneumothorax.  There are no acute osseous abnormalities.      Impression: No acute cardiopulmonary process.  Continued followup is recommended.  This report was finalized on 6/19/2021 8:31 AM by Michael Bagley DO.    I have reviewed the patient's radiology reports.    Medication Review:     I have reviewed the patient's active and prn medications.     Problem List:      Stress-induced cardiomyopathy    Assessment:    1.  Stress-induced cardiomyopathy with ejection fraction of 30%, POA.  2.  Elevated troponin levels secondary to #1, POA.  3.  Dyslipidemia.    Plan:    Ms. Vides is currently doing better without any shortness of breath or chest pain.  She does have stress-induced Takotsubo cardiomyopathy.  She has been seen by Dr. Camejo and has been placed on aspirin, lisinopril and metoprolol.  She will also be continued on pravastatin.    We will get a 2D echocardiogram tomorrow.  She is fairly independent in daily activities and lives with her .  Hopefully, she should be able to go home in the next 1 to 2 days with outpatient follow-up with Dr. Camejo.  Patient may need a LifeVest prior to discharge.    DVT Prophylaxis: SCDs.  Code Status: Full.  Diet: Cardiac.  Discharge Plan: Home in 1 to 2 days.    Mj Blanchard MD  06/19/21  16:17 EDT    Dictated utilizing Dragon dictation.

## 2021-06-19 NOTE — ED PROVIDER NOTES
"Subjective   Chief Complaint: Chest pain, left arm pain  History of Present Illness: 55-year-old female comes in for evaluation of complaints.  She has a history of high cholesterol, hypertension.  She states 20 years ago she had a \"stress-related heart attack\" no stents or angioplasty but states she did have a cath at the time.  She states at that time it was her mother's death that caused the MI.  She states 1 hour prior to arrival she was in an argument with family and had acute onset of substernal chest tightness with radiation of the left arm and lightheadedness.  Her symptoms are ongoing  Onset: 1 hour ago  Timing: Ongoing  Exacerbating / Alleviating factors: Nothing makes better or worse  Associated symptoms: Nausea, dizzy, chest tightness, left arm pain      Nurses Notes reviewed and agree, including vitals, allergies, social history and prior medical history.          Review of Systems    Past Medical History:   Diagnosis Date   • Back pain    • Cataract 05/20/2021   • Elevated cholesterol    • Frequent headaches    • Hyperlipidemia    • Hypertension    • PONV (postoperative nausea and vomiting)        Allergies   Allergen Reactions   • Metoprolol Delirium   • Flagyl [Metronidazole] Nausea And Vomiting       Past Surgical History:   Procedure Laterality Date   • CATARACT EXTRACTION WITH INTRAOCULAR LENS IMPLANT Left 05/20/2021   • CHOLECYSTECTOMY     • COLONOSCOPY     • COLONOSCOPY N/A 5/21/2021    Procedure: COLONOSCOPY;  Surgeon: Lynn Durham MD;  Location: [facility] ENDOSCOPY;  Service: Gastroenterology;  Laterality: N/A;   • EYE SURGERY     • HYSTERECTOMY  2012       Family History   Problem Relation Age of Onset   • Heart disease Mother    • Diabetes Mother    • Breast cancer Mother    • Heart disease Father    • Diabetes Father    • Colon cancer Neg Hx    • Esophageal cancer Neg Hx    • Liver cancer Neg Hx    • Liver disease Neg Hx    • Stomach cancer Neg Hx        Social History     Socioeconomic " History   • Marital status:      Spouse name: Not on file   • Number of children: Not on file   • Years of education: Not on file   • Highest education level: Not on file   Tobacco Use   • Smoking status: Never Smoker   • Smokeless tobacco: Never Used   Vaping Use   • Vaping Use: Never used   Substance and Sexual Activity   • Alcohol use: No   • Drug use: No   • Sexual activity: Defer           Objective   Physical Exam    Procedures           ED Course  ED Course as of Jun 18 2317 Fri Jun 18, 2021 2014 EKG interpreted by me.  Sinus rhythm.  Rate of 71.  First-degree AV block.  ST or T wave abnormalities.  Abnormal EKG    [CG]   2206 Repeat EKG interpreted by me. Sinus rhythm. Rate of 88. Low voltage in the chest leads. No obvious ST or T wave abnormalities. No ST segment depressions or significant elevations. Abnormal EKG    [CG]      ED Course User Index  [CG] Kj Garza, DO                                           [facility]  2050  Spoke with Dr. Camejo, cardiology, request aspirin and heparin bolus and drip and admit to the hospitalist service with echo if possible, trend troponins and he will cath tomorrow if needed.    2214  Patient states pain 7 out of 10 after for morphine and 2 nitroglycerin.  Repeat EKG reviewed with Dr. Garza no ischemic changes.  Blood pressure 95/64.  Updated Dr. Frye.  He wishes for me to speak with Dr. Camejo, cardiology again.  Paged Dr. Camejo, voicemail left awaiting callback.    2258  Repeat troponin 1.6.  Patient is laughing pain.  Cardiologist currently in the middle of a cardiac cath.  I did make the house supervisor aware of the need to talk to him ASAP.    2316  Dr. Camejo will take patient to Cath Lab house supervisor and patient's nurse aware  Final diagnoses:   NSTEMI (non-ST elevated myocardial infarction) (CMS/[facility])       ED Disposition  ED Disposition     ED Disposition Condition Comment    Decision to Admit  Level of Care: Telemetry [5]    Diagnosis: NSTEMI (non-ST elevated myocardial infarction) (CMS/[facility]) [[id number]]   Admitting Physician: BANDAR WRIGHT [[id number]]   Attending Physician: BANDAR WRIGHT [[id number]]   Isolate for COVID?: No [0]   Certification: I Certify That Inpatient Hospital Services Are Medically Necessary For Greater Than 2 Midnights            No follow-up provider specified.       Medication List      No changes were made to your prescriptions during this visit.          Melvin Harden PA-C  06/18/21 3905

## 2021-06-20 ENCOUNTER — APPOINTMENT (OUTPATIENT)
Dept: CARDIOLOGY | Facility: HOSPITAL | Age: 56
End: 2021-06-20

## 2021-06-20 ENCOUNTER — APPOINTMENT (OUTPATIENT)
Dept: GENERAL RADIOLOGY | Facility: HOSPITAL | Age: 56
End: 2021-06-20

## 2021-06-20 LAB
ANION GAP SERPL CALCULATED.3IONS-SCNC: 9.3 MMOL/L (ref 5–15)
BH CV ECHO MEAS - % IVS THICK: 17.9 %
BH CV ECHO MEAS - % LVPW THICK: 15.7 %
BH CV ECHO MEAS - AO MAX PG (FULL): 4.2 MMHG
BH CV ECHO MEAS - AO MAX PG: 6 MMHG
BH CV ECHO MEAS - AO MEAN PG (FULL): 2 MMHG
BH CV ECHO MEAS - AO MEAN PG: 3 MMHG
BH CV ECHO MEAS - AO ROOT AREA (BSA CORRECTED): 1.4
BH CV ECHO MEAS - AO ROOT AREA: 3.9 CM^2
BH CV ECHO MEAS - AO ROOT DIAM: 2.2 CM
BH CV ECHO MEAS - AO V2 MAX: 122 CM/SEC
BH CV ECHO MEAS - AO V2 MEAN: 80 CM/SEC
BH CV ECHO MEAS - AO V2 VTI: 20.5 CM
BH CV ECHO MEAS - AVA(I,A): 1.7 CM^2
BH CV ECHO MEAS - AVA(I,D): 1.7 CM^2
BH CV ECHO MEAS - AVA(V,A): 1.5 CM^2
BH CV ECHO MEAS - AVA(V,D): 1.5 CM^2
BH CV ECHO MEAS - BSA(HAYCOCK): 1.6 M^2
BH CV ECHO MEAS - BSA: 1.6 M^2
BH CV ECHO MEAS - BZI_BMI: 20.5 KILOGRAMS/M^2
BH CV ECHO MEAS - BZI_METRIC_HEIGHT: 167.6 CM
BH CV ECHO MEAS - BZI_METRIC_WEIGHT: 57.6 KG
BH CV ECHO MEAS - EDV(CUBED): 76.8 ML
BH CV ECHO MEAS - EDV(MOD-SP2): 77.3 ML
BH CV ECHO MEAS - EDV(MOD-SP4): 81.6 ML
BH CV ECHO MEAS - EDV(TEICH): 80.8 ML
BH CV ECHO MEAS - EF(CUBED): 74.6 %
BH CV ECHO MEAS - EF(MOD-BP): 36.9 %
BH CV ECHO MEAS - EF(MOD-SP2): 36.7 %
BH CV ECHO MEAS - EF(MOD-SP4): 36.4 %
BH CV ECHO MEAS - EF(TEICH): 66.9 %
BH CV ECHO MEAS - ESV(CUBED): 19.5 ML
BH CV ECHO MEAS - ESV(MOD-SP2): 48.9 ML
BH CV ECHO MEAS - ESV(MOD-SP4): 51.9 ML
BH CV ECHO MEAS - ESV(TEICH): 26.8 ML
BH CV ECHO MEAS - FS: 36.7 %
BH CV ECHO MEAS - IVS/LVPW: 1
BH CV ECHO MEAS - IVSD: 1.1 CM
BH CV ECHO MEAS - IVSS: 1.3 CM
BH CV ECHO MEAS - LA DIMENSION: 2.7 CM
BH CV ECHO MEAS - LA/AO: 1.2
BH CV ECHO MEAS - LAD MAJOR: 4.5 CM
BH CV ECHO MEAS - LAT PEAK E' VEL: 6.6 CM/SEC
BH CV ECHO MEAS - LATERAL E/E' RATIO: 14.8
BH CV ECHO MEAS - LV DIASTOLIC VOL/BSA (35-75): 49.5 ML/M^2
BH CV ECHO MEAS - LV MASS(C)D: 147.8 GRAMS
BH CV ECHO MEAS - LV MASS(C)DI: 89.6 GRAMS/M^2
BH CV ECHO MEAS - LV MASS(C)S: 96.1 GRAMS
BH CV ECHO MEAS - LV MASS(C)SI: 58.3 GRAMS/M^2
BH CV ECHO MEAS - LV MAX PG: 1.8 MMHG
BH CV ECHO MEAS - LV MEAN PG: 1 MMHG
BH CV ECHO MEAS - LV SYSTOLIC VOL/BSA (12-30): 31.5 ML/M^2
BH CV ECHO MEAS - LV V1 MAX: 68 CM/SEC
BH CV ECHO MEAS - LV V1 MEAN: 47.9 CM/SEC
BH CV ECHO MEAS - LV V1 VTI: 12.9 CM
BH CV ECHO MEAS - LVIDD: 4.3 CM
BH CV ECHO MEAS - LVIDS: 2.7 CM
BH CV ECHO MEAS - LVLD AP2: 7.1 CM
BH CV ECHO MEAS - LVLD AP4: 6.4 CM
BH CV ECHO MEAS - LVLS AP2: 6.3 CM
BH CV ECHO MEAS - LVLS AP4: 5.7 CM
BH CV ECHO MEAS - LVOT AREA (M): 2.7 CM^2
BH CV ECHO MEAS - LVOT AREA: 2.7 CM^2
BH CV ECHO MEAS - LVOT DIAM: 1.9 CM
BH CV ECHO MEAS - LVPWD: 1 CM
BH CV ECHO MEAS - LVPWS: 1.2 CM
BH CV ECHO MEAS - MED PEAK E' VEL: 9.6 CM/SEC
BH CV ECHO MEAS - MEDIAL E/E' RATIO: 10.3
BH CV ECHO MEAS - MV A MAX VEL: 71.7 CM/SEC
BH CV ECHO MEAS - MV DEC TIME: 0.15 SEC
BH CV ECHO MEAS - MV E MAX VEL: 98.1 CM/SEC
BH CV ECHO MEAS - MV E/A: 1.4
BH CV ECHO MEAS - MV MAX PG: 6.1 MMHG
BH CV ECHO MEAS - MV MEAN PG: 2 MMHG
BH CV ECHO MEAS - MV V2 MAX: 123 CM/SEC
BH CV ECHO MEAS - MV V2 MEAN: 66.7 CM/SEC
BH CV ECHO MEAS - MV V2 VTI: 19.6 CM
BH CV ECHO MEAS - MVA(VTI): 1.8 CM^2
BH CV ECHO MEAS - PA ACC TIME: 0.13 SEC
BH CV ECHO MEAS - PA MAX PG (FULL): 1.4 MMHG
BH CV ECHO MEAS - PA MAX PG: 2.7 MMHG
BH CV ECHO MEAS - PA MEAN PG (FULL): 1 MMHG
BH CV ECHO MEAS - PA MEAN PG: 2 MMHG
BH CV ECHO MEAS - PA PR(ACCEL): 20.5 MMHG
BH CV ECHO MEAS - PA V2 MAX: 81.8 CM/SEC
BH CV ECHO MEAS - PA V2 MEAN: 58.7 CM/SEC
BH CV ECHO MEAS - PA V2 VTI: 13.2 CM
BH CV ECHO MEAS - PI END-D VEL: 78.7 CM/SEC
BH CV ECHO MEAS - RAP SYSTOLE: 3 MMHG
BH CV ECHO MEAS - RV MAX PG: 1.2 MMHG
BH CV ECHO MEAS - RV MEAN PG: 1 MMHG
BH CV ECHO MEAS - RV V1 MAX: 55.4 CM/SEC
BH CV ECHO MEAS - RV V1 MEAN: 39.2 CM/SEC
BH CV ECHO MEAS - RV V1 VTI: 12.5 CM
BH CV ECHO MEAS - SI(AO): 48.6 ML/M^2
BH CV ECHO MEAS - SI(CUBED): 34.7 ML/M^2
BH CV ECHO MEAS - SI(LVOT): 21 ML/M^2
BH CV ECHO MEAS - SI(MOD-SP2): 17.2 ML/M^2
BH CV ECHO MEAS - SI(MOD-SP4): 18 ML/M^2
BH CV ECHO MEAS - SI(TEICH): 32.8 ML/M^2
BH CV ECHO MEAS - SV(AO): 80.1 ML
BH CV ECHO MEAS - SV(CUBED): 57.3 ML
BH CV ECHO MEAS - SV(LVOT): 34.7 ML
BH CV ECHO MEAS - SV(MOD-SP2): 28.4 ML
BH CV ECHO MEAS - SV(MOD-SP4): 29.7 ML
BH CV ECHO MEAS - SV(TEICH): 54 ML
BH CV ECHO MEAS - TAPSE (>1.6): 1.8 CM
BH CV ECHO MEASUREMENTS AVERAGE E/E' RATIO: 12.11
BH CV XLRA - RV BASE: 2.5 CM
BH CV XLRA - RV LENGTH: 4.6 CM
BH CV XLRA - RV MID: 1.8 CM
BH CV XLRA - TDI S': 10.2 CM/SEC
BUN SERPL-MCNC: 16 MG/DL (ref 6–20)
BUN/CREAT SERPL: 22.2 (ref 7–25)
CALCIUM SPEC-SCNC: 8.6 MG/DL (ref 8.6–10.5)
CHLORIDE SERPL-SCNC: 109 MMOL/L (ref 98–107)
CHOLEST SERPL-MCNC: 149 MG/DL (ref 0–200)
CO2 SERPL-SCNC: 22.7 MMOL/L (ref 22–29)
CREAT SERPL-MCNC: 0.72 MG/DL (ref 0.57–1)
DEPRECATED RDW RBC AUTO: 44 FL (ref 37–54)
ERYTHROCYTE [DISTWIDTH] IN BLOOD BY AUTOMATED COUNT: 12.8 % (ref 12.3–15.4)
GFR SERPL CREATININE-BSD FRML MDRD: 84 ML/MIN/1.73
GLUCOSE SERPL-MCNC: 89 MG/DL (ref 65–99)
HCT VFR BLD AUTO: 38.8 % (ref 34–46.6)
HDLC SERPL-MCNC: 61 MG/DL (ref 40–60)
HGB BLD-MCNC: 12.4 G/DL (ref 12–15.9)
LDLC SERPL CALC-MCNC: 72 MG/DL (ref 0–100)
LDLC/HDLC SERPL: 1.16 {RATIO}
LEFT ATRIUM VOLUME INDEX: 20.5 ML/M^2
LEFT ATRIUM VOLUME: 33.8 ML
LV EF 2D ECHO EST: 30 %
MAXIMAL PREDICTED HEART RATE: 164 BPM
MCH RBC QN AUTO: 30.1 PG (ref 26.6–33)
MCHC RBC AUTO-ENTMCNC: 32 G/DL (ref 31.5–35.7)
MCV RBC AUTO: 94.2 FL (ref 79–97)
PLATELET # BLD AUTO: 198 10*3/MM3 (ref 140–450)
PMV BLD AUTO: 9.7 FL (ref 6–12)
POTASSIUM SERPL-SCNC: 3.9 MMOL/L (ref 3.5–5.2)
RBC # BLD AUTO: 4.12 10*6/MM3 (ref 3.77–5.28)
SODIUM SERPL-SCNC: 141 MMOL/L (ref 136–145)
STRESS TARGET HR: 139 BPM
TRIGL SERPL-MCNC: 86 MG/DL (ref 0–150)
VLDLC SERPL-MCNC: 16 MG/DL (ref 5–40)
WBC # BLD AUTO: 7.24 10*3/MM3 (ref 3.4–10.8)

## 2021-06-20 PROCEDURE — 93306 TTE W/DOPPLER COMPLETE: CPT | Performed by: INTERNAL MEDICINE

## 2021-06-20 PROCEDURE — 80061 LIPID PANEL: CPT | Performed by: INTERNAL MEDICINE

## 2021-06-20 PROCEDURE — 63710000001 ONDANSETRON PER 8 MG: Performed by: INTERNAL MEDICINE

## 2021-06-20 PROCEDURE — 71045 X-RAY EXAM CHEST 1 VIEW: CPT

## 2021-06-20 PROCEDURE — 80048 BASIC METABOLIC PNL TOTAL CA: CPT | Performed by: INTERNAL MEDICINE

## 2021-06-20 PROCEDURE — 99232 SBSQ HOSP IP/OBS MODERATE 35: CPT | Performed by: INTERNAL MEDICINE

## 2021-06-20 PROCEDURE — 93306 TTE W/DOPPLER COMPLETE: CPT

## 2021-06-20 PROCEDURE — 85027 COMPLETE CBC AUTOMATED: CPT | Performed by: INTERNAL MEDICINE

## 2021-06-20 RX ORDER — LISINOPRIL 5 MG/1
5 TABLET ORAL DAILY
Status: DISCONTINUED | OUTPATIENT
Start: 2021-06-21 | End: 2021-06-21 | Stop reason: HOSPADM

## 2021-06-20 RX ADMIN — PRAVASTATIN SODIUM 40 MG: 20 TABLET ORAL at 09:16

## 2021-06-20 RX ADMIN — HYDROCODONE BITARTRATE AND ACETAMINOPHEN 1 TABLET: 7.5; 325 TABLET ORAL at 05:43

## 2021-06-20 RX ADMIN — ONDANSETRON HYDROCHLORIDE 4 MG: 4 TABLET, FILM COATED ORAL at 16:41

## 2021-06-20 RX ADMIN — POLYETHYLENE GLYCOL 3350 17 G: 17 POWDER, FOR SOLUTION ORAL at 09:20

## 2021-06-20 RX ADMIN — SODIUM CHLORIDE, PRESERVATIVE FREE 10 ML: 5 INJECTION INTRAVENOUS at 20:23

## 2021-06-20 RX ADMIN — ACETAMINOPHEN 650 MG: 325 TABLET ORAL at 09:19

## 2021-06-20 RX ADMIN — Medication 2000 UNITS: at 09:15

## 2021-06-20 RX ADMIN — SODIUM CHLORIDE, PRESERVATIVE FREE 10 ML: 5 INJECTION INTRAVENOUS at 09:16

## 2021-06-20 RX ADMIN — CETIRIZINE HYDROCHLORIDE 10 MG: 10 TABLET, FILM COATED ORAL at 09:16

## 2021-06-20 RX ADMIN — ASPIRIN 81 MG: 81 TABLET, COATED ORAL at 09:16

## 2021-06-20 NOTE — PROGRESS NOTES
"Cardiology Progress Note      Reason for visit:    · Stress-induced cardiomyopathy    IDENTIFICATION: 55-year-old female from [place], [place]    Active Hospital Problems    Diagnosis  POA   • **Stress-induced cardiomyopathy [I51.81]  Yes     Priority: High     · History of stress-induced cardiomyopathy after mother's deat,h circa 2008  · [facility] ER presentation with acute chest pain after emotional argument and elevated troponin, 6/18/2021  · Cardiac catheterization (6/19/2021): Minimal CAD.  LVEF 30% and pattern of Takotsubo cardiomyopathy.              · Does not feel \"great\" today  · Still has a little heaviness in the chest and general fatigue symptoms  · Blood pressure has been running low           Vital Sign Min/Max for last 24 hours  Temp  Min: 97.9 °F (36.6 °C)  Max: 98.8 °F (37.1 °C)   BP  Min: 87/51  Max: 102/59   Pulse  Min: 73  Max: 94   Resp  Min: 16  Max: 18   SpO2  Min: 93 %  Max: 96 %   No data recorded      Intake/Output Summary (Last 24 hours) at 6/20/2021 1449  Last data filed at 6/20/2021 1300  Gross per 24 hour   Intake 840 ml   Output --   Net 840 ml           Physical Exam  Constitutional:       Appearance: Normal appearance.   HENT:      Head: Normocephalic.   Cardiovascular:      Rate and Rhythm: Normal rate and regular rhythm.      Heart sounds: No murmur heard.     Pulmonary:      Effort: Pulmonary effort is normal.      Breath sounds: No wheezing or rhonchi.   Neurological:      Mental Status: She is alert.         Tele: Sinus    Results Review (reviewed the patient's recent labs in the electronic medical record):     EKG (3/19/2021): Normal    ECHO (/20/2021): LVEF 30%.  Wall motion normality consistent with Takotsubo.  No significant valvular disease    Result Review:      Results from last 7 days   Lab Units 06/20/21  0631 06/19/21  0210 06/18/21  1959   SODIUM mmol/L 141 142 140   POTASSIUM mmol/L 3.9 4.5 3.9   CHLORIDE mmol/L 109* 108* 106   BUN mg/dL 16 14 14   CREATININE mg/dL 0.72 " 0.68 0.73     Results from last 7 days   Lab Units 06/18/21  2221 06/18/21 1959   TROPONIN T ng/mL 1.680* 0.424*     Results from last 7 days   Lab Units 06/20/21  0631 06/18/21 1959   WBC 10*3/mm3 7.24 11.37*   HEMOGLOBIN g/dL 12.4 14.4   HEMATOCRIT % 38.8 43.1   PLATELETS 10*3/mm3 198 232       Lab Results   Component Value Date    HGBA1C 4.90 06/18/2021       Lab Results   Component Value Date    CHOL 149 06/20/2021    TRIG 86 06/20/2021    HDL 61 (H) 06/20/2021    LDL 72 06/20/2021    AST 66 (H) 06/19/2021    ALT 22 06/19/2021              Stress-induced cardiomyopathy  · Continue metoprolol succinate and lisinopril  · LVEF 30%.  LifeVest has been ordered.  I have contacted [employer] representative (Spring Wang [phone])             · Keep in hospital today  · Reduce lisinopril dose to 5 mg daily due to low blood pressure  · Continue metoprolol succinate  · LifeVest ordered  · Follow-up with me in my [facility] in 4 to 6 weeks with limited echo    Electronically signed by William Camejo IV, MD, 06/19/21, 1:56 PM EDT.

## 2021-06-20 NOTE — PROGRESS NOTES
"    [facility]    PROGRESS NOTE    Name:  Shilpa Vides   Age:  55 y.o.  Sex:  female  :  1965  MRN:  [id number]   Visit Number:  [id number]  Admission Date:  2021  Date Of Service:  21  Primary Care Physician:  Spring Lockett APRN     LOS: 2 days :    Chief Complaint:      Generalized weakness.    Subjective:    Ms. Vides was seen and examined this morning.  She is currently lying down on the bed and is comfortable at rest.  She has been walking to the bathroom without any difficulty.  Her blood pressures have been low this morning and she was seen by Dr. Camejo who has decreased her lisinopril dose down to 5 mg daily.  She denies any chest pain, abdominal pain, nausea or vomiting.  No significant overnight events.    Hospital course:    Ms. Vides is a 55-year-old female with history of hypertension and dyslipidemia, prior history of \"stress-related heart attack\" 10 years ago was admitted from the emergency room with chest pain, left arm numbness and shortness of breath.  Patient was noted to have elevated troponin levels without any acute EKG changes.  Due to persistent chest pain, she was taken to the Cath Lab by Dr. Camejo and was noted to have minimal coronary disease without any blockages.  It was felt that the patient likely has stress-induced cardiomyopathy.  Patient was admitted to the medical floor with telemetry and was placed on appropriate guideline directed therapy for heart failure with metoprolol and lisinopril.  She was also continued on aspirin and pravastatin.  A 2D echocardiogram has been ordered.    Review of Systems:     All systems were reviewed and negative except as mentioned in subjective, assessment and plan.    Vital Signs:    Temp:  [97.6 °F (36.4 °C)-98.8 °F (37.1 °C)] 97.6 °F (36.4 °C)  Heart Rate:  [73-97] 79  Resp:  [16-18] 18  BP: ()/(51-62) 111/62    Intake and output:    I/O last 3 completed shifts:  In: 1351 [P.O.:900; " I.V.:451]  Out: 150 [Urine:150]  I/O this shift:  In: 480 [P.O.:480]  Out: -     Physical Examination:    General Appearance:  Alert and cooperative. Comfortable at rest.   Head:  Atraumatic and normocephalic.   Eyes: Conjunctivae and sclerae normal, no icterus. No pallor.   Throat: No oral lesions, no thrush, oral mucosa moist.   Neck: Supple, trachea midline, no thyromegaly.   Lungs:   Breath sounds heard bilaterally equally.  No crackles or wheezing. No Pleural rub or bronchial breathing.   Heart:  Normal S1 and S2, no murmur, no gallop, no rub. No JVD.   Abdomen:   Normal bowel sounds, no masses, no organomegaly. Soft, nontender, nondistended, no rebound tenderness.   Extremities: Supple, no edema, no cyanosis, no clubbing.   Skin: No bleeding or rash.   Neurologic: Alert and oriented x 3. No facial asymmetry. Moves all four limbs. No tremors.      Laboratory results:    Results from last 7 days   Lab Units 06/20/21  0631 06/19/21  0210 06/18/21 1959   SODIUM mmol/L 141 142 140   POTASSIUM mmol/L 3.9 4.5 3.9   CHLORIDE mmol/L 109* 108* 106   CO2 mmol/L 22.7 23.2 22.5   BUN mg/dL 16 14 14   CREATININE mg/dL 0.72 0.68 0.73   CALCIUM mg/dL 8.6 9.8 9.7   BILIRUBIN mg/dL  --  0.4 0.2   ALK PHOS U/L  --  61 67   ALT (SGPT) U/L  --  22 10   AST (SGOT) U/L  --  66* 19   GLUCOSE mg/dL 89 134* 115*     Results from last 7 days   Lab Units 06/20/21  0631 06/18/21 1959   WBC 10*3/mm3 7.24 11.37*   HEMOGLOBIN g/dL 12.4 14.4   HEMATOCRIT % 38.8 43.1   PLATELETS 10*3/mm3 198 232     Results from last 7 days   Lab Units 06/18/21 2115   INR  0.88*     Results from last 7 days   Lab Units 06/18/21 2221 06/18/21 1959   TROPONIN T ng/mL 1.680* 0.424*     I have reviewed the patient's laboratory results.    Radiology results:    Adult Transthoracic Echo Complete W/ Cont if Necessary Per Protocol    Result Date: 6/20/2021  · Left ventricular systolic function is moderately decreased. Estimated left ventricular EF = 30%. Wall  motion abnormality consistent with stress-induced (Takotsubo) cardiomyopathy. · The cardiac valves are anatomically and functionally normal.      Cardiac Catheterization/Vascular Study    Result Date: 6/19/2021  · Minimal coronary artery disease · LVEF 30% with wall motion abnormality consistent with stress-induced cardiomyopathy      XR Chest 1 View    Result Date: 6/20/2021  PROCEDURE: XR CHEST 1 VW-  HISTORY: followup; I21.4-Non-ST elevation (NSTEMI) myocardial infarction  COMPARISON: 06/19/2021.  FINDINGS: The heart is normal in size. The mediastinum is unremarkable. Minimal basilar atelectasis. There is no pneumothorax.  There are no acute osseous abnormalities.      Impression: Minimal basilar atelectasis.  Continued followup is recommended.  This report was finalized on 6/20/2021 8:18 AM by Michael Bagley DO.    XR Chest 1 View    Result Date: 6/19/2021  PROCEDURE: XR CHEST 1 VW-  HISTORY: Chest Pain Triage Protocol  COMPARISON: March 30, 2019.  FINDINGS: There is evidence of calcified granulomatous disease. The heart is normal in size. The mediastinum is unremarkable. The lungs are clear. There is no pneumothorax.  There are no acute osseous abnormalities.      Impression: No acute cardiopulmonary process.  Continued followup is recommended.  This report was finalized on 6/19/2021 8:32 AM by Michael Bagley DO.    XR Chest 1 View    Result Date: 6/19/2021  PROCEDURE: XR CHEST 1 VW-  HISTORY: followup; I21.4-Non-ST elevation (NSTEMI) myocardial infarction  COMPARISON: 06/18/2021.  FINDINGS: There is evidence of calcified granulomatous disease. The heart is normal in size. The mediastinum is unremarkable. The lungs are clear. There is no pneumothorax.  There are no acute osseous abnormalities.      Impression: No acute cardiopulmonary process.  Continued followup is recommended.  This report was finalized on 6/19/2021 8:31 AM by Michael Bagley DO.    I have reviewed the patient's radiology reports.    Medication  Review:     I have reviewed the patient's active and prn medications.     Problem List:      Stress-induced cardiomyopathy    Assessment:    1.  Stress-induced cardiomyopathy with ejection fraction of 30%, POA.  2.  Elevated troponin levels secondary to #1, POA.  3.  Dyslipidemia.    Plan:    Ms. Vides is currently doing better without any shortness of breath or chest pain.  She does have stress-induced Takotsubo cardiomyopathy.  She has been seen by Dr. Camejo and has been placed on aspirin, lisinopril and metoprolol.  She will also be continued on pravastatin.    Due to low blood pressures, her lisinopril dose has been decreased by Dr. Camejo today.    We will get a 2D echocardiogram tomorrow.  She is fairly independent in daily activities and lives with her .  Hopefully, she should be able to go home tomorrow with outpatient follow-up with Dr. Camejo.  Dr. Camejo has ordered LifeVest at discharge.  Discussed with nursing staff.    DVT Prophylaxis: SCDs.  Code Status: Full.  Diet: Cardiac.  Discharge Plan: Home tomorrow.    Mj Blanchard MD  06/20/21  17:28 EDT    Dictated utilizing Dragon dictation.

## 2021-06-20 NOTE — PLAN OF CARE
Goal Outcome Evaluation:  Plan of Care Reviewed With: patient        Progress: improving  Outcome Summary: VSS.  Pt c/o pain that was controlled with PRN meds.  No c/o chest pain.  No other changes in pt condition to report.  Will continue to monitor.

## 2021-06-21 VITALS
TEMPERATURE: 97.9 F | DIASTOLIC BLOOD PRESSURE: 62 MMHG | HEART RATE: 83 BPM | RESPIRATION RATE: 18 BRPM | HEIGHT: 66 IN | WEIGHT: 127.87 LBS | SYSTOLIC BLOOD PRESSURE: 98 MMHG | BODY MASS INDEX: 20.55 KG/M2 | OXYGEN SATURATION: 97 %

## 2021-06-21 LAB
QT INTERVAL: 430 MS
QTC INTERVAL: 464 MS

## 2021-06-21 PROCEDURE — 25010000002 ONDANSETRON PER 1 MG: Performed by: INTERNAL MEDICINE

## 2021-06-21 PROCEDURE — 99239 HOSP IP/OBS DSCHRG MGMT >30: CPT | Performed by: INTERNAL MEDICINE

## 2021-06-21 RX ORDER — LISINOPRIL 2.5 MG/1
2.5 TABLET ORAL DAILY
Qty: 30 TABLET | Refills: 0 | Status: SHIPPED | OUTPATIENT
Start: 2021-06-21 | End: 2021-07-21

## 2021-06-21 RX ORDER — METOPROLOL SUCCINATE 25 MG/1
12.5 TABLET, EXTENDED RELEASE ORAL
Status: DISCONTINUED | OUTPATIENT
Start: 2021-06-22 | End: 2021-06-21 | Stop reason: HOSPADM

## 2021-06-21 RX ORDER — METOPROLOL SUCCINATE 25 MG/1
12.5 TABLET, EXTENDED RELEASE ORAL
Qty: 15 TABLET | Refills: 0 | Status: SHIPPED | OUTPATIENT
Start: 2021-06-22 | End: 2021-07-22

## 2021-06-21 RX ADMIN — POLYETHYLENE GLYCOL 3350 17 G: 17 POWDER, FOR SOLUTION ORAL at 08:44

## 2021-06-21 RX ADMIN — Medication 2000 UNITS: at 08:46

## 2021-06-21 RX ADMIN — CETIRIZINE HYDROCHLORIDE 10 MG: 10 TABLET, FILM COATED ORAL at 08:46

## 2021-06-21 RX ADMIN — SODIUM CHLORIDE, PRESERVATIVE FREE 10 ML: 5 INJECTION INTRAVENOUS at 08:44

## 2021-06-21 RX ADMIN — HYDROCODONE BITARTRATE AND ACETAMINOPHEN 1 TABLET: 7.5; 325 TABLET ORAL at 08:47

## 2021-06-21 RX ADMIN — PRAVASTATIN SODIUM 40 MG: 20 TABLET ORAL at 08:44

## 2021-06-21 RX ADMIN — HYDROCODONE BITARTRATE AND ACETAMINOPHEN 1 TABLET: 7.5; 325 TABLET ORAL at 00:30

## 2021-06-21 RX ADMIN — ASPIRIN 81 MG: 81 TABLET, COATED ORAL at 08:44

## 2021-06-21 RX ADMIN — ONDANSETRON 4 MG: 2 INJECTION INTRAMUSCULAR; INTRAVENOUS at 11:26

## 2021-06-21 NOTE — PLAN OF CARE
Goal Outcome Evaluation:  Plan of Care Reviewed With: patient        Progress: no change  Outcome Summary: No acute events during the night. Vital Signs Stable. Complaints of pain (headache), see MAR for medication administration. Patient rested well. Possible D/C today once life vest arranged.

## 2021-06-21 NOTE — PROGRESS NOTES
Continued Stay Note   Camejo     Patient Name: Shilpa Vides  MRN: [id number]  Today's Date: 6/21/2021    Admit Date: 6/18/2021    Discharge Plan     Row Name 06/21/21 1444       Plan    Plan  Spoke to pt in room.  Plans to go home at discharge.  Denies DC needs.        Discharge Codes    No documentation.       Expected Discharge Date and Time     Expected Discharge Date Expected Discharge Time    Jun 22, 2021             Gaby Knott RN

## 2021-06-21 NOTE — DISCHARGE SUMMARY
"    [facility]      Name:  Shilpa Vides   Age:  55 y.o.  Sex:  female  :  1965  MRN:  [id number]   Visit Number:  [id number]    Admission Date:  2021  Date of Discharge:  2021  Primary Care Physician:  Spring Lockett, APRN  Discharge Diagnoses:       Stress-induced cardiomyopathy      Presenting Problem:    NSTEMI (non-ST elevated myocardial infarction) (CMS/[facility]) [I21.4]     Consults:     Consults     No orders found from 2021 to 2021.        Consulting Physician(s)             None            Procedures Performed:    Procedure(s):  CORONARY ANGIOGRAPHY       Hospital Course:    Ms. Vides is a 55-year-old female with history of hypertension and dyslipidemia, prior history of \"stress-related heart attack\" 10 years ago was admitted from the emergency room with chest pain, left arm numbness and shortness of breath.  Patient was noted to have elevated troponin levels without any acute EKG changes.  Due to persistent chest pain, she was taken to the Cath Lab by Dr. [facility] and was noted to have minimal coronary disease without any blockages.  It was felt that the patient likely has stress-induced cardiomyopathy.  Patient was admitted to the medical floor with telemetry and was placed on appropriate guideline directed therapy for heart failure with metoprolol and lisinopril.  She was also continued on aspirin and pravastatin.  A 2D echocardiogram showed EF 30%. Wall motion abnormality consistent with stress-induced (Takotsubo) cardiomyopathy.  Patient was fitted for LifeVest prior to discharge.  She was started on Toprol-XL as well as lisinopril.  However, lisinopril dose had to be decreased due to hypotension.  Try to maximize as tolerated on outpatient basis.  Follow-up primary physician within 1 to 2 weeks.  Patient has requested to follow-up with Dr. Louis in [place] for cardiology.  Appointment request made for 4 weeks.    Vital " Signs:    Temp:  [97.6 °F (36.4 °C)-98.6 °F (37 °C)] 97.9 °F (36.6 °C)  Heart Rate:  [79-97] 92  Resp:  [18-20] 18  BP: ()/(53-62) 92/61    Physical Exam:    General Appearance:  Alert and cooperative, not in any acute distress.   Head:  Atraumatic and normocephalic, without obvious abnormality.   Eyes:          PERRLA, conjunctivae and sclerae normal, No pallor. Extraocular movements are within normal limits.   Ears:  Ears appear intact with no abnormalities noted.   Throat: No oral lesions, no thrush, oral mucosa moist.           Lungs:   Chest shape is normal. Breath sounds heard bilaterally equally.  No crackles or wheezing.    Heart:  Normal S1 and S2, no murmur, no gallop, no rub. No JVD.   Abdomen:   Normal bowel sounds, Soft, nontender, nondistended, no guarding, no rebound tenderness.   Extremities: Moves all extremities well, no edema, no cyanosis, no clubbing.   Pulses: Pulses palpable and equal bilaterally.   Skin: No bleeding, bruising or rash.       Neurologic: Alert and oriented x 3. Moves all four limbs equally. No tremors. No facial asymmetry.     Pertinent Lab Results:     Results from last 7 days   Lab Units 06/20/21 0631 06/19/21 0210 06/18/21 1959   SODIUM mmol/L 141 142 140   POTASSIUM mmol/L 3.9 4.5 3.9   CHLORIDE mmol/L 109* 108* 106   CO2 mmol/L 22.7 23.2 22.5   BUN mg/dL 16 14 14   CREATININE mg/dL 0.72 0.68 0.73   CALCIUM mg/dL 8.6 9.8 9.7   BILIRUBIN mg/dL  --  0.4 0.2   ALK PHOS U/L  --  61 67   ALT (SGPT) U/L  --  22 10   AST (SGOT) U/L  --  66* 19   GLUCOSE mg/dL 89 134* 115*     Results from last 7 days   Lab Units 06/20/21 0631 06/18/21 1959   WBC 10*3/mm3 7.24 11.37*   HEMOGLOBIN g/dL 12.4 14.4   HEMATOCRIT % 38.8 43.1   PLATELETS 10*3/mm3 198 232     Results from last 7 days   Lab Units 06/18/21  2115   INR  0.88*     Results from last 7 days   Lab Units 06/18/21  2221 06/18/21  1959   TROPONIN T ng/mL 1.680* 0.424*                           Invalid input(s): SASHA,   BLOODU, NITRITITE, BACT, EP  Pain Management Panel    There is no flowsheet data to display.             Pertinent Radiology Results:    Imaging Results (All)     Procedure Component Value Units Date/Time    XR Chest 1 View [[id number]] Collected: 06/20/21 0817     Updated: 06/20/21 0820    Narrative:      PROCEDURE: XR CHEST 1 VW-     HISTORY: followup; I21.4-Non-ST elevation (NSTEMI) myocardial infarction     COMPARISON: 06/19/2021.     FINDINGS: The heart is normal in size. The mediastinum is unremarkable.  Minimal basilar atelectasis. There is no pneumothorax.  There are no  acute osseous abnormalities.       Impression:      Minimal basilar atelectasis.     Continued followup is recommended.     This report was finalized on 6/20/2021 8:18 AM by Michael Bagley DO.    XR Chest 1 View [[id number]] Collected: 06/19/21 0831     Updated: 06/19/21 0834    Narrative:      PROCEDURE: XR CHEST 1 VW-     HISTORY: Chest Pain Triage Protocol     COMPARISON: March 30, 2019.     FINDINGS: There is evidence of calcified granulomatous disease. The  heart is normal in size. The mediastinum is unremarkable. The lungs are  clear. There is no pneumothorax.  There are no acute osseous  abnormalities.       Impression:      No acute cardiopulmonary process.     Continued followup is recommended.     This report was finalized on 6/19/2021 8:32 AM by Michael Bagley DO.    XR Chest 1 View [[id number]] Collected: 06/19/21 0831     Updated: 06/19/21 0833    Narrative:      PROCEDURE: XR CHEST 1 VW-     HISTORY: followup; I21.4-Non-ST elevation (NSTEMI) myocardial infarction     COMPARISON: 06/18/2021.     FINDINGS: There is evidence of calcified granulomatous disease. The  heart is normal in size. The mediastinum is unremarkable. The lungs are  clear. There is no pneumothorax.  There are no acute osseous  abnormalities.       Impression:      No acute cardiopulmonary process.     Continued followup is recommended.     This report was finalized  on 6/19/2021 8:31 AM by Michael Bagley DO.          Echo:    Results for orders placed during the hospital encounter of 06/18/21    Adult Transthoracic Echo Complete W/ Cont if Necessary Per Protocol    Interpretation Summary  · Left ventricular systolic function is moderately decreased. Estimated left ventricular EF = 30%. Wall motion abnormality consistent with stress-induced (Takotsubo) cardiomyopathy.  · The cardiac valves are anatomically and functionally normal.      Condition on Discharge:      Stable.    Code status during the hospital stay:    Code Status and Medical Interventions:   Ordered at: 06/18/21 2153     Level Of Support Discussed With:    Patient     Code Status:    CPR     Medical Interventions (Level of Support Prior to Arrest):    Full       Discharge Disposition:        Discharge Medications:       Discharge Medications      New Medications      Instructions Start Date   metoprolol succinate XL 25 MG 24 hr tablet  Commonly known as: TOPROL-XL   12.5 mg, Oral, Every 24 Hours Scheduled   Start Date: June 22, 2021        Changes to Medications      Instructions Start Date   Dulcolax 5 MG EC tablet  Generic drug: bisacodyl  What changed: additional instructions   Take 2 at 3pm and 2 at 7pm day prior to colonoscopy.      lisinopril 2.5 MG tablet  Commonly known as: Zestril  What changed:   · medication strength  · how much to take   2.5 mg, Oral, Daily         Continue These Medications      Instructions Start Date   acetaminophen 650 MG 8 hr tablet  Commonly known as: Tylenol 8 Hour   650 mg, Oral, Every 8 Hours PRN      aspirin 81 MG EC tablet   81 mg, Oral, Daily, On hold this week.      cetirizine 10 MG tablet  Commonly known as: zyrTEC   10 mg, Oral, Daily      diazePAM 5 MG tablet  Commonly known as: VALIUM   5 mg, Oral, Daily PRN      estradiol 0.1 MG/GM vaginal cream  Commonly known as: ESTRACE   2 g, Vaginal, 3 Times Weekly      meclizine 25 MG tablet  Commonly known as: ANTIVERT   25 mg,  Oral, 4 Times Daily PRN      naproxen 375 MG tablet  Commonly known as: NAPROSYN   375 mg, Oral, 2 Times Daily PRN      pravastatin 40 MG tablet  Commonly known as: PRAVACHOL   40 mg, Oral, Daily      Vitamin D 50 MCG (2000 UT) tablet   2,000 Units, Oral, Daily      VITAMIN D PO   2 tablets, Daily             Discharge Diet:     Diet Instructions     Diet: Cardiac      Discharge Diet: Cardiac          Activity at Discharge:         Follow-up Appointments:    Additional Instructions for the Follow-ups that You Need to Schedule     Discharge Follow-up with PCP   As directed       Currently Documented PCP:    Spring Lockett APRN    PCP Phone Number:    [phone]     Follow Up Details: 1-2 weeks         Discharge Follow-up with Specified Provider: akhil; 1 Month   As directed      To: akhil    Follow Up: 1 Month    Follow Up Details: Patient request-stress cardiomyopathy on LifeVest           Follow-up Information     William [facility] IV, MD Follow up in 6 week(s).    Specialties: Interventional Cardiology, Cardiology  Why: in [facility] with limited echo prior  Contact information:  [address]  [facility] [id number] [phone]             Spring Lockett, RAVI .    Specialty: Nurse Practitioner  Why: 1-2 weeks  Contact information:  [address]  [facility] [address]  [phone]                   Future Appointments   Date Time Provider Department Center   6/22/2021  2:20 PM Primo Pompa MD [facility]   8/26/2021  1:00 PM Tong Sevilla MD [facility]       Additional Instructions for the Follow-ups that You Need to Schedule     Discharge Follow-up with PCP   As directed       Currently Documented PCP:    Spring Lockett APRN    PCP Phone Number:    [phone]     Follow Up Details: 1-2 weeks         Discharge Follow-up with Specified Provider: akhil; 1 Month   As directed      To: akhil    Follow Up: 1 Month    Follow Up Details: Patient  request-stress cardiomyopathy on LifeVest               Test Results Pending at Discharge:           Jay Enciso DO  06/21/21  15:56 EDT    Time spent: Time: I spent  >30  minutes on this discharge activity which included: face-to-face encounter with the patient, reviewing the data in the system, coordination of the care with the nursing staff as well as consultants, documentation, and entering orders.        Dictated utilizing Dragon dictation.

## 2021-06-22 ENCOUNTER — OFFICE VISIT (OUTPATIENT)
Dept: OBSTETRICS AND GYNECOLOGY | Facility: CLINIC | Age: 56
End: 2021-06-22

## 2021-06-22 ENCOUNTER — READMISSION MANAGEMENT (OUTPATIENT)
Dept: CALL CENTER | Facility: HOSPITAL | Age: 56
End: 2021-06-22

## 2021-06-22 VITALS
DIASTOLIC BLOOD PRESSURE: 62 MMHG | HEIGHT: 64 IN | WEIGHT: 129 LBS | BODY MASS INDEX: 22.02 KG/M2 | SYSTOLIC BLOOD PRESSURE: 112 MMHG

## 2021-06-22 DIAGNOSIS — N94.9 VAGINAL DISCOMFORT: Primary | ICD-10-CM

## 2021-06-22 DIAGNOSIS — B37.31 VULVOVAGINAL CANDIDIASIS: ICD-10-CM

## 2021-06-22 DIAGNOSIS — R30.0 DYSURIA: ICD-10-CM

## 2021-06-22 PROCEDURE — 99203 OFFICE O/P NEW LOW 30 MIN: CPT | Performed by: OBSTETRICS & GYNECOLOGY

## 2021-06-22 NOTE — CASE MANAGEMENT/SOCIAL WORK
Case Management Discharge Note           Provided Post Acute Provider List?: N/A  Provided Post Acute Provider Quality & Resource List?: N/A    Selected Continued Care - Discharged on 6/21/2021 Admission date: 6/18/2021 - Discharge disposition: Home or Self Care    Destination    No services have been selected for the patient.              Durable Medical Equipment    No services have been selected for the patient.              Dialysis/Infusion    No services have been selected for the patient.              Home Medical Care    No services have been selected for the patient.              Therapy    No services have been selected for the patient.              Community Resources    No services have been selected for the patient.              Community & DME    No services have been selected for the patient.                       Final Discharge Disposition Code: 01 - home or self-care

## 2021-06-22 NOTE — OUTREACH NOTE
Prep Survey      Responses   [facility] facility patient discharged from?  [facility]   Is LACE score < 7 ?  No   Emergency Room discharge w/ pulse ox?  No   Eligibility  Readm Mgmt   Discharge diagnosis  Stress-induced cardiomyopathy   Does the patient have one of the following disease processes/diagnoses(primary or secondary)?  Other   Is there a DME ordered?  Yes   What DME was ordered?  life vest   Medication alerts for this patient  see avs   Prep survey completed?  Yes          Brenda Luu RN

## 2021-06-23 ENCOUNTER — TELEPHONE (OUTPATIENT)
Dept: CARDIOLOGY | Facility: CLINIC | Age: 56
End: 2021-06-23

## 2021-06-23 ENCOUNTER — READMISSION MANAGEMENT (OUTPATIENT)
Dept: CALL CENTER | Facility: HOSPITAL | Age: 56
End: 2021-06-23

## 2021-06-23 DIAGNOSIS — I51.81 STRESS-INDUCED CARDIOMYOPATHY: Primary | ICD-10-CM

## 2021-06-23 NOTE — TELEPHONE ENCOUNTER
[facility] called and needed clarification on the note that was sent to them regarding the lifevest. The PT's Insurance lapses in 7 Days.They wanted to know if the discussion of a pacemaker or if she was on a transplant list. I looked through the PT chart and did not see any mention of it. Advised rep.

## 2021-06-23 NOTE — PROGRESS NOTES
GYN Office Visit    Subjective   Chief Complaint   Patient presents with   • Follow-up     Patient c/o vaginal discomfort, itching and burning     Shilpa Vides is a 55 y.o. year old  presenting to be seen for vulvar/vaginal irritation.    She reports 3 months of burning/irritation/discomfort along the outside of the vaginal opening, symptoms are worse near the clitoris.  She is not sexually active.  She has been seen by a general surgeon and a colonoscopy was recently reassuring.  She also saw Dr. Sevilla who prescribe estrogen cream she has been compliant with this for roughly 3 weeks with no relief.  Of note, she did recently have a myocardial infarction and was hospitalized for this.  Mild dysuria.    OB Hx: 2 forceps-assisted vaginal births    Past Medical History:   Diagnosis Date   • Back pain    • Cataract 2021   • Elevated cholesterol    • Frequent headaches    • Hyperlipidemia    • Hypertension    • PONV (postoperative nausea and vomiting)        Past Surgical History:   Procedure Laterality Date   • CARDIAC CATHETERIZATION N/A 2021    Procedure: CORONARY ANGIOGRAPHY;  Surgeon: William [facility]jo IV, MD;  Location: [facility] CATH INVASIVE LOCATION;  Service: Cardiovascular;  Laterality: N/A;   • CATARACT EXTRACTION WITH INTRAOCULAR LENS IMPLANT Left 2021   • CHOLECYSTECTOMY     • COLONOSCOPY     • COLONOSCOPY N/A 2021    Procedure: COLONOSCOPY;  Surgeon: Lynn Durham MD;  Location: [facility] ENDOSCOPY;  Service: Gastroenterology;  Laterality: N/A;   • EYE SURGERY     • HYSTERECTOMY         Family History   Problem Relation Age of Onset   • Heart disease Mother    • Diabetes Mother    • Breast cancer Mother    • Heart disease Father    • Diabetes Father    • Colon cancer Neg Hx    • Esophageal cancer Neg Hx    • Liver cancer Neg Hx    • Liver disease Neg Hx    • Stomach cancer Neg Hx         Social History     Tobacco Use   • Smoking status: Never Smoker   • Smokeless  "tobacco: Never Used   Vaping Use   • Vaping Use: Never used   Substance Use Topics   • Alcohol use: No   • Drug use: No       (Not in a hospital admission)      Flagyl [metronidazole]    Current Outpatient Medications on File Prior to Visit   Medication Sig Dispense Refill   • acetaminophen (Tylenol 8 Hour) 650 MG 8 hr tablet Take 1 tablet by mouth Every 8 (Eight) Hours As Needed for Mild Pain . 15 tablet 0   • aspirin 81 MG EC tablet Take 81 mg by mouth Daily. On hold this week.     • cetirizine (zyrTEC) 10 MG tablet Take 10 mg by mouth Daily.     • Cholecalciferol (VITAMIN D) 2000 UNITS tablet Take 2,000 Units by mouth Daily.     • diazePAM (VALIUM) 5 MG tablet Take 5 mg by mouth Daily As Needed.     • estradiol (ESTRACE) 0.1 MG/GM vaginal cream Insert 2 g into the vagina 3 (Three) Times a Week. 42.5 g 12   • lisinopril (Zestril) 2.5 MG tablet Take 1 tablet by mouth Daily 30 tablet 0   • meclizine (ANTIVERT) 25 MG tablet Take 1 tablet by mouth 4 (Four) Times a Day As Needed for dizziness. 30 tablet 0   • metoprolol succinate XL (TOPROL-XL) 25 MG 24 hr tablet Take 1/2 tablet by mouth Daily 15 tablet 0   • naproxen (NAPROSYN) 375 MG tablet Take 1 tablet by mouth 2 (Two) Times a Day As Needed for Mild Pain . 14 tablet 0   • pravastatin (PRAVACHOL) 40 MG tablet Take 40 mg by mouth daily.     • VITAMIN D PO Take 2 tablets by mouth Daily.     • bisacodyl (Dulcolax) 5 MG EC tablet Take 2 at 3pm and 2 at 7pm day prior to colonoscopy. (Patient taking differently: Take 2 at 3pm and 2 at 7pm day prior to colonoscopy.  Therapy complete.) 4 tablet 0     Current Facility-Administered Medications on File Prior to Visit   Medication Dose Route Frequency Provider Last Rate Last Admin   • meclizine (ANTIVERT) tablet 25 mg  25 mg Oral TID PRN Lucila Slaughter, APRN           Social History    Tobacco Use      Smoking status: Never Smoker      Smokeless tobacco: Never Used         Objective   /62   Ht 162.6 cm (64\")   Wt 58.5 " kg (129 lb)   LMP  (LMP Unknown)   Breastfeeding No   BMI 22.14 kg/m²     Physical Exam:  General Appearance: alert, pleasant, appears stated age, interactive and cooperative  Breasts: Not performed.  Abdomen: no masses, no hepatomegaly, no splenomegaly, soft non-tender, no guarding and no rebound tenderness  Pelvis:  Pelvic: Clinical staff was present for exam  External genitalia: Normal appearance  :  urethral meatus normal;  Vagina:  atrophic mucosal changes are present; erythema noted circumferentially at the introitus         Medical Decision Making:    Assessment   Vulvovaginal candidiasis  Vaginal and vulvar discomfort  Dysuria  Vaginal atrophy     Plan    Orders Placed This Encounter   Procedures   • NuSwab VG+ - Swab, Vagina     Order Specific Question:   Release to patient     Answer:   Immediate   • Urinalysis With Culture If Indicated - Urine, Clean Catch     Order Specific Question:   Release to patient     Answer:   Immediate       Medication Management: Terconazole cream x1 week    Procedures Performed: None    We reviewed her symptoms and exam findings in detail today.  Start terconazole cream as above for empiric treatment of yeast.  Vaginal cultures and urine studies collected today.  We will follow-up by phone once results are available.  If symptoms persist, we will transition to topical steroids.    Primo Pompa MD  Obstetrics and Gynecology  [facility]

## 2021-06-23 NOTE — OUTREACH NOTE
Medical Week 1 Survey      Responses   [facility] patient discharged from?  [facility]   Does the patient have one of the following disease processes/diagnoses(primary or secondary)?  Other   Week 1 attempt successful?  No   Unsuccessful attempts  Attempt 1          Jie Hernandez RN

## 2021-06-24 ENCOUNTER — READMISSION MANAGEMENT (OUTPATIENT)
Dept: CALL CENTER | Facility: HOSPITAL | Age: 56
End: 2021-06-24

## 2021-06-24 LAB
A VAGINAE DNA VAG QL NAA+PROBE: NORMAL SCORE
APPEARANCE UR: CLEAR
BACTERIA #/AREA URNS HPF: NORMAL /HPF
BILIRUB UR QL STRIP: NEGATIVE
BVAB2 DNA VAG QL NAA+PROBE: NORMAL SCORE
C ALBICANS DNA VAG QL NAA+PROBE: NEGATIVE
C GLABRATA DNA VAG QL NAA+PROBE: NEGATIVE
C TRACH DNA VAG QL NAA+PROBE: NEGATIVE
CASTS URNS QL MICRO: NORMAL /LPF
COLOR UR: YELLOW
EPI CELLS #/AREA URNS HPF: NORMAL /HPF (ref 0–10)
GLUCOSE UR QL: NEGATIVE
HGB UR QL STRIP: NEGATIVE
KETONES UR QL STRIP: ABNORMAL
LEUKOCYTE ESTERASE UR QL STRIP: NEGATIVE
MEGA1 DNA VAG QL NAA+PROBE: NORMAL SCORE
MICRO URNS: ABNORMAL
MICRO URNS: ABNORMAL
N GONORRHOEA DNA VAG QL NAA+PROBE: NEGATIVE
NITRITE UR QL STRIP: NEGATIVE
PH UR STRIP: 5.5 [PH] (ref 5–7.5)
PROT UR QL STRIP: NEGATIVE
RBC #/AREA URNS HPF: NORMAL /HPF (ref 0–2)
SP GR UR: 1.02 (ref 1–1.03)
T VAGINALIS DNA VAG QL NAA+PROBE: NEGATIVE
URINALYSIS REFLEX: ABNORMAL
UROBILINOGEN UR STRIP-MCNC: 0.2 MG/DL (ref 0.2–1)
WBC #/AREA URNS HPF: NORMAL /HPF (ref 0–5)

## 2021-06-24 NOTE — OUTREACH NOTE
Medical Week 1 Survey      Responses   [facility] patient discharged from?  [facility]   Does the patient have one of the following disease processes/diagnoses(primary or secondary)?  Other   Week 1 attempt successful?  No   Unsuccessful attempts  Attempt 2          Kely Mao RN

## 2021-06-27 DIAGNOSIS — L90.0 LICHEN SCLEROSUS: Primary | ICD-10-CM

## 2021-06-27 RX ORDER — CLOBETASOL PROPIONATE 0.5 MG/G
OINTMENT TOPICAL
Qty: 60 G | Refills: 6 | Status: SHIPPED | OUTPATIENT
Start: 2021-06-27 | End: 2022-07-21

## 2021-06-29 ENCOUNTER — READMISSION MANAGEMENT (OUTPATIENT)
Dept: CALL CENTER | Facility: HOSPITAL | Age: 56
End: 2021-06-29

## 2021-06-29 NOTE — OUTREACH NOTE
Medical Week 2 Survey      Responses   [facility] [place] patient discharged from?  [facility]   Does the patient have one of the following disease processes/diagnoses(primary or secondary)?  Other   Week 2 attempt successful?  Yes   Call start time  1424   Discharge diagnosis  Stress-induced cardiomyopathy   Call end time  1429   Is patient permission given to speak with other caregiver?  No   List who call center can speak with  Patient only   Meds reviewed with patient/caregiver?  Yes   Is the patient having any side effects they believe may be caused by any medication additions or changes?  No   Does the patient have all medications ordered at discharge?  Yes   Is the patient taking all medications as directed (includes completed medication regime)?  Yes   Does the patient have a primary care provider?   Yes   Does the patient have an appointment with their PCP within 7 days of discharge?  Yes   Has the patient kept scheduled appointments due by today?  Yes   Comments  Spoke with PCP per televisit    Has had f/u with Dr Louis   Has home health visited the patient within 72 hours of discharge?  N/A   What DME was ordered?  life vest   Did the patient receive a copy of their discharge instructions?  Yes   Nursing interventions  Reviewed instructions with patient   What is the patient's perception of their health status since discharge?  Improving   Is the patient/caregiver able to teach back the hierarchy of who to call/visit for symptoms/problems? PCP, Specialist, Home health nurse, Urgent Care, ED, 911  Yes   Week 2 Call Completed?  Yes          Snow Hicks RN

## 2021-07-05 ENCOUNTER — READMISSION MANAGEMENT (OUTPATIENT)
Dept: CALL CENTER | Facility: HOSPITAL | Age: 56
End: 2021-07-05

## 2021-07-05 NOTE — OUTREACH NOTE
Medical Week 3 Survey      Responses   [facility] patient discharged from?  [facility]   Does the patient have one of the following disease processes/diagnoses(primary or secondary)?  Other   Week 3 attempt successful?  No   Unsuccessful attempts  Attempt 1          Sara Gonzales RN

## 2021-07-06 ENCOUNTER — READMISSION MANAGEMENT (OUTPATIENT)
Dept: CALL CENTER | Facility: HOSPITAL | Age: 56
End: 2021-07-06

## 2021-07-06 NOTE — OUTREACH NOTE
Medical Week 3 Survey      Responses   [facility] patient discharged from?  [facility]   Does the patient have one of the following disease processes/diagnoses(primary or secondary)?  Other   Week 3 attempt successful?  No   Unsuccessful attempts  Attempt 2   Discharge diagnosis  Stress-induced cardiomyopathy          Opal Grace RN

## 2021-08-11 ENCOUNTER — APPOINTMENT (OUTPATIENT)
Dept: CARDIOLOGY | Facility: HOSPITAL | Age: 56
End: 2021-08-11

## 2022-05-20 ENCOUNTER — HOSPITAL ENCOUNTER (EMERGENCY)
Facility: HOSPITAL | Age: 57
Discharge: HOME | End: 2022-05-20
Attending: EMERGENCY MEDICINE | Admitting: EMERGENCY MEDICINE

## 2022-05-20 ENCOUNTER — APPOINTMENT (OUTPATIENT)
Dept: GENERAL RADIOLOGY | Facility: HOSPITAL | Age: 57
End: 2022-05-20

## 2022-05-20 ENCOUNTER — APPOINTMENT (OUTPATIENT)
Dept: CT IMAGING | Facility: HOSPITAL | Age: 57
End: 2022-05-20

## 2022-05-20 VITALS
WEIGHT: 139.6 LBS | HEART RATE: 63 BPM | HEIGHT: 64 IN | SYSTOLIC BLOOD PRESSURE: 93 MMHG | BODY MASS INDEX: 23.83 KG/M2 | TEMPERATURE: 98.3 F | OXYGEN SATURATION: 100 % | RESPIRATION RATE: 18 BRPM | DIASTOLIC BLOOD PRESSURE: 58 MMHG

## 2022-05-20 DIAGNOSIS — R11.0 NAUSEA: Primary | ICD-10-CM

## 2022-05-20 DIAGNOSIS — R07.9 CHEST PAIN, UNSPECIFIED TYPE: ICD-10-CM

## 2022-05-20 DIAGNOSIS — R74.8 ELEVATED LIVER ENZYMES: ICD-10-CM

## 2022-05-20 LAB
ALBUMIN SERPL-MCNC: 4.4 G/DL (ref 3.5–5.2)
ALBUMIN/GLOB SERPL: 1.9 G/DL
ALP SERPL-CCNC: 79 U/L (ref 39–117)
ALT SERPL W P-5'-P-CCNC: 176 U/L (ref 1–33)
ANION GAP SERPL CALCULATED.3IONS-SCNC: 9.3 MMOL/L (ref 5–15)
AST SERPL-CCNC: 422 U/L (ref 1–32)
B PARAPERT DNA SPEC QL NAA+PROBE: NOT DETECTED
B PERT DNA SPEC QL NAA+PROBE: NOT DETECTED
BASOPHILS # BLD AUTO: 0.06 10*3/MM3 (ref 0–0.2)
BASOPHILS NFR BLD AUTO: 0.9 % (ref 0–1.5)
BILIRUB SERPL-MCNC: 0.7 MG/DL (ref 0–1.2)
BUN SERPL-MCNC: 12 MG/DL (ref 6–20)
BUN/CREAT SERPL: 18.5 (ref 7–25)
C PNEUM DNA NPH QL NAA+NON-PROBE: NOT DETECTED
CALCIUM SPEC-SCNC: 10.2 MG/DL (ref 8.6–10.5)
CHLORIDE SERPL-SCNC: 106 MMOL/L (ref 98–107)
CO2 SERPL-SCNC: 24.7 MMOL/L (ref 22–29)
CREAT SERPL-MCNC: 0.65 MG/DL (ref 0.57–1)
DEPRECATED RDW RBC AUTO: 41.9 FL (ref 37–54)
EGFRCR SERPLBLD CKD-EPI 2021: 102.8 ML/MIN/1.73
EOSINOPHIL # BLD AUTO: 0.2 10*3/MM3 (ref 0–0.4)
EOSINOPHIL NFR BLD AUTO: 3.1 % (ref 0.3–6.2)
ERYTHROCYTE [DISTWIDTH] IN BLOOD BY AUTOMATED COUNT: 12.4 % (ref 12.3–15.4)
FLUAV SUBTYP SPEC NAA+PROBE: NOT DETECTED
FLUBV RNA ISLT QL NAA+PROBE: NOT DETECTED
GLOBULIN UR ELPH-MCNC: 2.3 GM/DL
GLUCOSE SERPL-MCNC: 98 MG/DL (ref 65–99)
HADV DNA SPEC NAA+PROBE: NOT DETECTED
HAV IGM SERPL QL IA: NORMAL
HBV CORE IGM SERPL QL IA: NORMAL
HBV SURFACE AG SERPL QL IA: NORMAL
HCOV 229E RNA SPEC QL NAA+PROBE: NOT DETECTED
HCOV HKU1 RNA SPEC QL NAA+PROBE: NOT DETECTED
HCOV NL63 RNA SPEC QL NAA+PROBE: NOT DETECTED
HCOV OC43 RNA SPEC QL NAA+PROBE: NOT DETECTED
HCT VFR BLD AUTO: 39.4 % (ref 34–46.6)
HCV AB SER DONR QL: NORMAL
HGB BLD-MCNC: 13.3 G/DL (ref 12–15.9)
HMPV RNA NPH QL NAA+NON-PROBE: NOT DETECTED
HOLD SPECIMEN: NORMAL
HOLD SPECIMEN: NORMAL
HPIV1 RNA ISLT QL NAA+PROBE: NOT DETECTED
HPIV2 RNA SPEC QL NAA+PROBE: NOT DETECTED
HPIV3 RNA NPH QL NAA+PROBE: NOT DETECTED
HPIV4 P GENE NPH QL NAA+PROBE: NOT DETECTED
IMM GRANULOCYTES # BLD AUTO: 0 10*3/MM3 (ref 0–0.05)
IMM GRANULOCYTES NFR BLD AUTO: 0 % (ref 0–0.5)
LIPASE SERPL-CCNC: 33 U/L (ref 13–60)
LYMPHOCYTES # BLD AUTO: 1.66 10*3/MM3 (ref 0.7–3.1)
LYMPHOCYTES NFR BLD AUTO: 25.6 % (ref 19.6–45.3)
M PNEUMO IGG SER IA-ACNC: NOT DETECTED
MCH RBC QN AUTO: 31.1 PG (ref 26.6–33)
MCHC RBC AUTO-ENTMCNC: 33.8 G/DL (ref 31.5–35.7)
MCV RBC AUTO: 92.1 FL (ref 79–97)
MONOCYTES # BLD AUTO: 0.36 10*3/MM3 (ref 0.1–0.9)
MONOCYTES NFR BLD AUTO: 5.5 % (ref 5–12)
NEUTROPHILS NFR BLD AUTO: 4.21 10*3/MM3 (ref 1.7–7)
NEUTROPHILS NFR BLD AUTO: 64.9 % (ref 42.7–76)
NRBC BLD AUTO-RTO: 0 /100 WBC (ref 0–0.2)
PLATELET # BLD AUTO: 200 10*3/MM3 (ref 140–450)
PMV BLD AUTO: 9.3 FL (ref 6–12)
POTASSIUM SERPL-SCNC: 4.4 MMOL/L (ref 3.5–5.2)
PROT SERPL-MCNC: 6.7 G/DL (ref 6–8.5)
RBC # BLD AUTO: 4.28 10*6/MM3 (ref 3.77–5.28)
RHINOVIRUS RNA SPEC NAA+PROBE: NOT DETECTED
RSV RNA NPH QL NAA+NON-PROBE: NOT DETECTED
SARS-COV-2 RNA NPH QL NAA+NON-PROBE: NOT DETECTED
SODIUM SERPL-SCNC: 140 MMOL/L (ref 136–145)
TROPONIN T SERPL-MCNC: <0.01 NG/ML (ref 0–0.03)
TROPONIN T SERPL-MCNC: <0.01 NG/ML (ref 0–0.03)
WBC NRBC COR # BLD: 6.49 10*3/MM3 (ref 3.4–10.8)
WHOLE BLOOD HOLD COAG: NORMAL
WHOLE BLOOD HOLD SPECIMEN: NORMAL

## 2022-05-20 PROCEDURE — 96374 THER/PROPH/DIAG INJ IV PUSH: CPT

## 2022-05-20 PROCEDURE — 99284 EMERGENCY DEPT VISIT MOD MDM: CPT

## 2022-05-20 PROCEDURE — 25010000002 IOPAMIDOL 61 % SOLUTION: Performed by: EMERGENCY MEDICINE

## 2022-05-20 PROCEDURE — 80053 COMPREHEN METABOLIC PANEL: CPT

## 2022-05-20 PROCEDURE — 0202U NFCT DS 22 TRGT SARS-COV-2: CPT | Performed by: EMERGENCY MEDICINE

## 2022-05-20 PROCEDURE — 84484 ASSAY OF TROPONIN QUANT: CPT | Performed by: EMERGENCY MEDICINE

## 2022-05-20 PROCEDURE — 36415 COLL VENOUS BLD VENIPUNCTURE: CPT

## 2022-05-20 PROCEDURE — 93005 ELECTROCARDIOGRAM TRACING: CPT | Performed by: EMERGENCY MEDICINE

## 2022-05-20 PROCEDURE — 84484 ASSAY OF TROPONIN QUANT: CPT

## 2022-05-20 PROCEDURE — 85025 COMPLETE CBC W/AUTO DIFF WBC: CPT

## 2022-05-20 PROCEDURE — 25010000002 ONDANSETRON PER 1 MG: Performed by: EMERGENCY MEDICINE

## 2022-05-20 PROCEDURE — 70450 CT HEAD/BRAIN W/O DYE: CPT

## 2022-05-20 PROCEDURE — 74177 CT ABD & PELVIS W/CONTRAST: CPT

## 2022-05-20 PROCEDURE — 80074 ACUTE HEPATITIS PANEL: CPT | Performed by: EMERGENCY MEDICINE

## 2022-05-20 PROCEDURE — 83690 ASSAY OF LIPASE: CPT | Performed by: EMERGENCY MEDICINE

## 2022-05-20 PROCEDURE — 71045 X-RAY EXAM CHEST 1 VIEW: CPT

## 2022-05-20 RX ORDER — ONDANSETRON 4 MG/1
4 TABLET, ORALLY DISINTEGRATING ORAL EVERY 6 HOURS PRN
Qty: 10 TABLET | Refills: 0 | Status: SHIPPED | OUTPATIENT
Start: 2022-05-20

## 2022-05-20 RX ORDER — ASPIRIN 325 MG
325 TABLET ORAL ONCE
Status: COMPLETED | OUTPATIENT
Start: 2022-05-20 | End: 2022-05-20

## 2022-05-20 RX ORDER — ONDANSETRON 2 MG/ML
4 INJECTION INTRAMUSCULAR; INTRAVENOUS ONCE
Status: COMPLETED | OUTPATIENT
Start: 2022-05-20 | End: 2022-05-20

## 2022-05-20 RX ORDER — SODIUM CHLORIDE 0.9 % (FLUSH) 0.9 %
10 SYRINGE (ML) INJECTION AS NEEDED
Status: DISCONTINUED | OUTPATIENT
Start: 2022-05-20 | End: 2022-05-20 | Stop reason: HOSPADM

## 2022-05-20 RX ORDER — PANTOPRAZOLE SODIUM 40 MG/1
40 TABLET, DELAYED RELEASE ORAL DAILY
Qty: 30 TABLET | Refills: 0 | Status: SHIPPED | OUTPATIENT
Start: 2022-05-20 | End: 2022-08-15

## 2022-05-20 RX ADMIN — ASPIRIN 325 MG ORAL TABLET 325 MG: 325 PILL ORAL at 11:34

## 2022-05-20 RX ADMIN — SODIUM CHLORIDE 1000 ML: 9 INJECTION, SOLUTION INTRAVENOUS at 12:52

## 2022-05-20 RX ADMIN — ONDANSETRON 4 MG: 2 INJECTION INTRAMUSCULAR; INTRAVENOUS at 11:34

## 2022-05-20 RX ADMIN — IOPAMIDOL 100 ML: 612 INJECTION, SOLUTION INTRAVENOUS at 13:55

## 2022-05-20 NOTE — ED PROVIDER NOTES
"Subjective   56-year-old female presenting with multiple complaints.  She states that this morning she started having some chest discomfort, describes a achy sensation to her lower chest.  This radiates to her back and shoulder blades.  It is intermittent without alleviating or aggravating factors.  It is associated with nausea.  She also describes a month or longer of diffuse throbbing headache and a unpleasant taste in her mouth.  She has had some congestion as well.  No fevers, vomiting, shortness of breath.  She does tell me that she has a history of a \"stress-induced MI\".          Review of Systems   Constitutional: Negative.    HENT: Positive for congestion.    Eyes: Negative.    Respiratory: Negative.    Cardiovascular: Positive for chest pain.   Gastrointestinal: Positive for nausea. Negative for abdominal pain and vomiting.   Genitourinary: Negative.    Musculoskeletal: Negative.    Skin: Negative.    Neurological: Positive for headaches. Negative for weakness and numbness.   Psychiatric/Behavioral: Negative.        Past Medical History:   Diagnosis Date   • Back pain    • Cataract 05/20/2021   • Elevated cholesterol    • Frequent headaches    • Hyperlipidemia    • Hypertension    • PONV (postoperative nausea and vomiting)        Allergies   Allergen Reactions   • Flagyl [Metronidazole] Nausea And Vomiting       Past Surgical History:   Procedure Laterality Date   • CARDIAC CATHETERIZATION N/A 6/18/2021    Procedure: CORONARY ANGIOGRAPHY;  Surgeon: William Camejo IV, MD;  Location: [facility] CATH INVASIVE LOCATION;  Service: Cardiovascular;  Laterality: N/A;   • CATARACT EXTRACTION WITH INTRAOCULAR LENS IMPLANT Left 05/20/2021   • CHOLECYSTECTOMY     • COLONOSCOPY     • COLONOSCOPY N/A 5/21/2021    Procedure: COLONOSCOPY;  Surgeon: Lynn Durham MD;  Location: [facility] ENDOSCOPY;  Service: Gastroenterology;  Laterality: N/A;   • EYE SURGERY     • HYSTERECTOMY  2012       Family History   Problem " Relation Age of Onset   • Heart disease Mother    • Diabetes Mother    • Breast cancer Mother    • Heart disease Father    • Diabetes Father    • Colon cancer Neg Hx    • Esophageal cancer Neg Hx    • Liver cancer Neg Hx    • Liver disease Neg Hx    • Stomach cancer Neg Hx        Social History     Socioeconomic History   • Marital status:    Tobacco Use   • Smoking status: Never Smoker   • Smokeless tobacco: Never Used   Vaping Use   • Vaping Use: Never used   Substance and Sexual Activity   • Alcohol use: No   • Drug use: No   • Sexual activity: Defer           Objective   Physical Exam  Constitutional:       General: She is not in acute distress.     Appearance: Normal appearance. She is not ill-appearing, toxic-appearing or diaphoretic.   HENT:      Head: Normocephalic and atraumatic.      Right Ear: External ear normal.      Left Ear: External ear normal.      Nose: Nose normal.      Mouth/Throat:      Mouth: Mucous membranes are moist.      Pharynx: Oropharynx is clear.   Eyes:      Extraocular Movements: Extraocular movements intact.      Conjunctiva/sclera: Conjunctivae normal.      Pupils: Pupils are equal, round, and reactive to light.   Cardiovascular:      Rate and Rhythm: Normal rate and regular rhythm.      Pulses: Normal pulses.      Heart sounds: Normal heart sounds.   Pulmonary:      Effort: Pulmonary effort is normal. No respiratory distress.      Breath sounds: Normal breath sounds.   Abdominal:      General: Bowel sounds are normal. There is no distension.      Tenderness: There is no abdominal tenderness.   Musculoskeletal:         General: No swelling, tenderness or deformity. Normal range of motion.      Cervical back: Normal range of motion.   Skin:     General: Skin is warm and dry.      Capillary Refill: Capillary refill takes less than 2 seconds.      Findings: No rash.   Neurological:      General: No focal deficit present.      Mental Status: She is alert and oriented to person,  place, and time.      Comments: Cranial nerves intact, normal strength and sensation, gait normal   Psychiatric:         Mood and Affect: Mood normal.         Behavior: Behavior normal.         Procedures           ED Course                                                 MDM  Number of Diagnoses or Management Options  Chest pain, unspecified type  Elevated liver enzymes  Nausea  Diagnosis management comments: 56-year-old female with chest pain and nausea.  Well-developed, well-nourished lady in no distress with exam as above.  Her vital signs are normal.  Her exam is relatively nonfocal.  Will obtain labs, EKG, chest x-ray and head CT.  Will give symptomatic treatment.  Disposition pending.    DDx: Viral illness, anxiety, ACS, sinusitis, migraine    EKG interpreted by me: Sinus rhythm, normal rate, first-degree AV block, no acute ST/T changes, this is an abnormal EKG    12:43 EDT Work up thus far notable for transaminitis. She has history of cholecystectomy. Will add hepatitis panel, repeat troponin, CT abdomen/pelvis. Disposition pending.    15:02 EDT CT scan reveals no acute findings, some mild ductal dilatation, likely chronic after cholecystectomy.  She is resting comfortably.  I do feel she is safe for discharge home.  Advised close outpatient follow-up.  She is comfortable with and understanding the plan.       Amount and/or Complexity of Data Reviewed  Decide to obtain previous medical records or to obtain history from someone other than the patient: yes        Final diagnoses:   Nausea   Chest pain, unspecified type   Elevated liver enzymes          Taurus Crenshaw MD  05/20/22 9553

## 2022-07-20 DIAGNOSIS — L90.0 LICHEN SCLEROSUS: ICD-10-CM

## 2022-07-21 RX ORDER — CLOBETASOL PROPIONATE 0.5 MG/G
OINTMENT TOPICAL
Qty: 60 G | Refills: 0 | Status: SHIPPED | OUTPATIENT
Start: 2022-07-21

## 2022-07-25 ENCOUNTER — TRANSCRIBE ORDERS (OUTPATIENT)
Dept: ADMINISTRATIVE | Facility: HOSPITAL | Age: 57
End: 2022-07-25

## 2022-07-25 DIAGNOSIS — R10.13 EPIGASTRIC PAIN: Primary | ICD-10-CM

## 2022-07-25 DIAGNOSIS — R74.8 ELEVATED LIVER ENZYMES: ICD-10-CM

## 2022-08-09 ENCOUNTER — HOSPITAL ENCOUNTER (OUTPATIENT)
Dept: CT IMAGING | Facility: HOSPITAL | Age: 57
Discharge: HOME | End: 2022-08-09
Admitting: NURSE PRACTITIONER

## 2022-08-09 DIAGNOSIS — R74.8 ELEVATED LIVER ENZYMES: ICD-10-CM

## 2022-08-09 DIAGNOSIS — R10.13 EPIGASTRIC PAIN: ICD-10-CM

## 2022-08-09 PROCEDURE — 74160 CT ABDOMEN W/CONTRAST: CPT

## 2022-08-09 PROCEDURE — 25010000002 IOPAMIDOL 61 % SOLUTION: Performed by: NURSE PRACTITIONER

## 2022-08-09 RX ADMIN — IOPAMIDOL 100 ML: 612 INJECTION, SOLUTION INTRAVENOUS at 11:08

## 2022-08-15 ENCOUNTER — OFFICE VISIT (OUTPATIENT)
Dept: GASTROENTEROLOGY | Facility: CLINIC | Age: 57
End: 2022-08-15

## 2022-08-15 VITALS
HEIGHT: 64 IN | DIASTOLIC BLOOD PRESSURE: 62 MMHG | SYSTOLIC BLOOD PRESSURE: 100 MMHG | WEIGHT: 139 LBS | BODY MASS INDEX: 23.73 KG/M2

## 2022-08-15 DIAGNOSIS — R11.0 NAUSEA: ICD-10-CM

## 2022-08-15 DIAGNOSIS — R79.89 ELEVATED LFTS: ICD-10-CM

## 2022-08-15 DIAGNOSIS — R10.10 PAIN OF UPPER ABDOMEN: Primary | ICD-10-CM

## 2022-08-15 DIAGNOSIS — Z12.11 ENCOUNTER FOR SCREENING FOR MALIGNANT NEOPLASM OF COLON: ICD-10-CM

## 2022-08-15 DIAGNOSIS — K21.9 GASTROESOPHAGEAL REFLUX DISEASE WITHOUT ESOPHAGITIS: ICD-10-CM

## 2022-08-15 PROCEDURE — 99204 OFFICE O/P NEW MOD 45 MIN: CPT | Performed by: INTERNAL MEDICINE

## 2022-08-15 RX ORDER — LISINOPRIL 2.5 MG/1
2.5 TABLET ORAL DAILY
COMMUNITY

## 2022-08-15 RX ORDER — DICYCLOMINE HCL 20 MG
20 TABLET ORAL 3 TIMES DAILY PRN
Qty: 30 TABLET | Refills: 1 | Status: SHIPPED | OUTPATIENT
Start: 2022-08-15

## 2022-08-15 RX ORDER — LANSOPRAZOLE 30 MG/1
30 CAPSULE, DELAYED RELEASE ORAL DAILY
Qty: 90 CAPSULE | Refills: 1 | Status: SHIPPED | OUTPATIENT
Start: 2022-08-15

## 2022-08-15 RX ORDER — METOPROLOL SUCCINATE 25 MG/1
25 TABLET, EXTENDED RELEASE ORAL DAILY
COMMUNITY

## 2022-08-15 RX ORDER — AMOXICILLIN 500 MG/1
1000 CAPSULE ORAL 2 TIMES DAILY
COMMUNITY
End: 2022-09-15

## 2022-08-15 NOTE — PROGRESS NOTES
New Patient Consult      Date: 08/15/2022   Patient Name: Shilpa Vides  MRN: [id number]  : 1965     Referring Physician: Johana Galicia, NPMaceyC    Chief Complaint   Patient presents with   • Consult   • Abdominal Pain       History of Present Illness: Shilpa Vides is a 56 y.o. female who is here today to establish care with Gastroenterology for evaluation of her ongoing upper abdominal pain.    She states that she developed upper abdominal pain mainly in the epigastric region about 3 months ago in May 2022.  He was in the emergency room at that time and her blood work revealed elevated liver enzymes.  CT abdomen pelvis done also showed mild extrahepatic biliary dilatation.  She continued to have intermittent upper abdominal pain since then with the nausea without any vomiting.  She had a gallbladder removed many years ago for abdominal pain no gallstone as such as per patient.  She was recently seen by cardiology and her statins stopped due to her elevated liver enzymes.  Patient denies any prior history of liver abnormalities or liver issues.  No family history of liver cirrhosis.  Patient is nonalcoholic.    She does have a occasional reflux symptoms for which she takes Pepcid as needed. She denies odynophagia or dysphagia. Deny any hematochezia or melena. There is no prior history of liver or pancreatic disease. There is no history of anemia. Prior history of EGD many years ago details unknown., Last  Colonoscopy was in May 2021was normal by Dr Durham. No family history of colon cancer or any GI malignancy.  Denies alcohol abuse or cigarette smoking.     Subjective      Past Medical History:   Past Medical History:   Diagnosis Date   • Back pain    • Cataract 2021   • Elevated cholesterol    • Frequent headaches    • Hyperlipidemia    • Hypertension    • PONV (postoperative nausea and vomiting)        Past Surgical History:   Past Surgical History:   Procedure Laterality Date   •  CARDIAC CATHETERIZATION N/A 6/18/2021    Procedure: CORONARY ANGIOGRAPHY;  Surgeon: William Camejo IV, MD;  Location: [facility] CATH INVASIVE LOCATION;  Service: Cardiovascular;  Laterality: N/A;   • CATARACT EXTRACTION WITH INTRAOCULAR LENS IMPLANT Left 05/20/2021   • CHOLECYSTECTOMY     • COLONOSCOPY     • COLONOSCOPY N/A 5/21/2021    Procedure: COLONOSCOPY;  Surgeon: Lynn Durham MD;  Location: [facility] ENDOSCOPY;  Service: Gastroenterology;  Laterality: N/A;   • EYE SURGERY     • HYSTERECTOMY  2012       Family History:   Family History   Problem Relation Age of Onset   • Heart disease Mother    • Diabetes Mother    • Breast cancer Mother    • Heart disease Father    • Diabetes Father    • Colon cancer Neg Hx    • Esophageal cancer Neg Hx    • Liver cancer Neg Hx    • Liver disease Neg Hx    • Stomach cancer Neg Hx        Social History:   Social History     Socioeconomic History   • Marital status:    Tobacco Use   • Smoking status: Never Smoker   • Smokeless tobacco: Never Used   Vaping Use   • Vaping Use: Never used   Substance and Sexual Activity   • Alcohol use: No   • Drug use: No   • Sexual activity: Defer         Current Outpatient Medications:   •  amoxicillin (AMOXIL) 500 MG capsule, Take 1,000 mg by mouth 2 (Two) Times a Day., Disp: , Rfl:   •  aspirin 81 MG EC tablet, Take 81 mg by mouth Daily. On hold this week., Disp: , Rfl:   •  cetirizine (zyrTEC) 10 MG tablet, Take 10 mg by mouth Daily., Disp: , Rfl:   •  Cholecalciferol (VITAMIN D) 2000 UNITS tablet, Take 2,000 Units by mouth Daily., Disp: , Rfl:   •  diazePAM (VALIUM) 5 MG tablet, Take 5 mg by mouth Daily As Needed., Disp: , Rfl:   •  lisinopril (PRINIVIL,ZESTRIL) 2.5 MG tablet, Take 2.5 mg by mouth Daily., Disp: , Rfl:   •  metoprolol succinate XL (TOPROL-XL) 25 MG 24 hr tablet, Take 25 mg by mouth Daily., Disp: , Rfl:   •  ondansetron ODT (ZOFRAN-ODT) 4 MG disintegrating tablet, Place 1 tablet under the tongue Every 6 (Six)  Hours As Needed for Nausea or Vomiting., Disp: 10 tablet, Rfl: 0  •  VITAMIN D PO, Take 2 tablets by mouth Daily., Disp: , Rfl:   •  acetaminophen (Tylenol 8 Hour) 650 MG 8 hr tablet, Take 1 tablet by mouth Every 8 (Eight) Hours As Needed for Mild Pain ., Disp: 15 tablet, Rfl: 0  •  clobetasol (TEMOVATE) 0.05 % ointment, APPLY A DIME-SIZED AMOUNT TO THE AFFECTED AREA NIGHTLY FOR 6 WEEKS, THEN 1-3 TIMES PER WEEK THEREAFTER, Disp: 60 g, Rfl: 0  •  dicyclomine (BENTYL) 20 MG tablet, Take 1 tablet by mouth 3 (Three) Times a Day As Needed (abdominal pain)., Disp: 30 tablet, Rfl: 1  •  estradiol (ESTRACE) 0.1 MG/GM vaginal cream, Insert 2 g into the vagina 3 (Three) Times a Week., Disp: 42.5 g, Rfl: 12  •  lansoprazole (PREVACID) 30 MG capsule, Take 1 capsule by mouth Daily., Disp: 90 capsule, Rfl: 1  •  lisinopril (Zestril) 2.5 MG tablet, Take 1 tablet by mouth Daily, Disp: 30 tablet, Rfl: 0  •  meclizine (ANTIVERT) 25 MG tablet, Take 1 tablet by mouth 4 (Four) Times a Day As Needed for dizziness., Disp: 30 tablet, Rfl: 0  •  metoprolol succinate XL (TOPROL-XL) 25 MG 24 hr tablet, Take 1/2 tablet by mouth Daily, Disp: 15 tablet, Rfl: 0  •  naproxen (NAPROSYN) 375 MG tablet, Take 1 tablet by mouth 2 (Two) Times a Day As Needed for Mild Pain ., Disp: 14 tablet, Rfl: 0  •  pravastatin (PRAVACHOL) 40 MG tablet, Take 40 mg by mouth daily., Disp: , Rfl:     Current Facility-Administered Medications:   •  meclizine (ANTIVERT) tablet 25 mg, 25 mg, Oral, TID PRN, Lucila Slaughter, APRN    Allergies   Allergen Reactions   • Flagyl [Metronidazole] Nausea And Vomiting       Review of Systems:   Review of Systems   Constitutional: Positive for unexpected weight loss. Negative for appetite change, fatigue and fever.   HENT: Negative for trouble swallowing.    Gastrointestinal: Positive for abdominal pain, nausea and GERD. Negative for abdominal distention, anal bleeding, blood in stool, constipation, diarrhea, rectal pain, vomiting and  "indigestion.       The following portions of the patient's history were reviewed and updated as appropriate: allergies, current medications, past family history, past medical history, past social history, past surgical history and problem list.    Objective     Physical Exam:  Vital Signs:   Vitals:    08/15/22 1455   BP: 100/62   Weight: 63 kg (139 lb)   Height: 162.6 cm (64\")       Physical Exam  Constitutional:       Appearance: Normal appearance.   HENT:      Head: Normocephalic and atraumatic.   Eyes:      Conjunctiva/sclera: Conjunctivae normal.   Abdominal:      General: Abdomen is flat. There is no distension.      Palpations: There is no mass.      Tenderness: There is no abdominal tenderness. There is no guarding or rebound.      Hernia: No hernia is present.   Musculoskeletal:      Cervical back: Normal range of motion and neck supple.   Neurological:      Mental Status: She is alert.           Results Review:   I have reviewed the patient's new clinical and imaging results and agree with the interpretation.     Admission on 05/20/2022, Discharged on 05/20/2022   Component Date Value Ref Range Status   • Glucose 05/20/2022 98  65 - 99 mg/dL Final   • BUN 05/20/2022 12  6 - 20 mg/dL Final   • Creatinine 05/20/2022 0.65  0.57 - 1.00 mg/dL Final   • Sodium 05/20/2022 140  136 - 145 mmol/L Final   • Potassium 05/20/2022 4.4  3.5 - 5.2 mmol/L Final   • Chloride 05/20/2022 106  98 - 107 mmol/L Final   • CO2 05/20/2022 24.7  22.0 - 29.0 mmol/L Final   • Calcium 05/20/2022 10.2  8.6 - 10.5 mg/dL Final   • Total Protein 05/20/2022 6.7  6.0 - 8.5 g/dL Final   • Albumin 05/20/2022 4.40  3.50 - 5.20 g/dL Final   • ALT (SGPT) 05/20/2022 176 (A) 1 - 33 U/L Final   • AST (SGOT) 05/20/2022 422 (A) 1 - 32 U/L Final   • Alkaline Phosphatase 05/20/2022 79  39 - 117 U/L Final   • Total Bilirubin 05/20/2022 0.7  0.0 - 1.2 mg/dL Final   • Globulin 05/20/2022 2.3  gm/dL Final   • A/G Ratio 05/20/2022 1.9  g/dL Final   • " BUN/Creatinine Ratio 05/20/2022 18.5  7.0 - 25.0 Final   • Anion Gap 05/20/2022 9.3  5.0 - 15.0 mmol/L Final   • eGFR 05/20/2022 102.8  >60.0 mL/min/1.73 Final    National Kidney Foundation and American Society of Nephrology (ASN) Task Force recommended calculation based on the Chronic Kidney Disease Epidemiology Collaboration (CKD-EPI) equation refit without adjustment for race.   • Troponin T 05/20/2022 <0.010  0.000 - 0.030 ng/mL Final   • Extra Tube 05/20/2022 Hold for add-ons.   Final    Auto resulted.   • Extra Tube 05/20/2022 hold for add-on   Final    Auto resulted   • Extra Tube 05/20/2022 Hold for add-ons.   Final    Auto resulted.   • Extra Tube 05/20/2022 Hold for add-ons.   Final    Auto resulted   • WBC 05/20/2022 6.49  3.40 - 10.80 10*3/mm3 Final   • RBC 05/20/2022 4.28  3.77 - 5.28 10*6/mm3 Final   • Hemoglobin 05/20/2022 13.3  12.0 - 15.9 g/dL Final   • Hematocrit 05/20/2022 39.4  34.0 - 46.6 % Final   • MCV 05/20/2022 92.1  79.0 - 97.0 fL Final   • MCH 05/20/2022 31.1  26.6 - 33.0 pg Final   • MCHC 05/20/2022 33.8  31.5 - 35.7 g/dL Final   • RDW 05/20/2022 12.4  12.3 - 15.4 % Final   • RDW-SD 05/20/2022 41.9  37.0 - 54.0 fl Final   • MPV 05/20/2022 9.3  6.0 - 12.0 fL Final   • Platelets 05/20/2022 200  140 - 450 10*3/mm3 Final   • Neutrophil % 05/20/2022 64.9  42.7 - 76.0 % Final   • Lymphocyte % 05/20/2022 25.6  19.6 - 45.3 % Final   • Monocyte % 05/20/2022 5.5  5.0 - 12.0 % Final   • Eosinophil % 05/20/2022 3.1  0.3 - 6.2 % Final   • Basophil % 05/20/2022 0.9  0.0 - 1.5 % Final   • Immature Grans % 05/20/2022 0.0  0.0 - 0.5 % Final   • Neutrophils, Absolute 05/20/2022 4.21  1.70 - 7.00 10*3/mm3 Final   • Lymphocytes, Absolute 05/20/2022 1.66  0.70 - 3.10 10*3/mm3 Final   • Monocytes, Absolute 05/20/2022 0.36  0.10 - 0.90 10*3/mm3 Final   • Eosinophils, Absolute 05/20/2022 0.20  0.00 - 0.40 10*3/mm3 Final   • Basophils, Absolute 05/20/2022 0.06  0.00 - 0.20 10*3/mm3 Final   • Immature Grans,  Absolute 05/20/2022 0.00  0.00 - 0.05 10*3/mm3 Final   • nRBC 05/20/2022 0.0  0.0 - 0.2 /100 WBC Final   • ADENOVIRUS, PCR 05/20/2022 Not Detected  Not Detected Final   • Coronavirus 229E 05/20/2022 Not Detected  Not Detected Final   • Coronavirus HKU1 05/20/2022 Not Detected  Not Detected Final   • Coronavirus NL63 05/20/2022 Not Detected  Not Detected Final   • Coronavirus OC43 05/20/2022 Not Detected  Not Detected Final   • COVID19 05/20/2022 Not Detected  Not Detected - Ref. Range Final   • Human Metapneumovirus 05/20/2022 Not Detected  Not Detected Final   • Human Rhinovirus/Enterovirus 05/20/2022 Not Detected  Not Detected Final   • Influenza A PCR 05/20/2022 Not Detected  Not Detected Final   • Influenza B PCR 05/20/2022 Not Detected  Not Detected Final   • Parainfluenza Virus 1 05/20/2022 Not Detected  Not Detected Final   • Parainfluenza Virus 2 05/20/2022 Not Detected  Not Detected Final   • Parainfluenza Virus 3 05/20/2022 Not Detected  Not Detected Final   • Parainfluenza Virus 4 05/20/2022 Not Detected  Not Detected Final   • RSV, PCR 05/20/2022 Not Detected  Not Detected Final   • Bordetella pertussis pcr 05/20/2022 Not Detected  Not Detected Final   • Bordetella parapertussis PCR 05/20/2022 Not Detected  Not Detected Final   • Chlamydophila pneumoniae PCR 05/20/2022 Not Detected  Not Detected Final   • Mycoplasma pneumo by PCR 05/20/2022 Not Detected  Not Detected Final   • Lipase 05/20/2022 33  13 - 60 U/L Final   • Hepatitis B Surface Ag 05/20/2022 Non-Reactive  Non-Reactive Final   • Hep A IgM 05/20/2022 Non-Reactive  Non-Reactive Final   • Hep B C IgM 05/20/2022 Non-Reactive  Non-Reactive Final   • Hepatitis C Ab 05/20/2022 Non-Reactive  Non-Reactive Final   • Troponin T 05/20/2022 <0.010  0.000 - 0.030 ng/mL Final      CT Head Without Contrast    Result Date: 5/20/2022  Unremarkable unenhanced CT of the brain.   This study was performed with techniques to keep radiation doses as low as reasonably  achievable (ALARA). Individualized dose reduction techniques using automated exposure control or adjustment of vA and/or kV according to the patient size were employed.  This report was signed and finalized on 5/20/2022 1:56 PM by Joaquin Caal MD.    CT Abdomen With Contrast    Result Date: 8/10/2022  No evidence of acute intra-abdominal process. No significant interval change from the prior exam.  400.28 mGy.cm   This study was performed with techniques to keep radiation doses as low as reasonably achievable (ALARA). Individualized dose reduction techniques using automated exposure control or adjustment of mA and/or kV according to the patient size were employed.     Images were reviewed, interpreted, and dictated by PAVITHRA Guevara Transcribed by Edel Rogers PA-C.  This report was signed and finalized on 8/10/2022 1:06 PM by PAVITHRA Hermosillo.    CT Abdomen Pelvis With Contrast    Result Date: 5/20/2022  1. Mild extrahepatic biliary ductal dilatation. MRCP may be considered. 2. Otherwise unremarkable.   This study was performed with techniques to keep radiation doses as low as reasonably achievable (ALARA). Individualized dose reduction techniques using automated exposure control or adjustment of vA and/or kV according to the patient size were employed.  This report was signed and finalized on 5/20/2022 2:21 PM by Joaquin Caal MD.    XR Chest 1 View    Result Date: 5/20/2022  Unremarkable portable chest.  This report was signed and finalized on 5/20/2022 11:25 AM by Joaquin Caal MD.      Assessment / Plan      Assessment & Plan:  1. Pain of upper abdomen  2. Elevated LFTs  3. Nausea  Etiology of her abdominal pain and elevated liver enzymes is unclear.  No prior history of elevated liver enzymes except borderline elevated AST in 2021.  This time in May 2022 she was noted to have a hepatocellular pattern of elevated liver enzymes.  CT abdomen pelvis done with contrast on 05/20/2022 revealed  mild extrahepatic biliary ductal dilatation.  Given her epigastric pain, there is a concern for choledocholithiasis however liver pattern is mostly hepatocellular she raises the possibility of autoimmune hepatitis or drug-induced liver injury.  No new medicines except patient was on a statin.  No history of any viral infections.  She had a prior gallbladder removed for abdominal pain patient is not sure about the gallstones.    Her liver enzymes in May 2022 for more than 8-10 times upper limit of normal.  Repeat labs done in July 2022 revealed improvement in the liver enzymes to 2-3 times upper limit of normal.  Acute hepatitis panel is negative.    She needs an MRI scan for further evaluation.   We will get a repeat CMP, PT/INR, NANCY, anti-smooth muscle antibody, antimitochondrial antibody, iron profile and ceruloplasmin, alpha-1 antitrypsin  Patient may also need EGD in the near future.  Bentyl 20 m p.o. 3 times daily as needed.  Continue to hold statin for now.    - MRI abdomen wo contrast mrcp; Future  - Comprehensive Metabolic Panel; Future    4. Gastroesophageal reflux disease without esophagitis  Given her epigastric pain Prevacid 30 mg p.o. daily prescribed.    5. Encounter for screening for malignant neoplasm of colon  As per record last colonoscopy was in May 2021 by Dr. Carrasquillo and reported as normal.  Based on the record she needs repeat colonoscopy in 2031.        Follow Up:   Return in about 4 weeks (around 9/12/2022).    Adrian Melton MD  [facility]  8/15/2022  18:05 EDT    Please note that portions of this note may have been completed with a voice recognition program.

## 2022-08-16 ENCOUNTER — HOSPITAL ENCOUNTER (OUTPATIENT)
Dept: MRI IMAGING | Facility: HOSPITAL | Age: 57
Discharge: HOME | End: 2022-08-16
Admitting: INTERNAL MEDICINE

## 2022-08-16 DIAGNOSIS — R79.89 ELEVATED LFTS: ICD-10-CM

## 2022-08-16 PROCEDURE — 74181 MRI ABDOMEN W/O CONTRAST: CPT

## 2022-08-18 ENCOUNTER — LAB (OUTPATIENT)
Dept: LAB | Facility: HOSPITAL | Age: 57
End: 2022-08-18

## 2022-08-18 DIAGNOSIS — R10.10 PAIN OF UPPER ABDOMEN: ICD-10-CM

## 2022-08-18 DIAGNOSIS — R79.89 ELEVATED LFTS: ICD-10-CM

## 2022-08-18 LAB
ALBUMIN SERPL-MCNC: 4.2 G/DL (ref 3.5–5.2)
ALBUMIN/GLOB SERPL: 1.9 G/DL
ALP SERPL-CCNC: 63 U/L (ref 39–117)
ALPHA1 GLOB MFR UR ELPH: 119 MG/DL (ref 90–200)
ALT SERPL W P-5'-P-CCNC: 8 U/L (ref 1–33)
ANION GAP SERPL CALCULATED.3IONS-SCNC: 10.2 MMOL/L (ref 5–15)
AST SERPL-CCNC: 17 U/L (ref 1–32)
BILIRUB SERPL-MCNC: 0.3 MG/DL (ref 0–1.2)
BUN SERPL-MCNC: 12 MG/DL (ref 6–20)
BUN/CREAT SERPL: 16.2 (ref 7–25)
CALCIUM SPEC-SCNC: 9.5 MG/DL (ref 8.6–10.5)
CERULOPLASMIN SERPL-MCNC: 25 MG/DL (ref 19–39)
CHLORIDE SERPL-SCNC: 101 MMOL/L (ref 98–107)
CO2 SERPL-SCNC: 25.8 MMOL/L (ref 22–29)
CREAT SERPL-MCNC: 0.74 MG/DL (ref 0.57–1)
EGFRCR SERPLBLD CKD-EPI 2021: 94.5 ML/MIN/1.73
FERRITIN SERPL-MCNC: 108 NG/ML (ref 13–150)
GLOBULIN UR ELPH-MCNC: 2.2 GM/DL
GLUCOSE SERPL-MCNC: 80 MG/DL (ref 65–99)
IRON 24H UR-MRATE: 83 MCG/DL (ref 37–145)
IRON SATN MFR SERPL: 20 % (ref 20–50)
POTASSIUM SERPL-SCNC: 4.3 MMOL/L (ref 3.5–5.2)
PROT SERPL-MCNC: 6.4 G/DL (ref 6–8.5)
SODIUM SERPL-SCNC: 137 MMOL/L (ref 136–145)
TIBC SERPL-MCNC: 423 MCG/DL (ref 298–536)
TRANSFERRIN SERPL-MCNC: 284 MG/DL (ref 200–360)

## 2022-08-18 PROCEDURE — 82103 ALPHA-1-ANTITRYPSIN TOTAL: CPT

## 2022-08-18 PROCEDURE — 84466 ASSAY OF TRANSFERRIN: CPT

## 2022-08-18 PROCEDURE — 83540 ASSAY OF IRON: CPT

## 2022-08-18 PROCEDURE — 80053 COMPREHEN METABOLIC PANEL: CPT

## 2022-08-18 PROCEDURE — 86038 ANTINUCLEAR ANTIBODIES: CPT

## 2022-08-18 PROCEDURE — 82390 ASSAY OF CERULOPLASMIN: CPT

## 2022-08-18 PROCEDURE — 86015 ACTIN ANTIBODY EACH: CPT

## 2022-08-18 PROCEDURE — 82728 ASSAY OF FERRITIN: CPT

## 2022-08-18 PROCEDURE — 36415 COLL VENOUS BLD VENIPUNCTURE: CPT

## 2022-08-18 PROCEDURE — 86381 MITOCHONDRIAL ANTIBODY EACH: CPT

## 2022-08-19 LAB
ANA SER QL: NEGATIVE
MITOCHONDRIA M2 IGG SER-ACNC: <20 UNITS (ref 0–20)
SMA IGG SER-ACNC: 3 UNITS (ref 0–19)

## 2022-09-15 ENCOUNTER — OFFICE VISIT (OUTPATIENT)
Dept: GASTROENTEROLOGY | Facility: CLINIC | Age: 57
End: 2022-09-15

## 2022-09-15 VITALS
HEIGHT: 64 IN | HEART RATE: 68 BPM | SYSTOLIC BLOOD PRESSURE: 105 MMHG | BODY MASS INDEX: 23.9 KG/M2 | RESPIRATION RATE: 20 BRPM | WEIGHT: 140 LBS | TEMPERATURE: 97.8 F | DIASTOLIC BLOOD PRESSURE: 52 MMHG

## 2022-09-15 DIAGNOSIS — R74.8 ELEVATED LIVER ENZYMES: Primary | ICD-10-CM

## 2022-09-15 DIAGNOSIS — K21.9 GASTROESOPHAGEAL REFLUX DISEASE WITHOUT ESOPHAGITIS: ICD-10-CM

## 2022-09-15 DIAGNOSIS — R10.10 PAIN OF UPPER ABDOMEN: ICD-10-CM

## 2022-09-15 DIAGNOSIS — R11.0 NAUSEA: ICD-10-CM

## 2022-09-15 PROCEDURE — 99213 OFFICE O/P EST LOW 20 MIN: CPT | Performed by: INTERNAL MEDICINE

## 2022-09-15 RX ORDER — FLUTICASONE PROPIONATE 50 MCG
1 SPRAY, SUSPENSION (ML) NASAL DAILY
COMMUNITY
Start: 2022-08-22

## 2022-09-15 NOTE — PROGRESS NOTES
Follow Up Note     Date: 09/15/2022   Patient Name: Shilpa Vides  MRN: [id number]  : 1965     Referring Physician: Spring Lockett APRN    Chief Complaint:    Chief Complaint   Patient presents with   • Follow-up   • Elevated Hepatic Enzymes   • Nausea   • Heartburn   • Abdominal Pain     Upper abdomen       Interval History:   9/15/2022  Shilpa Vides is a 56 y.o. female who is here today for follow up for her elevated liver enzymes epigastric pain and nausea.  States that her nausea and epigastric pain has resolved now.    8/15/2022  Shilpa Vides is a 56 y.o. female who is here today to establish care with Gastroenterology for evaluation of her ongoing upper abdominal pain.     She states that she developed upper abdominal pain mainly in the epigastric region about 3 months ago in May 2022.  He was in the emergency room at that time and her blood work revealed elevated liver enzymes.  CT abdomen pelvis done also showed mild extrahepatic biliary dilatation.  She continued to have intermittent upper abdominal pain since then with the nausea without any vomiting.  She had a gallbladder removed many years ago for abdominal pain no gallstone as such as per patient.  She was recently seen by cardiology and her statins stopped due to her elevated liver enzymes.  Patient denies any prior history of liver abnormalities or liver issues.  No family history of liver cirrhosis.  Patient is nonalcoholic.     She does have a occasional reflux symptoms for which she takes Pepcid as needed. She denies odynophagia or dysphagia. Deny any hematochezia or melena. There is no prior history of liver or pancreatic disease. There is no history of anemia. Prior history of EGD many years ago details unknown., Last  Colonoscopy was in May 2021was normal by Dr Durham. No family history of colon cancer or any GI malignancy.  Denies alcohol abuse or cigarette smoking.       Subjective      Past Medical History:   Past  Medical History:   Diagnosis Date   • Back pain    • Cataract 05/20/2021   • Elevated cholesterol    • Frequent headaches    • Hyperlipidemia    • Hypertension    • PONV (postoperative nausea and vomiting)      Past Surgical History:   Past Surgical History:   Procedure Laterality Date   • CARDIAC CATHETERIZATION N/A 6/18/2021    Procedure: CORONARY ANGIOGRAPHY;  Surgeon: William Camejo IV, MD;  Location: [facility] CATH INVASIVE LOCATION;  Service: Cardiovascular;  Laterality: N/A;   • CATARACT EXTRACTION WITH INTRAOCULAR LENS IMPLANT Left 05/20/2021   • CHOLECYSTECTOMY     • COLONOSCOPY     • COLONOSCOPY N/A 5/21/2021    Procedure: COLONOSCOPY;  Surgeon: Lynn Durham MD;  Location: [facility] ENDOSCOPY;  Service: Gastroenterology;  Laterality: N/A;   • EYE SURGERY     • HYSTERECTOMY  2012       Family History:   Family History   Problem Relation Age of Onset   • Heart disease Mother    • Diabetes Mother    • Breast cancer Mother    • Heart disease Father    • Diabetes Father    • Colon cancer Neg Hx    • Esophageal cancer Neg Hx    • Liver cancer Neg Hx    • Liver disease Neg Hx    • Stomach cancer Neg Hx        Social History:   Social History     Socioeconomic History   • Marital status:    Tobacco Use   • Smoking status: Never Smoker   • Smokeless tobacco: Never Used   Vaping Use   • Vaping Use: Never used   Substance and Sexual Activity   • Alcohol use: No   • Drug use: No   • Sexual activity: Defer       Medications:     Current Outpatient Medications:   •  acetaminophen (Tylenol 8 Hour) 650 MG 8 hr tablet, Take 1 tablet by mouth Every 8 (Eight) Hours As Needed for Mild Pain ., Disp: 15 tablet, Rfl: 0  •  aspirin 81 MG EC tablet, Take 81 mg by mouth Daily. On hold this week., Disp: , Rfl:   •  cetirizine (zyrTEC) 10 MG tablet, Take 10 mg by mouth Daily., Disp: , Rfl:   •  Cholecalciferol (VITAMIN D) 2000 UNITS tablet, Take 2,000 Units by mouth Daily., Disp: , Rfl:   •  clobetasol (TEMOVATE) 0.05  % ointment, APPLY A DIME-SIZED AMOUNT TO THE AFFECTED AREA NIGHTLY FOR 6 WEEKS, THEN 1-3 TIMES PER WEEK THEREAFTER, Disp: 60 g, Rfl: 0  •  diazePAM (VALIUM) 5 MG tablet, Take 5 mg by mouth Daily As Needed., Disp: , Rfl:   •  dicyclomine (BENTYL) 20 MG tablet, Take 1 tablet by mouth 3 (Three) Times a Day As Needed (abdominal pain)., Disp: 30 tablet, Rfl: 1  •  fluticasone (FLONASE) 50 MCG/ACT nasal spray, 1 spray by Each Nare route Daily., Disp: , Rfl:   •  lansoprazole (PREVACID) 30 MG capsule, Take 1 capsule by mouth Daily., Disp: 90 capsule, Rfl: 1  •  lisinopril (PRINIVIL,ZESTRIL) 2.5 MG tablet, Take 2.5 mg by mouth Daily., Disp: , Rfl:   •  meclizine (ANTIVERT) 25 MG tablet, Take 1 tablet by mouth 4 (Four) Times a Day As Needed for dizziness., Disp: 30 tablet, Rfl: 0  •  metoprolol succinate XL (TOPROL-XL) 25 MG 24 hr tablet, Take 25 mg by mouth Daily., Disp: , Rfl:   •  ondansetron ODT (ZOFRAN-ODT) 4 MG disintegrating tablet, Place 1 tablet under the tongue Every 6 (Six) Hours As Needed for Nausea or Vomiting., Disp: 10 tablet, Rfl: 0  •  VITAMIN D PO, Take 2 tablets by mouth Daily., Disp: , Rfl:   •  amoxicillin (AMOXIL) 500 MG capsule, Take 1,000 mg by mouth 2 (Two) Times a Day., Disp: , Rfl:   •  estradiol (ESTRACE) 0.1 MG/GM vaginal cream, Insert 2 g into the vagina 3 (Three) Times a Week., Disp: 42.5 g, Rfl: 12  •  lisinopril (Zestril) 2.5 MG tablet, Take 1 tablet by mouth Daily, Disp: 30 tablet, Rfl: 0  •  metoprolol succinate XL (TOPROL-XL) 25 MG 24 hr tablet, Take 1/2 tablet by mouth Daily, Disp: 15 tablet, Rfl: 0  •  naproxen (NAPROSYN) 375 MG tablet, Take 1 tablet by mouth 2 (Two) Times a Day As Needed for Mild Pain ., Disp: 14 tablet, Rfl: 0  •  pravastatin (PRAVACHOL) 40 MG tablet, Take 40 mg by mouth daily., Disp: , Rfl:     Current Facility-Administered Medications:   •  meclizine (ANTIVERT) tablet 25 mg, 25 mg, Oral, TID PRN, Lucila Slaughter, APRN    Allergies:   Allergies   Allergen Reactions  "  • Flagyl [Metronidazole] Nausea And Vomiting       Review of Systems:   Review of Systems   Constitutional: Negative for appetite change, fatigue, fever and unexpected weight loss.   HENT: Negative for trouble swallowing.    Gastrointestinal: Positive for GERD. Negative for abdominal distention, abdominal pain, anal bleeding, blood in stool, constipation, diarrhea, rectal pain, vomiting and indigestion.       The following portions of the patient's history were reviewed and updated as appropriate: allergies, current medications, past family history, past medical history, past social history, past surgical history and problem list.    Objective     Physical Exam:  Vital Signs:   Vitals:    09/15/22 1328   BP: 105/52   Pulse: 68   Resp: 20   Temp: 97.8 °F (36.6 °C)   TempSrc: Infrared   Weight: 63.5 kg (140 lb)   Height: 162.6 cm (64\")       Physical Exam  Constitutional:       Appearance: Normal appearance.   HENT:      Head: Normocephalic and atraumatic.   Eyes:      Conjunctiva/sclera: Conjunctivae normal.   Abdominal:      General: Abdomen is flat. There is no distension.      Palpations: There is no mass.      Tenderness: There is no abdominal tenderness. There is no guarding or rebound.      Hernia: No hernia is present.   Musculoskeletal:      Cervical back: Normal range of motion and neck supple.   Neurological:      Mental Status: She is alert.         Results Review:   I reviewed the patient's new clinical results.    Lab on 08/18/2022   Component Date Value Ref Range Status   • Ferritin 08/18/2022 108.00  13.00 - 150.00 ng/mL Final   • Glucose 08/18/2022 80  65 - 99 mg/dL Final   • BUN 08/18/2022 12  6 - 20 mg/dL Final   • Creatinine 08/18/2022 0.74  0.57 - 1.00 mg/dL Final   • Sodium 08/18/2022 137  136 - 145 mmol/L Final   • Potassium 08/18/2022 4.3  3.5 - 5.2 mmol/L Final   • Chloride 08/18/2022 101  98 - 107 mmol/L Final   • CO2 08/18/2022 25.8  22.0 - 29.0 mmol/L Final   • Calcium 08/18/2022 9.5  8.6 " - 10.5 mg/dL Final   • Total Protein 08/18/2022 6.4  6.0 - 8.5 g/dL Final   • Albumin 08/18/2022 4.20  3.50 - 5.20 g/dL Final   • ALT (SGPT) 08/18/2022 8  1 - 33 U/L Final   • AST (SGOT) 08/18/2022 17  1 - 32 U/L Final   • Alkaline Phosphatase 08/18/2022 63  39 - 117 U/L Final   • Total Bilirubin 08/18/2022 0.3  0.0 - 1.2 mg/dL Final   • Globulin 08/18/2022 2.2  gm/dL Final   • A/G Ratio 08/18/2022 1.9  g/dL Final   • BUN/Creatinine Ratio 08/18/2022 16.2  7.0 - 25.0 Final   • Anion Gap 08/18/2022 10.2  5.0 - 15.0 mmol/L Final   • eGFR 08/18/2022 94.5  >60.0 mL/min/1.73 Final    National Kidney Foundation and American Society of Nephrology (ASN) Task Force recommended calculation based on the Chronic Kidney Disease Epidemiology Collaboration (CKD-EPI) equation refit without adjustment for race.   • NANCY Direct 08/18/2022 Negative  Negative Final   • Smooth Muscle Ab 08/18/2022 3  0 - 19 Units Final                     Negative                     0 - 19                   Weak positive               20 - 30                   Moderate to strong positive     >30   Actin Antibodies are found in 52-85% of patients with   autoimmune hepatitis or chronic active hepatitis and   in 22% of patients with primary biliary cirrhosis.   • Mitochondrial Ab 08/18/2022 <20.0  0.0 - 20.0 Units Final                                    Negative    0.0 - 20.0                                  Equivocal  20.1 - 24.9                                  Positive         >24.9  Mitochondrial (M2) Antibodies are found in 90-96% of  patients with primary biliary cirrhosis.   • Iron 08/18/2022 83  37 - 145 mcg/dL Final   • Iron Saturation 08/18/2022 20  20 - 50 % Final   • Transferrin 08/18/2022 284  200 - 360 mg/dL Final   • TIBC 08/18/2022 423  298 - 536 mcg/dL Final   • ALPHA -1 ANTITRYPSIN 08/18/2022 119  90 - 200 mg/dL Final   • Ceruloplasmin 08/18/2022 25  19 - 39 mg/dL Final          CT Abdomen Pelvis With Contrast     Result Date:  5/20/2022  1. Mild extrahepatic biliary ductal dilatation. MRCP may be considered. 2. Otherwise unremarkable.   This study was performed with techniques to keep radiation doses as low as reasonably achievable (ALARA). Individualized dose reduction techniques using automated exposure control or adjustment of vA and/or kV according to the patient size were employed.  This report was signed and finalized on 5/20/2022 2:21 PM by Joaquin Caal MD.    CT Abdomen With Contrast    Result Date: 8/10/2022  No evidence of acute intra-abdominal process. No significant interval change from the prior exam.  400.28 mGy.cm   This study was performed with techniques to keep radiation doses as low as reasonably achievable (ALARA). Individualized dose reduction techniques using automated exposure control or adjustment of mA and/or kV according to the patient size were employed.     Images were reviewed, interpreted, and dictated by PAVITHRA Guevara Transcribed by Edel Rogers PA-C.  This report was signed and finalized on 8/10/2022 1:06 PM by PAVITHRA Hermosillo.    MRI abdomen wo contrast mrcp    Result Date: 8/17/2022  Unremarkable MRCP.  This report was signed and finalized on 8/17/2022 8:28 AM by Joaquin Caal MD.      Assessment / Plan      1. Elevated LFTs  2. Suspected NSAID induced acute gastritis gastric erosion.    3. Nausea  4. Gastroesophageal reflux disease without esophagitis  9/15/2022  Repeat labs done on 8/18/2022 revealed a normalization of her liver enzymes.  This is more suggestive of recent possible atypical hepatotrophic viral infection causing elevated liver enzymes..  Acute hepatitis panel is negative.  Alpha-1 antitrypsin, NANCY, antimitochondrial antibody, anti-smooth muscle antibody negative.  Iron studies negative for hemochromatosis.  Ceruloplasmin level is normal.  MRCP done on 8/17/2022 was unremarkable for any choledocholithiasis or any hepatobiliary abnormality.  Patient was taking NSAIDs  and possibly had NSAID induced gastric erosion causing epigastric pain and nausea that has resolved now with the PPI.  Advised to discontinue Prevacid after 4 more weeks of treatment and to take only as needed on demand.  No further work-up needed for the elevated liver enzymes at this time.   We will repeat CBC CMP in 1 year time.    8/15/2022  Etiology of her abdominal pain and elevated liver enzymes is unclear.  No prior history of elevated liver enzymes except borderline elevated AST in 2021.  This time in May 2022 she was noted to have a hepatocellular pattern of elevated liver enzymes.  CT abdomen pelvis done with contrast on 05/20/2022 revealed mild extrahepatic biliary ductal dilatation.  Given her epigastric pain, there is a concern for choledocholithiasis however liver pattern is mostly hepatocellular she raises the possibility of autoimmune hepatitis or drug-induced liver injury.  No new medicines except patient was on a statin.  No history of any viral infections.  She had a prior gallbladder removed for abdominal pain patient is not sure about the gallstones.     Her liver enzymes in May 2022 for more than 8-10 times upper limit of normal.  Repeat labs done in July 2022 revealed improvement in the liver enzymes to 2-3 times upper limit of normal.  Acute hepatitis panel is negative.    Prior history  5. Encounter for screening for malignant neoplasm of colon  As per record last colonoscopy was in May 2021 by Dr. Carrasquillo and reported as normal.  Based on the record she needs repeat colonoscopy in 2031.       Follow Up:   No follow-ups on file.    Adrian Melton MD  [facility] [place]  9/15/2022  13:29 EDT     Please note that portions of this note may have been completed with a voice recognition program.

## 2022-09-22 ENCOUNTER — APPOINTMENT (OUTPATIENT)
Dept: MRI IMAGING | Facility: HOSPITAL | Age: 57
End: 2022-09-22

## 2022-11-02 ENCOUNTER — TRANSCRIBE ORDERS (OUTPATIENT)
Dept: ADMINISTRATIVE | Facility: HOSPITAL | Age: 57
End: 2022-11-02

## 2022-11-02 DIAGNOSIS — Z12.31 VISIT FOR SCREENING MAMMOGRAM: Primary | ICD-10-CM

## 2022-11-15 ENCOUNTER — HOSPITAL ENCOUNTER (OUTPATIENT)
Dept: MAMMOGRAPHY | Facility: HOSPITAL | Age: 57
Discharge: HOME | End: 2022-11-15
Admitting: NURSE PRACTITIONER

## 2022-11-15 DIAGNOSIS — Z12.31 VISIT FOR SCREENING MAMMOGRAM: ICD-10-CM

## 2022-11-15 PROCEDURE — 77067 SCR MAMMO BI INCL CAD: CPT

## 2022-11-15 PROCEDURE — 77063 BREAST TOMOSYNTHESIS BI: CPT

## 2022-11-16 ENCOUNTER — OFFICE VISIT (OUTPATIENT)
Dept: PRIMARY CARE CLINIC | Age: 57
End: 2022-11-16

## 2022-11-16 VITALS
DIASTOLIC BLOOD PRESSURE: 62 MMHG | OXYGEN SATURATION: 97 % | TEMPERATURE: 97.5 F | RESPIRATION RATE: 14 BRPM | HEART RATE: 68 BPM | SYSTOLIC BLOOD PRESSURE: 115 MMHG | WEIGHT: 143.4 LBS | BODY MASS INDEX: 24.48 KG/M2 | HEIGHT: 64 IN

## 2022-11-16 DIAGNOSIS — J30.9 ALLERGIC RHINITIS, UNSPECIFIED SEASONALITY, UNSPECIFIED TRIGGER: ICD-10-CM

## 2022-11-16 DIAGNOSIS — R42 VERTIGO: ICD-10-CM

## 2022-11-16 DIAGNOSIS — J01.00 ACUTE NON-RECURRENT MAXILLARY SINUSITIS: ICD-10-CM

## 2022-11-16 DIAGNOSIS — R51.9 CHRONIC INTRACTABLE HEADACHE, UNSPECIFIED HEADACHE TYPE: Primary | ICD-10-CM

## 2022-11-16 DIAGNOSIS — G89.29 CHRONIC INTRACTABLE HEADACHE, UNSPECIFIED HEADACHE TYPE: Primary | ICD-10-CM

## 2022-11-16 RX ORDER — LANSOPRAZOLE 30 MG/1
CAPSULE, DELAYED RELEASE ORAL
COMMUNITY
Start: 2022-08-15

## 2022-11-16 RX ORDER — METOPROLOL SUCCINATE 25 MG/1
TABLET, EXTENDED RELEASE ORAL
COMMUNITY
Start: 2022-09-19

## 2022-11-16 RX ORDER — TOPIRAMATE 25 MG/1
25 TABLET ORAL NIGHTLY
Qty: 60 TABLET | Refills: 3 | Status: SHIPPED | OUTPATIENT
Start: 2022-11-16

## 2022-11-16 RX ORDER — SUMATRIPTAN 25 MG/1
25 TABLET, FILM COATED ORAL
Qty: 27 TABLET | Refills: 1 | Status: SHIPPED | OUTPATIENT
Start: 2022-11-16 | End: 2022-11-16

## 2022-11-16 RX ORDER — LISINOPRIL 2.5 MG/1
TABLET ORAL
COMMUNITY
Start: 2022-09-19

## 2022-11-16 RX ORDER — AMOXICILLIN AND CLAVULANATE POTASSIUM 875; 125 MG/1; MG/1
1 TABLET, FILM COATED ORAL 2 TIMES DAILY
Qty: 14 TABLET | Refills: 0 | Status: SHIPPED | OUTPATIENT
Start: 2022-11-16 | End: 2022-11-23

## 2022-11-16 SDOH — ECONOMIC STABILITY: FOOD INSECURITY: WITHIN THE PAST 12 MONTHS, THE FOOD YOU BOUGHT JUST DIDN'T LAST AND YOU DIDN'T HAVE MONEY TO GET MORE.: NEVER TRUE

## 2022-11-16 SDOH — ECONOMIC STABILITY: FOOD INSECURITY: WITHIN THE PAST 12 MONTHS, YOU WORRIED THAT YOUR FOOD WOULD RUN OUT BEFORE YOU GOT MONEY TO BUY MORE.: NEVER TRUE

## 2022-11-16 ASSESSMENT — ENCOUNTER SYMPTOMS
RESPIRATORY NEGATIVE: 1
ALLERGIC/IMMUNOLOGIC NEGATIVE: 1
EYES NEGATIVE: 1
GASTROINTESTINAL NEGATIVE: 1
BACK PAIN: 1

## 2022-11-16 ASSESSMENT — PATIENT HEALTH QUESTIONNAIRE - PHQ9
SUM OF ALL RESPONSES TO PHQ QUESTIONS 1-9: 0
1. LITTLE INTEREST OR PLEASURE IN DOING THINGS: 0
SUM OF ALL RESPONSES TO PHQ QUESTIONS 1-9: 0
2. FEELING DOWN, DEPRESSED OR HOPELESS: 0
SUM OF ALL RESPONSES TO PHQ9 QUESTIONS 1 & 2: 0

## 2022-11-16 ASSESSMENT — SOCIAL DETERMINANTS OF HEALTH (SDOH): HOW HARD IS IT FOR YOU TO PAY FOR THE VERY BASICS LIKE FOOD, HOUSING, MEDICAL CARE, AND HEATING?: NOT HARD AT ALL

## 2022-11-16 NOTE — PROGRESS NOTES
Chief Complaint   Patient presents with    Headache    Dizziness       Have you seen any other physician or provider since your last visit no    Have you had any other diagnostic tests since your last visit? no    Have you changed or stopped any medications since your last visit? no          SUBJECTIVE:    Patient ID:Shruthi Brito is a 61 y.o. female. Chief Complaint   Patient presents with    Headache    Dizziness     HPI:  Patient reported headache for the past 20 years. Pain located temporal and radiates to the back of her head. Pain is 8/10. Worse at night, better with nothing. Sx are getting no better. Associated sx include numbness,tingling. Patient tried Advil and Tylenol with no response. Patient has had similar sx in the past.  She says she takes too much Advil   she takes Valuim to sleep. She is suppose to Dr. Richar Burrell in [place]. She has headaches daily chronically for years. She says she had a tsh done last week at [facility] office. She has had hyperthyroid in the past.   She has had back pain since she had her child 37 years ago. Patient also suffers from dizziness for several years and she takes Valium and Meclizine with some help. She has tingling and numbness in her face today. She was seen by a Neurologist with no relief but has an appointment with another neurologist next week. Patient's medications, allergies, past medical, surgical, social and family histories were reviewed and updated as appropriate. Review of Systems   Constitutional: Negative. HENT: Negative. Eyes: Negative. Respiratory: Negative. Cardiovascular: Negative. Gastrointestinal: Negative. Endocrine: Negative. Genitourinary: Negative. Musculoskeletal:  Positive for back pain and neck pain. Skin: Negative. Allergic/Immunologic: Negative. Neurological:  Positive for dizziness, numbness and headaches. Hematological: Negative. Psychiatric/Behavioral: Negative. OBJECTIVE:  /62 (Site: Left Upper Arm, Position: Sitting, Cuff Size: Medium Adult)   Pulse 68   Temp 97.5 °F (36.4 °C) (Temporal)   Resp 14   Ht 5' 4\" (1.626 m)   Wt 143 lb 6.4 oz (65 kg)   LMP  (LMP Unknown)   SpO2 97%   BMI 24.61 kg/m²    Physical Exam  HENT:      Nose: Congestion and rhinorrhea present. No results found for requested labs within last 30 days. Microscopic Examination (no units)   Date Value   03/23/2021 YES         No results found for: WBC, NEUTROABS, HGB, HCT, MCV, PLT    No results found for: TSH      ASSESSMENT/PLAN:     1. Chronic intractable headache, unspecified headache type    - topiramate (TOPAMAX) 25 MG tablet; Take 1 tablet by mouth nightly  Dispense: 60 tablet; Refill: 3  - SUMAtriptan (IMITREX) 25 MG tablet; Take 1 tablet by mouth once as needed for Migraine  Dispense: 27 tablet; Refill: 1    2. Vertigo    - External Referral To Allergy    3. Allergic rhinitis, unspecified seasonality, unspecified trigger    - External Referral To Allergy    4. Acute non-recurrent maxillary sinusitis    - amoxicillin-clavulanate (AUGMENTIN) 875-125 MG per tablet; Take 1 tablet by mouth 2 times daily for 7 days  Dispense: 14 tablet;  Refill: 0     Written by Caleb GOMEZ, acting as a scribe for Anjana Pepper on 11/16/2022 at 9:56 PM.

## 2022-11-16 NOTE — PATIENT INSTRUCTIONS
Keep a list of your medicines with you. List all of the prescription medicines, nonprescription medicines, supplements, natural remedies, and vitamins that you take. Tell your healthcare providers who treat you about all of the products you are taking. Your provider can provide you with a form to keep track of them. Just ask. Follow the directions that come with your medicine, including information about food or alcohol. Make sure you know how and when to take your medicine. Do not take more or less than you are supposed to take. Keep all medicines out of the reach of children. Store medicines according to the directions on the label. Monitor yourself. Learn to know how your body reacts to your new medicine and keep track of how it makes you feel before attempting (If your provider has allowed you to do so) to drive or go to work. Seek emergency medical attention if you think you have used too much of this medicine. An overdose of any prescription medicine can be fatal. Overdose symptoms may include extreme drowsiness, muscle weakness, confusion, cold and clammy skin, pinpoint pupils, shallow breathing, slow heart rate, fainting, or coma. Don't share prescription medicines with others, even when they seem to have the same symptoms. What may be good for you may be harmful to others. If you are no longer taking a prescribed medication and you have pills left please take your pills out of their original containers. Mix crushed pills with an undesirable substance, such as cat litter or used coffee grounds. Put the mixture into a disposable container with a lid, such as an empty margarine tub, or into a sealable bag. Cover up or remove any of your personal information on the empty containers by covering it with black permanent marker or duct tape. Place the sealed container with the mixture, and the empty drug containers, in the trash.    If you use a medication that is in the form of a patch, dispose of used patches by folding them in half so that the sticky sides meet, and then flushing them down a toilet. They should not be placed in the household trash where children or pets can find them. If you have any questions, ask your provider or pharmacist for more information. Be sure to keep all appointments for provider visits or tests. We are committed to providing you with the best care possible. In order to help us achieve these goals please remember to bring all medications, herbal products, and over the counter supplements with you to each visit. If your provider has ordered testing for you, please be sure to follow up with our office if you have not received results within 7 days after the testing took place. *If you receive a survey after visiting one of our offices, please take time to share your experience concerning your physician office visit. These surveys are confidential and no health information about you is shared. We are eager to improve for you and we are counting on your feedback to help make that happen. Thank you for enrolling in [facility]. Please follow the instructions below to securely access your online medical record. [employer] allows you to send messages to your doctor, view your test results, renew your prescriptions, schedule appointments, and more. How Do I Sign Up? In your Internet browser, go to [email or web address]  Click on the Sign Up Now link in the Sign In box. You will see the New Member Sign Up page. Enter your [employer] Access Code exactly as it appears below. You will not need to use this code after youve completed the sign-up process. If you do not sign up before the expiration date, you must request a new code. [employer] Access Code: [email or web address]  Expires: 12/31/2022  2:18 PM    Enter your Social Security Number ([id number]) and Date of Birth (mm/dd/yyyy) as indicated and click Submit. You will be taken to the next sign-up page.   Create a [employer] ID. This will be your [employer] login ID and cannot be changed, so think of one that is secure and easy to remember. Create a [employer] password. You can change your password at any time. Enter your Password Reset Question and Answer. This can be used at a later time if you forget your password. Enter your e-mail address. You will receive e-mail notification when new information is available in [address] Click Sign Up. You can now view your medical record. Additional Information  If you have questions, please contact your physician practice where you receive care. Remember, [employer] is NOT to be used for urgent needs. For medical emergencies, dial 911.

## 2023-01-20 NOTE — PROGRESS NOTES
"Patient: Shilpa Vides    YOB: 1965    Date: 01/23/2023    Primary Care Provider: Spring Lockett APRN    Chief Complaint   Patient presents with   • Abdominal Pain   • Vomiting     Nausea and vomitting       SUBJECTIVE:    History of present illness:  I saw the patient in the office today as a consultation for evaluation and treatment of \"upper abdominal\" pain with bouts of nausea and vomiting. Patient states it is a dull/burning pain in chest and throat. History of ulcers, gallbladder removed. Fatty and acidic foods worsen symptoms.    She has had a previous laparoscopic cholecystectomy in the past, she does give a history significant for reflux esophagitis and sharp epigastric abdominal discomfort with substernal burning multiple times per day.  It is relieved slightly with over-the-counter proton pump inhibitors, she does have a history significant for peptic ulcer disease in the past.    The following portions of the patient's history were reviewed and updated as appropriate: allergies, current medications, past family history, past medical history, past social history, past surgical history and problem list.    Review of Systems   Constitutional: Negative for chills, fever and unexpected weight change.   HENT: Negative for hearing loss, trouble swallowing and voice change.    Eyes: Negative for visual disturbance.   Respiratory: Negative for apnea, cough, chest tightness, shortness of breath and wheezing.    Cardiovascular: Negative for chest pain, palpitations and leg swelling.   Gastrointestinal: Positive for nausea and vomiting. Negative for abdominal distention, abdominal pain, anal bleeding, blood in stool, constipation, diarrhea and rectal pain.   Endocrine: Negative for cold intolerance and heat intolerance.   Genitourinary: Negative for difficulty urinating, dysuria and flank pain.   Musculoskeletal: Negative for back pain and gait problem.   Skin: Negative for color change, rash and " wound.   Neurological: Negative for dizziness, syncope, speech difficulty, weakness, light-headedness, numbness and headaches.   Hematological: Negative for adenopathy. Does not bruise/bleed easily.   Psychiatric/Behavioral: Negative for confusion. The patient is not nervous/anxious.        History:  Past Medical History:   Diagnosis Date   • Back pain    • Cataract 05/20/2021   • Elevated cholesterol    • Frequent headaches    • Hyperlipidemia    • Hypertension    • PONV (postoperative nausea and vomiting)        Past Surgical History:   Procedure Laterality Date   • CARDIAC CATHETERIZATION N/A 06/18/2021    Procedure: CORONARY ANGIOGRAPHY;  Surgeon: William Camejo IV, MD;  Location: [facility] CATH INVASIVE LOCATION;  Service: Cardiovascular;  Laterality: N/A;   • CATARACT EXTRACTION WITH INTRAOCULAR LENS IMPLANT Left 05/20/2021   • CHOLECYSTECTOMY     • COLONOSCOPY     • COLONOSCOPY N/A 05/21/2021    Procedure: COLONOSCOPY;  Surgeon: Lynn Durham MD;  Location: [facility] ENDOSCOPY;  Service: Gastroenterology;  Laterality: N/A;   • EYE SURGERY     • HYSTERECTOMY  2012   • OOPHORECTOMY         Family History   Problem Relation Age of Onset   • Heart disease Mother    • Diabetes Mother    • Breast cancer Mother    • Heart disease Father    • Diabetes Father    • Colon cancer Neg Hx    • Esophageal cancer Neg Hx    • Liver cancer Neg Hx    • Liver disease Neg Hx    • Stomach cancer Neg Hx        Social History     Tobacco Use   • Smoking status: Never   • Smokeless tobacco: Never   Vaping Use   • Vaping Use: Never used   Substance Use Topics   • Alcohol use: No   • Drug use: No       Allergies:  Allergies   Allergen Reactions   • Flagyl [Metronidazole] Nausea And Vomiting       Medications:    Current Outpatient Medications:   •  acetaminophen (Tylenol 8 Hour) 650 MG 8 hr tablet, Take 1 tablet by mouth Every 8 (Eight) Hours As Needed for Mild Pain ., Disp: 15 tablet, Rfl: 0  •  amoxicillin (AMOXIL) 500 MG  tablet, Take 500 mg by mouth 2 (Two) Times a Day. for 10 days, Disp: , Rfl:   •  aspirin 81 MG EC tablet, Take 81 mg by mouth Daily. On hold this week., Disp: , Rfl:   •  cetirizine (zyrTEC) 10 MG tablet, Take 10 mg by mouth Daily., Disp: , Rfl:   •  Cholecalciferol (VITAMIN D) 2000 UNITS tablet, Take 2,000 Units by mouth Daily., Disp: , Rfl:   •  clobetasol (TEMOVATE) 0.05 % ointment, APPLY A DIME-SIZED AMOUNT TO THE AFFECTED AREA NIGHTLY FOR 6 WEEKS, THEN 1-3 TIMES PER WEEK THEREAFTER, Disp: 60 g, Rfl: 0  •  diazePAM (VALIUM) 5 MG tablet, Take 5 mg by mouth Daily As Needed., Disp: , Rfl:   •  fluticasone (FLONASE) 50 MCG/ACT nasal spray, 1 spray by Each Nare route Daily., Disp: , Rfl:   •  lansoprazole (PREVACID) 30 MG capsule, Take 1 capsule by mouth Daily., Disp: 90 capsule, Rfl: 1  •  lisinopril (PRINIVIL,ZESTRIL) 2.5 MG tablet, Take 2.5 mg by mouth Daily., Disp: , Rfl:   •  meclizine (ANTIVERT) 25 MG tablet, Take 1 tablet by mouth 4 (Four) Times a Day As Needed for dizziness., Disp: 30 tablet, Rfl: 0  •  metoprolol succinate XL (TOPROL-XL) 25 MG 24 hr tablet, Take 25 mg by mouth Daily., Disp: , Rfl:   •  ondansetron ODT (ZOFRAN-ODT) 4 MG disintegrating tablet, Place 1 tablet under the tongue Every 6 (Six) Hours As Needed for Nausea or Vomiting., Disp: 10 tablet, Rfl: 0  •  pravastatin (PRAVACHOL) 20 MG tablet, Take 20 mg by mouth Daily., Disp: , Rfl:   •  SUMAtriptan (IMITREX) 25 MG tablet, Take 1 tablet by mouth 1 (One) Time As Needed., Disp: , Rfl:   •  Aimovig 70 MG/ML auto-injector, , Disp: , Rfl:   •  azithromycin (ZITHROMAX) 250 MG tablet, Take  by mouth See Admin Instructions. Take 2 tablets by mouth on day 1 then take 1 tablet by mouth once daily on days 2-5, Disp: , Rfl:   •  dicyclomine (BENTYL) 20 MG tablet, Take 1 tablet by mouth 3 (Three) Times a Day As Needed (abdominal pain)., Disp: 30 tablet, Rfl: 1  •  lisinopril (Zestril) 2.5 MG tablet, Take 1 tablet by mouth Daily, Disp: 30 tablet, Rfl: 0  •  " metoprolol succinate XL (TOPROL-XL) 25 MG 24 hr tablet, Take 1/2 tablet by mouth Daily, Disp: 15 tablet, Rfl: 0  •  topiramate (TOPAMAX) 25 MG tablet, Take 25 mg by mouth Every Night., Disp: , Rfl:     Current Facility-Administered Medications:   •  meclizine (ANTIVERT) tablet 25 mg, 25 mg, Oral, TID PRN, Lucila Slaughter, APRN    OBJECTIVE:    Vital Signs:   Vitals:    01/23/23 0948   BP: 98/68   BP Location: Left arm   Patient Position: Sitting   Cuff Size: Adult   Pulse: 73   Temp: 97.3 °F (36.3 °C)   TempSrc: Temporal   SpO2: 98%   Weight: 64.4 kg (142 lb)   Height: 162.6 cm (64\")       Physical Exam:   General Appearance:    Alert, cooperative, in no acute distress   Head:    Normocephalic, without obvious abnormality, atraumatic   Eyes:            Lids and lashes normal, conjunctivae and sclerae normal, no   icterus, no pallor, corneas clear, PERRLA   Ears:    Ears appear intact with no abnormalities noted   Throat:   No oral lesions, no thrush, oral mucosa moist   Neck:   No adenopathy, supple, trachea midline, no thyromegaly, no   carotid bruit, no JVD   Lungs:     Clear to auscultation,respirations regular, even and                  unlabored    Heart:    Regular rhythm and normal rate, normal S1 and S2, no            murmur, no gallop, no rub, no click   Chest Wall:    No abnormalities observed   Abdomen:     Normal bowel sounds, no masses, no organomegaly, soft        non-tender, non-distended, no guarding, there is evidence of epigastric  tenderness   Extremities:   Moves all extremities well, no edema, no cyanosis, no             redness   Pulses:   Pulses palpable and equal bilaterally   Skin:   No bleeding, bruising or rash   Lymph nodes:   No palpable adenopathy   Neurologic:   Cranial nerves 2 - 12 grossly intact, sensation intact     Results Review:   I reviewed the patient's new clinical results.  I reviewed the patient's new imaging results and agree with the interpretation.  I reviewed the " patient's other test results and agree with the interpretation    Review of Systems was reviewed and confirmed as accurate as documented by the MA.    ASSESSMENT/PLAN:    1. Epigastric pain    2. Right upper quadrant abdominal pain        I did have a detailed and extensive discussion with the patient in the office and they understand that they need to undergo upper endoscopy. Full risks and benefits of operative versus nonoperative intervention were discussed with the patient and these include bleeding and esophageal injury. The patient understands, agrees, and wishes to proceed with the surgical treatment plan as mentioned above. The patient had no questions for me at the end of the discussion.       I discussed the patients findings and my recommendations with patient.     Electronically signed by Jeromy Magallanes MD  01/23/23 09:56 EST

## 2023-01-23 ENCOUNTER — PATIENT ROUNDING (BHMG ONLY) (OUTPATIENT)
Dept: SURGERY | Facility: CLINIC | Age: 58
End: 2023-01-23
Payer: MEDICAID

## 2023-01-23 ENCOUNTER — OFFICE VISIT (OUTPATIENT)
Dept: SURGERY | Facility: CLINIC | Age: 58
End: 2023-01-23
Payer: MEDICAID

## 2023-01-23 VITALS
DIASTOLIC BLOOD PRESSURE: 68 MMHG | TEMPERATURE: 97.3 F | SYSTOLIC BLOOD PRESSURE: 98 MMHG | HEIGHT: 64 IN | HEART RATE: 73 BPM | WEIGHT: 142 LBS | OXYGEN SATURATION: 98 % | BODY MASS INDEX: 24.24 KG/M2

## 2023-01-23 DIAGNOSIS — R10.13 EPIGASTRIC PAIN: Primary | ICD-10-CM

## 2023-01-23 DIAGNOSIS — R10.11 RIGHT UPPER QUADRANT ABDOMINAL PAIN: ICD-10-CM

## 2023-01-23 PROCEDURE — 99214 OFFICE O/P EST MOD 30 MIN: CPT | Performed by: SURGERY

## 2023-01-23 RX ORDER — SUMATRIPTAN 25 MG/1
1 TABLET, FILM COATED ORAL ONCE AS NEEDED
COMMUNITY
Start: 2022-11-16

## 2023-01-23 RX ORDER — AZITHROMYCIN 250 MG/1
TABLET, FILM COATED ORAL SEE ADMIN INSTRUCTIONS
COMMUNITY
Start: 2022-12-21

## 2023-01-23 RX ORDER — TOPIRAMATE 25 MG/1
25 TABLET ORAL NIGHTLY
COMMUNITY
Start: 2022-11-16

## 2023-01-23 RX ORDER — PRAVASTATIN SODIUM 20 MG
20 TABLET ORAL DAILY
COMMUNITY
Start: 2022-12-09

## 2023-01-23 RX ORDER — ERENUMAB-AOOE 70 MG/ML
INJECTION SUBCUTANEOUS
COMMUNITY
Start: 2022-12-01

## 2023-01-23 RX ORDER — AMOXICILLIN 500 MG/1
500 TABLET, FILM COATED ORAL 2 TIMES DAILY
COMMUNITY
Start: 2022-12-28

## 2023-01-23 NOTE — PROGRESS NOTES
January 23, 2023    Hello, may I speak with Shilpa Vides?    My name is Norma Anthony    I am  with [facility] [address]  [facility] [place] [address] [phone].    Before we get started may I verify your date of birth? 1965    I am calling to officially welcome you to our practice and ask about your recent visit. Is this a good time to talk? yes    Tell me about your visit with us. What things went well?  everything was good        We're always looking for ways to make our patients' experiences even better. Do you have recommendations on ways we may improve?  no    Overall were you satisfied with your first visit to our practice? yes       I appreciate you taking the time to speak with me today. Is there anything else I can do for you? no      Thank you, and have a great day.

## 2023-01-31 ENCOUNTER — TELEPHONE (OUTPATIENT)
Dept: SURGERY | Facility: CLINIC | Age: 58
End: 2023-01-31
Payer: MEDICAID

## 2023-02-03 ENCOUNTER — TELEPHONE (OUTPATIENT)
Dept: SURGERY | Facility: CLINIC | Age: 58
End: 2023-02-03

## 2023-02-03 NOTE — TELEPHONE ENCOUNTER
Caller: RUMA COYLE   Relationship: SELF   Best call back number: [phone]    What is the best time to reach you: ASAP     Who are you requesting to speak with (clinical staff, provider,  specific staff member): SURGERY SCHEDULER     What was the call regarding: PT SCHEDULED FOR AN EGD THIS MORNING(02.03.23) W/ DR. SOLOMON.  PT IS FEELING SICK AND WOULD LIKE TO R/S ASAP.     Do you require a callback: YES

## 2023-02-07 ENCOUNTER — TELEPHONE (OUTPATIENT)
Dept: SURGERY | Facility: CLINIC | Age: 58
End: 2023-02-07
Payer: MEDICAID

## 2023-02-10 ENCOUNTER — OUTSIDE FACILITY SERVICE (OUTPATIENT)
Dept: SURGERY | Facility: CLINIC | Age: 58
End: 2023-02-10
Payer: MEDICAID

## 2023-02-10 PROCEDURE — 43239 EGD BIOPSY SINGLE/MULTIPLE: CPT | Performed by: SURGERY

## 2023-05-03 ENCOUNTER — HOSPITAL ENCOUNTER (EMERGENCY)
Facility: HOSPITAL | Age: 58
Discharge: HOME | End: 2023-05-03
Attending: STUDENT IN AN ORGANIZED HEALTH CARE EDUCATION/TRAINING PROGRAM
Payer: MEDICAID

## 2023-05-03 VITALS
BODY MASS INDEX: 23.9 KG/M2 | TEMPERATURE: 98 F | DIASTOLIC BLOOD PRESSURE: 61 MMHG | WEIGHT: 140 LBS | HEART RATE: 70 BPM | SYSTOLIC BLOOD PRESSURE: 122 MMHG | OXYGEN SATURATION: 97 % | RESPIRATION RATE: 18 BRPM | HEIGHT: 64 IN

## 2023-05-03 DIAGNOSIS — J06.9 VIRAL URI: Primary | ICD-10-CM

## 2023-05-03 LAB
ALBUMIN SERPL-MCNC: 4.3 G/DL (ref 3.5–5.2)
ALBUMIN/GLOB SERPL: 1.6 G/DL
ALP SERPL-CCNC: 73 U/L (ref 39–117)
ALT SERPL W P-5'-P-CCNC: 11 U/L (ref 1–33)
ANION GAP SERPL CALCULATED.3IONS-SCNC: 11.1 MMOL/L (ref 5–15)
AST SERPL-CCNC: 17 U/L (ref 1–32)
BASOPHILS # BLD AUTO: 0.06 10*3/MM3 (ref 0–0.2)
BASOPHILS NFR BLD AUTO: 0.8 % (ref 0–1.5)
BILIRUB SERPL-MCNC: 0.2 MG/DL (ref 0–1.2)
BILIRUB UR QL STRIP: NEGATIVE
BUN SERPL-MCNC: 13 MG/DL (ref 6–20)
BUN/CREAT SERPL: 16.9 (ref 7–25)
CALCIUM SPEC-SCNC: 9.6 MG/DL (ref 8.6–10.5)
CHLORIDE SERPL-SCNC: 104 MMOL/L (ref 98–107)
CLARITY UR: ABNORMAL
CO2 SERPL-SCNC: 23.9 MMOL/L (ref 22–29)
COLOR UR: YELLOW
CREAT SERPL-MCNC: 0.77 MG/DL (ref 0.57–1)
CRP SERPL-MCNC: <0.3 MG/DL (ref 0–0.5)
DEPRECATED RDW RBC AUTO: 41.2 FL (ref 37–54)
EGFRCR SERPLBLD CKD-EPI 2021: 89.5 ML/MIN/1.73
EOSINOPHIL # BLD AUTO: 0.19 10*3/MM3 (ref 0–0.4)
EOSINOPHIL NFR BLD AUTO: 2.4 % (ref 0.3–6.2)
ERYTHROCYTE [DISTWIDTH] IN BLOOD BY AUTOMATED COUNT: 12.6 % (ref 12.3–15.4)
FLUAV RNA RESP QL NAA+PROBE: NOT DETECTED
FLUBV RNA RESP QL NAA+PROBE: NOT DETECTED
GLOBULIN UR ELPH-MCNC: 2.7 GM/DL
GLUCOSE SERPL-MCNC: 85 MG/DL (ref 65–99)
GLUCOSE UR STRIP-MCNC: NEGATIVE MG/DL
HCT VFR BLD AUTO: 42.4 % (ref 34–46.6)
HGB BLD-MCNC: 14.2 G/DL (ref 12–15.9)
HGB UR QL STRIP.AUTO: NEGATIVE
HOLD SPECIMEN: NORMAL
IMM GRANULOCYTES # BLD AUTO: 0.02 10*3/MM3 (ref 0–0.05)
IMM GRANULOCYTES NFR BLD AUTO: 0.3 % (ref 0–0.5)
KETONES UR QL STRIP: NEGATIVE
LEUKOCYTE ESTERASE UR QL STRIP.AUTO: NEGATIVE
LIPASE SERPL-CCNC: 24 U/L (ref 13–60)
LYMPHOCYTES # BLD AUTO: 2.79 10*3/MM3 (ref 0.7–3.1)
LYMPHOCYTES NFR BLD AUTO: 35.3 % (ref 19.6–45.3)
MCH RBC QN AUTO: 29.8 PG (ref 26.6–33)
MCHC RBC AUTO-ENTMCNC: 33.5 G/DL (ref 31.5–35.7)
MCV RBC AUTO: 88.9 FL (ref 79–97)
MONOCYTES # BLD AUTO: 0.67 10*3/MM3 (ref 0.1–0.9)
MONOCYTES NFR BLD AUTO: 8.5 % (ref 5–12)
NEUTROPHILS NFR BLD AUTO: 4.18 10*3/MM3 (ref 1.7–7)
NEUTROPHILS NFR BLD AUTO: 52.7 % (ref 42.7–76)
NITRITE UR QL STRIP: NEGATIVE
NRBC BLD AUTO-RTO: 0 /100 WBC (ref 0–0.2)
PH UR STRIP.AUTO: 7.5 [PH] (ref 5–8)
PLATELET # BLD AUTO: 236 10*3/MM3 (ref 140–450)
PMV BLD AUTO: 9.3 FL (ref 6–12)
POTASSIUM SERPL-SCNC: 4.4 MMOL/L (ref 3.5–5.2)
PROT SERPL-MCNC: 7 G/DL (ref 6–8.5)
PROT UR QL STRIP: NEGATIVE
RBC # BLD AUTO: 4.77 10*6/MM3 (ref 3.77–5.28)
SARS-COV-2 RNA RESP QL NAA+PROBE: NOT DETECTED
SODIUM SERPL-SCNC: 139 MMOL/L (ref 136–145)
SP GR UR STRIP: 1.02 (ref 1–1.03)
UROBILINOGEN UR QL STRIP: ABNORMAL
WBC NRBC COR # BLD: 7.91 10*3/MM3 (ref 3.4–10.8)
WHOLE BLOOD HOLD COAG: NORMAL
WHOLE BLOOD HOLD SPECIMEN: NORMAL

## 2023-05-03 PROCEDURE — 85025 COMPLETE CBC W/AUTO DIFF WBC: CPT | Performed by: STUDENT IN AN ORGANIZED HEALTH CARE EDUCATION/TRAINING PROGRAM

## 2023-05-03 PROCEDURE — 80053 COMPREHEN METABOLIC PANEL: CPT | Performed by: STUDENT IN AN ORGANIZED HEALTH CARE EDUCATION/TRAINING PROGRAM

## 2023-05-03 PROCEDURE — 81003 URINALYSIS AUTO W/O SCOPE: CPT | Performed by: STUDENT IN AN ORGANIZED HEALTH CARE EDUCATION/TRAINING PROGRAM

## 2023-05-03 PROCEDURE — 83690 ASSAY OF LIPASE: CPT | Performed by: STUDENT IN AN ORGANIZED HEALTH CARE EDUCATION/TRAINING PROGRAM

## 2023-05-03 PROCEDURE — 87636 SARSCOV2 & INF A&B AMP PRB: CPT | Performed by: STUDENT IN AN ORGANIZED HEALTH CARE EDUCATION/TRAINING PROGRAM

## 2023-05-03 PROCEDURE — 86140 C-REACTIVE PROTEIN: CPT | Performed by: STUDENT IN AN ORGANIZED HEALTH CARE EDUCATION/TRAINING PROGRAM

## 2023-05-03 PROCEDURE — 99283 EMERGENCY DEPT VISIT LOW MDM: CPT

## 2023-05-03 RX ORDER — ONDANSETRON 4 MG/1
4 TABLET, ORALLY DISINTEGRATING ORAL EVERY 8 HOURS PRN
Qty: 20 TABLET | Refills: 0 | Status: SHIPPED | OUTPATIENT
Start: 2023-05-03

## 2023-05-03 RX ORDER — SODIUM CHLORIDE 0.9 % (FLUSH) 0.9 %
10 SYRINGE (ML) INJECTION AS NEEDED
Status: DISCONTINUED | OUTPATIENT
Start: 2023-05-03 | End: 2023-05-03 | Stop reason: HOSPADM

## 2023-05-03 NOTE — Clinical Note
[facility] EMERGENCY DEPARTMENT  [address] [address]  Phone: [phone]    Shilpa Vides was seen and treated in our emergency department on 5/3/2023.  She may return to work on 05/04/2023.         Thank you for choosing [facility].    Nikhil Shane MD

## 2023-05-03 NOTE — DISCHARGE INSTRUCTIONS
You were evaluated for myalgias and urinary symptoms.  We got labs and tested your urine.  This was negative for any acute process.  We believe that your symptoms are likely due to a viral illness.  You are now stable for discharge.  For your nausea we have sent a prescription for Zofran to your pharmacy.  Please take this as needed for nausea.  It important you continue to stay hydrated to prevent damage to your kidneys.  Would recommend that you follow-up with your primary care doctor to ensure that you improve appropriately.  You are now stable for discharge.

## 2023-05-03 NOTE — ED PROVIDER NOTES
Subjective  History of Present Illness:    Patient's 57-year-old female with history of hypertension, hyperlipidemia who presents today with myalgias, urinary symptoms, headaches.  Patient reports recently treated for UTI.  States that over the last few days, has been experiencing the above symptoms.  Concerned she could be having recurrence of her UTI now presents to our emergency department for evaluation.  She denies any vomiting but does endorse intermittent nausea.  No chest pain or shortness of breath.  Denies any abdominal pain, change in bowel habits.  Endorses frequency and intermittent dysuria.  Denies any unilateral leg swelling or leg pain.  No preceding fever or chills.      Nurses Notes reviewed and agree, including vitals, allergies, social history and prior medical history.     REVIEW OF SYSTEMS: All systems reviewed and not pertinent unless noted.  Review of Systems   Constitutional: Positive for activity change, appetite change and fatigue. Negative for chills and fever.   HENT: Negative for rhinorrhea, sinus pressure and sinus pain.    Eyes: Negative for discharge and itching.   Respiratory: Negative for cough and shortness of breath.    Cardiovascular: Negative for chest pain and leg swelling.   Gastrointestinal: Negative for abdominal distention, abdominal pain, nausea and vomiting.   Endocrine: Negative for cold intolerance and heat intolerance.   Genitourinary: Negative for decreased urine volume, difficulty urinating, flank pain, frequency, urgency, vaginal bleeding, vaginal discharge and vaginal pain.   Musculoskeletal: Positive for back pain and myalgias. Negative for arthralgias, gait problem, neck pain and neck stiffness.   Skin: Negative for color change.   Allergic/Immunologic: Negative for environmental allergies.   Neurological: Negative for seizures, syncope, facial asymmetry and speech difficulty.   Psychiatric/Behavioral: Negative for self-injury and suicidal ideas.       Past  "Medical History:   Diagnosis Date   • Back pain    • Cataract 05/20/2021   • Elevated cholesterol    • Frequent headaches    • Hyperlipidemia    • Hypertension    • PONV (postoperative nausea and vomiting)        Allergies:    Flagyl [metronidazole]      Past Surgical History:   Procedure Laterality Date   • CARDIAC CATHETERIZATION N/A 06/18/2021    Procedure: CORONARY ANGIOGRAPHY;  Surgeon: William Camejo IV, MD;  Location: [facility] CATH INVASIVE LOCATION;  Service: Cardiovascular;  Laterality: N/A;   • CATARACT EXTRACTION WITH INTRAOCULAR LENS IMPLANT Left 05/20/2021   • CHOLECYSTECTOMY     • COLONOSCOPY     • COLONOSCOPY N/A 05/21/2021    Procedure: COLONOSCOPY;  Surgeon: Lynn Durham MD;  Location: [facility] ENDOSCOPY;  Service: Gastroenterology;  Laterality: N/A;   • EYE SURGERY     • HYSTERECTOMY  2012   • OOPHORECTOMY           Social History     Socioeconomic History   • Marital status:    Tobacco Use   • Smoking status: Never   • Smokeless tobacco: Never   Vaping Use   • Vaping Use: Never used   Substance and Sexual Activity   • Alcohol use: No   • Drug use: No   • Sexual activity: Defer         Family History   Problem Relation Age of Onset   • Heart disease Mother    • Diabetes Mother    • Breast cancer Mother    • Heart disease Father    • Diabetes Father    • Colon cancer Neg Hx    • Esophageal cancer Neg Hx    • Liver cancer Neg Hx    • Liver disease Neg Hx    • Stomach cancer Neg Hx        Objective  Physical Exam:  /61 (BP Location: Left arm, Patient Position: Sitting)   Pulse 70   Temp 98 °F (36.7 °C) (Oral)   Resp 18   Ht 162.6 cm (64\")   Wt 63.5 kg (140 lb)   LMP  (LMP Unknown)   SpO2 97%   BMI 24.03 kg/m²      Physical Exam  Constitutional:       General: She is not in acute distress.     Appearance: Normal appearance. She is not ill-appearing.   HENT:      Head: Normocephalic and atraumatic.      Nose: Nose normal. No congestion or rhinorrhea.      Mouth/Throat:    "   Mouth: Mucous membranes are dry.      Pharynx: Oropharynx is clear. No oropharyngeal exudate or posterior oropharyngeal erythema.   Eyes:      Extraocular Movements: Extraocular movements intact.      Conjunctiva/sclera: Conjunctivae normal.      Pupils: Pupils are equal, round, and reactive to light.   Cardiovascular:      Rate and Rhythm: Normal rate and regular rhythm.      Pulses: Normal pulses.      Heart sounds: Normal heart sounds.   Pulmonary:      Effort: Pulmonary effort is normal. No respiratory distress.      Breath sounds: Normal breath sounds.   Abdominal:      General: Abdomen is flat. Bowel sounds are normal. There is no distension.      Palpations: Abdomen is soft.      Tenderness: There is no abdominal tenderness. There is no right CVA tenderness or left CVA tenderness.   Musculoskeletal:         General: No swelling or tenderness. Normal range of motion.      Cervical back: Normal range of motion and neck supple.   Skin:     General: Skin is warm and dry.      Capillary Refill: Capillary refill takes less than 2 seconds.   Neurological:      General: No focal deficit present.      Mental Status: She is alert and oriented to person, place, and time. Mental status is at baseline.      Cranial Nerves: No cranial nerve deficit.      Sensory: No sensory deficit.      Motor: No weakness.      Coordination: Coordination normal.   Psychiatric:         Mood and Affect: Mood normal.         Behavior: Behavior normal.         Thought Content: Thought content normal.         Judgment: Judgment normal.         Procedures    ED Course:         Lab Results (last 24 hours)     Procedure Component Value Units Date/Time    CBC & Differential [[id number]]  (Normal) Collected: 05/03/23 1529    Specimen: Blood Updated: 05/03/23 1536    Narrative:      The following orders were created for panel order CBC & Differential.  Procedure                               Abnormality         Status                     ---------                                -----------         ------                     CBC Auto Differential[[id number]]        Normal              Final result                 Please view results for these tests on the individual orders.    Comprehensive Metabolic Panel [[id number]] Collected: 05/03/23 1529    Specimen: Blood Updated: 05/03/23 1609     Glucose 85 mg/dL      BUN 13 mg/dL      Creatinine 0.77 mg/dL      Sodium 139 mmol/L      Potassium 4.4 mmol/L      Comment: Slight hemolysis detected by analyzer. Results may be affected.        Chloride 104 mmol/L      CO2 23.9 mmol/L      Calcium 9.6 mg/dL      Total Protein 7.0 g/dL      Albumin 4.3 g/dL      ALT (SGPT) 11 U/L      AST (SGOT) 17 U/L      Comment: Slight hemolysis detected by analyzer. Results may be affected.        Alkaline Phosphatase 73 U/L      Total Bilirubin 0.2 mg/dL      Globulin 2.7 gm/dL      A/G Ratio 1.6 g/dL      BUN/Creatinine Ratio 16.9     Anion Gap 11.1 mmol/L      eGFR 89.5 mL/min/1.73     Narrative:      GFR Normal >60  Chronic Kidney Disease <60  Kidney Failure <15      Lipase [[id number]]  (Normal) Collected: 05/03/23 1529    Specimen: Blood Updated: 05/03/23 1552     Lipase 24 U/L     CBC Auto Differential [[id number]]  (Normal) Collected: 05/03/23 1529    Specimen: Blood Updated: 05/03/23 1536     WBC 7.91 10*3/mm3      RBC 4.77 10*6/mm3      Hemoglobin 14.2 g/dL      Hematocrit 42.4 %      MCV 88.9 fL      MCH 29.8 pg      MCHC 33.5 g/dL      RDW 12.6 %      RDW-SD 41.2 fl      MPV 9.3 fL      Platelets 236 10*3/mm3      Neutrophil % 52.7 %      Lymphocyte % 35.3 %      Monocyte % 8.5 %      Eosinophil % 2.4 %      Basophil % 0.8 %      Immature Grans % 0.3 %      Neutrophils, Absolute 4.18 10*3/mm3      Lymphocytes, Absolute 2.79 10*3/mm3      Monocytes, Absolute 0.67 10*3/mm3      Eosinophils, Absolute 0.19 10*3/mm3      Basophils, Absolute 0.06 10*3/mm3      Immature Grans, Absolute 0.02 10*3/mm3      nRBC 0.0 /100 WBC     C-reactive  Protein [[id number]]  (Normal) Collected: 05/03/23 1529    Specimen: Blood Updated: 05/03/23 1555     C-Reactive Protein <0.30 mg/dL     Urinalysis With Microscopic If Indicated (No Culture) - Urine, Clean Catch [[id number]]  (Abnormal) Collected: 05/03/23 1540    Specimen: Urine, Clean Catch Updated: 05/03/23 1550     Color, UA Yellow     Appearance, UA Cloudy     pH, UA 7.5     Specific Gravity, UA 1.017     Glucose, UA Negative     Ketones, UA Negative     Bilirubin, UA Negative     Blood, UA Negative     Protein, UA Negative     Leuk Esterase, UA Negative     Nitrite, UA Negative     Urobilinogen, UA 0.2 E.U./dL    Narrative:      Urine microscopic not indicated.    COVID-19 and FLU A/B PCR - Swab, Nasopharynx [[id number]]  (Normal) Collected: 05/03/23 1553    Specimen: Swab from Nasopharynx Updated: 05/03/23 1634     COVID19 Not Detected     Influenza A PCR Not Detected     Influenza B PCR Not Detected    Narrative:      Fact sheet for providers: https://www.fda.gov/media/154223/download    Fact sheet for patients: https://www.fda.gov/media/604361/download    Test performed by PCR.           No radiology results from the last 24 hrs       MDM    Initial impression of presenting illness: Myalgias, urinary frequency, headache    DDX: includes but is not limited to: UTI, viral URI, pyelonephritis    Patient arrives stable with vitals interpreted by myself.     Pertinent features from physical exam: Patient cleared auscultation, unreactive to abdominal or back palpation.    Initial diagnostic plan: CBC, CMP, CRP, lactic acid, UA, COVID and flu swab    Results from initial plan were reviewed and interpreted by me revealing no concern for urinary tract infection on UA    Diagnostic information from other sources: Reviewed past medical records    Interventions / Re-evaluation: Observed the emergency department for over an hour with no change in vital signs    Results/clinical rationale were discussed with patient at  bedside    Consultations/Discussion of results with other physicians: Discussed likely diagnosis of viral URI given patient's symptoms of myalgias.  Discussed negative urine.  Encourage follow-up with PCP to ensure adequate resolution of the symptoms.  Sent course of Zofran to encourage oral hydration.  Strict turn precaution for severe shortness of breath or chest pain.    Disposition plan: Discharge  -----    Final diagnoses:   Viral URI        Nikhil Shane MD  05/03/23 4662

## 2023-05-09 ENCOUNTER — HOSPITAL ENCOUNTER (OUTPATIENT)
Dept: ULTRASOUND IMAGING | Facility: HOSPITAL | Age: 58
Discharge: HOME | End: 2023-05-09
Admitting: NURSE PRACTITIONER
Payer: MEDICAID

## 2023-05-09 ENCOUNTER — TRANSCRIBE ORDERS (OUTPATIENT)
Dept: ADMINISTRATIVE | Facility: HOSPITAL | Age: 58
End: 2023-05-09
Payer: MEDICAID

## 2023-05-09 DIAGNOSIS — R31.9 HEMATURIA SYNDROME: ICD-10-CM

## 2023-05-09 DIAGNOSIS — R31.9 HEMATURIA SYNDROME: Primary | ICD-10-CM

## 2023-05-09 PROCEDURE — 76700 US EXAM ABDOM COMPLETE: CPT

## 2023-07-11 ENCOUNTER — TELEPHONE (OUTPATIENT)
Dept: OBSTETRICS AND GYNECOLOGY | Facility: CLINIC | Age: 58
End: 2023-07-11

## 2023-07-11 NOTE — TELEPHONE ENCOUNTER
Provider: DR. LOVETT    Caller: RUMA COYLE    Relationship to Patient: SELF    Pharmacy: [facility] @ [address]#[phone]    Phone Number: [phone]    Reason for Call: PT IS HAVING SAME SYMPTOMS (6-22-21) BURNING, SORE, PAIN, DISCOMFORT VAGINAL. SHE IS ALSO OUT OF THE CREAM CLOBETASOL WHICH PROVIDES RELIEF FOR PT. SHE WENT TO  AND THEY RULED OUT UTI.  HOWEVER, PT MAY NOT HAVE INSURANCE AT END OF MONTH.   PER OFFICE SEND NOTE TO DR. LOVETT.    When was the patient last seen: 06-22-21  When did it start: 2 WEEKS AGO  Where is it located: VAGINAL  Characteristics of symptom/severity: SEVERE  Timing- Is it constant or intermittent: USING RESTROOM/WIPING MAKES IT WORSE

## 2024-06-03 ENCOUNTER — APPOINTMENT (OUTPATIENT)
Dept: GENERAL RADIOLOGY | Facility: HOSPITAL | Age: 59
End: 2024-06-03
Payer: COMMERCIAL

## 2024-06-03 ENCOUNTER — APPOINTMENT (OUTPATIENT)
Dept: CT IMAGING | Facility: HOSPITAL | Age: 59
End: 2024-06-03
Payer: COMMERCIAL

## 2024-06-03 ENCOUNTER — HOSPITAL ENCOUNTER (EMERGENCY)
Facility: HOSPITAL | Age: 59
Discharge: HOME | End: 2024-06-03
Attending: STUDENT IN AN ORGANIZED HEALTH CARE EDUCATION/TRAINING PROGRAM | Admitting: STUDENT IN AN ORGANIZED HEALTH CARE EDUCATION/TRAINING PROGRAM
Payer: COMMERCIAL

## 2024-06-03 VITALS
BODY MASS INDEX: 25.95 KG/M2 | DIASTOLIC BLOOD PRESSURE: 61 MMHG | TEMPERATURE: 97.8 F | HEIGHT: 64 IN | WEIGHT: 152 LBS | OXYGEN SATURATION: 96 % | HEART RATE: 60 BPM | RESPIRATION RATE: 18 BRPM | SYSTOLIC BLOOD PRESSURE: 118 MMHG

## 2024-06-03 DIAGNOSIS — M25.512 LEFT SHOULDER PAIN, UNSPECIFIED CHRONICITY: ICD-10-CM

## 2024-06-03 DIAGNOSIS — R07.9 CHEST PAIN, UNSPECIFIED TYPE: Primary | ICD-10-CM

## 2024-06-03 LAB
ALBUMIN SERPL-MCNC: 4.6 G/DL (ref 3.5–5.2)
ALBUMIN/GLOB SERPL: 1.7 G/DL
ALP SERPL-CCNC: 96 U/L (ref 39–117)
ALT SERPL W P-5'-P-CCNC: 19 U/L (ref 1–33)
ANION GAP SERPL CALCULATED.3IONS-SCNC: 9.3 MMOL/L (ref 5–15)
AST SERPL-CCNC: 22 U/L (ref 1–32)
BASOPHILS # BLD AUTO: 0.04 10*3/MM3 (ref 0–0.2)
BASOPHILS NFR BLD AUTO: 0.5 % (ref 0–1.5)
BILIRUB SERPL-MCNC: 0.6 MG/DL (ref 0–1.2)
BUN SERPL-MCNC: 10 MG/DL (ref 6–20)
BUN/CREAT SERPL: 12 (ref 7–25)
CALCIUM SPEC-SCNC: 9.8 MG/DL (ref 8.6–10.5)
CHLORIDE SERPL-SCNC: 107 MMOL/L (ref 98–107)
CO2 SERPL-SCNC: 25.7 MMOL/L (ref 22–29)
CREAT SERPL-MCNC: 0.83 MG/DL (ref 0.57–1)
DEPRECATED RDW RBC AUTO: 42.5 FL (ref 37–54)
EGFRCR SERPLBLD CKD-EPI 2021: 81.3 ML/MIN/1.73
EOSINOPHIL # BLD AUTO: 0.19 10*3/MM3 (ref 0–0.4)
EOSINOPHIL NFR BLD AUTO: 2.5 % (ref 0.3–6.2)
ERYTHROCYTE [DISTWIDTH] IN BLOOD BY AUTOMATED COUNT: 12.6 % (ref 12.3–15.4)
GEN 5 2HR TROPONIN T REFLEX: 7 NG/L
GLOBULIN UR ELPH-MCNC: 2.7 GM/DL
GLUCOSE SERPL-MCNC: 92 MG/DL (ref 65–99)
HCT VFR BLD AUTO: 47 % (ref 34–46.6)
HGB BLD-MCNC: 15.5 G/DL (ref 12–15.9)
HOLD SPECIMEN: NORMAL
HOLD SPECIMEN: NORMAL
IMM GRANULOCYTES # BLD AUTO: 0.02 10*3/MM3 (ref 0–0.05)
IMM GRANULOCYTES NFR BLD AUTO: 0.3 % (ref 0–0.5)
LYMPHOCYTES # BLD AUTO: 2.71 10*3/MM3 (ref 0.7–3.1)
LYMPHOCYTES NFR BLD AUTO: 35.7 % (ref 19.6–45.3)
MCH RBC QN AUTO: 30.2 PG (ref 26.6–33)
MCHC RBC AUTO-ENTMCNC: 33 G/DL (ref 31.5–35.7)
MCV RBC AUTO: 91.4 FL (ref 79–97)
MONOCYTES # BLD AUTO: 0.5 10*3/MM3 (ref 0.1–0.9)
MONOCYTES NFR BLD AUTO: 6.6 % (ref 5–12)
NEUTROPHILS NFR BLD AUTO: 4.13 10*3/MM3 (ref 1.7–7)
NEUTROPHILS NFR BLD AUTO: 54.4 % (ref 42.7–76)
NRBC BLD AUTO-RTO: 0 /100 WBC (ref 0–0.2)
NT-PROBNP SERPL-MCNC: 59.4 PG/ML (ref 0–900)
PLATELET # BLD AUTO: 217 10*3/MM3 (ref 140–450)
PMV BLD AUTO: 8.9 FL (ref 6–12)
POTASSIUM SERPL-SCNC: 4.1 MMOL/L (ref 3.5–5.2)
PROT SERPL-MCNC: 7.3 G/DL (ref 6–8.5)
RBC # BLD AUTO: 5.14 10*6/MM3 (ref 3.77–5.28)
SODIUM SERPL-SCNC: 142 MMOL/L (ref 136–145)
TROPONIN T DELTA: 1 NG/L
TROPONIN T SERPL HS-MCNC: 6 NG/L
WBC NRBC COR # BLD AUTO: 7.59 10*3/MM3 (ref 3.4–10.8)
WHOLE BLOOD HOLD COAG: NORMAL
WHOLE BLOOD HOLD SPECIMEN: NORMAL

## 2024-06-03 PROCEDURE — 93005 ELECTROCARDIOGRAM TRACING: CPT | Performed by: STUDENT IN AN ORGANIZED HEALTH CARE EDUCATION/TRAINING PROGRAM

## 2024-06-03 PROCEDURE — 84484 ASSAY OF TROPONIN QUANT: CPT | Performed by: STUDENT IN AN ORGANIZED HEALTH CARE EDUCATION/TRAINING PROGRAM

## 2024-06-03 PROCEDURE — 83880 ASSAY OF NATRIURETIC PEPTIDE: CPT | Performed by: NURSE PRACTITIONER

## 2024-06-03 PROCEDURE — 71275 CT ANGIOGRAPHY CHEST: CPT

## 2024-06-03 PROCEDURE — 71045 X-RAY EXAM CHEST 1 VIEW: CPT

## 2024-06-03 PROCEDURE — 73030 X-RAY EXAM OF SHOULDER: CPT

## 2024-06-03 PROCEDURE — 85025 COMPLETE CBC W/AUTO DIFF WBC: CPT | Performed by: STUDENT IN AN ORGANIZED HEALTH CARE EDUCATION/TRAINING PROGRAM

## 2024-06-03 PROCEDURE — 80053 COMPREHEN METABOLIC PANEL: CPT | Performed by: STUDENT IN AN ORGANIZED HEALTH CARE EDUCATION/TRAINING PROGRAM

## 2024-06-03 PROCEDURE — 25510000001 IOPAMIDOL 61 % SOLUTION: Performed by: STUDENT IN AN ORGANIZED HEALTH CARE EDUCATION/TRAINING PROGRAM

## 2024-06-03 PROCEDURE — 36415 COLL VENOUS BLD VENIPUNCTURE: CPT

## 2024-06-03 PROCEDURE — 99285 EMERGENCY DEPT VISIT HI MDM: CPT

## 2024-06-03 PROCEDURE — 70450 CT HEAD/BRAIN W/O DYE: CPT

## 2024-06-03 RX ORDER — SODIUM CHLORIDE 0.9 % (FLUSH) 0.9 %
10 SYRINGE (ML) INJECTION AS NEEDED
Status: DISCONTINUED | OUTPATIENT
Start: 2024-06-03 | End: 2024-06-03 | Stop reason: HOSPADM

## 2024-06-03 RX ORDER — ONDANSETRON 4 MG/1
1 TABLET, FILM COATED ORAL 3 TIMES DAILY
COMMUNITY
Start: 2024-05-20 | End: 2024-06-03

## 2024-06-03 RX ORDER — CLOPIDOGREL BISULFATE 75 MG/1
1 TABLET ORAL DAILY
COMMUNITY
Start: 2024-04-23

## 2024-06-03 RX ORDER — SERTRALINE HYDROCHLORIDE 25 MG/1
25 TABLET, FILM COATED ORAL DAILY
COMMUNITY

## 2024-06-03 RX ORDER — ASPIRIN 325 MG
325 TABLET ORAL ONCE
Status: COMPLETED | OUTPATIENT
Start: 2024-06-03 | End: 2024-06-03

## 2024-06-03 RX ORDER — SACUBITRIL AND VALSARTAN 24; 26 MG/1; MG/1
1 TABLET, FILM COATED ORAL 2 TIMES DAILY
COMMUNITY

## 2024-06-03 RX ORDER — ATORVASTATIN CALCIUM 40 MG/1
2 TABLET, FILM COATED ORAL DAILY
COMMUNITY
Start: 2024-04-24

## 2024-06-03 RX ADMIN — IOPAMIDOL 100 ML: 612 INJECTION, SOLUTION INTRAVENOUS at 11:32

## 2024-06-03 RX ADMIN — ASPIRIN 325 MG: 325 TABLET ORAL at 10:49

## 2024-06-03 NOTE — DISCHARGE INSTRUCTIONS
Will have you follow-up with Dr. Baca as scheduled.  Follow-up with Dr. Childs for PFT.  Follow-up ER for new or worsening symptoms.

## 2024-06-03 NOTE — ED PROVIDER NOTES
Subjective:  History of Present Illness:    Patient is a 58-year-old female with history of myocardial infarction, hypertension, and CAD.  Presents to the ER today for intermittent chest pain over the last month.  Reports last episode was 30 minutes PTA.  Reports that this episode was much worse than previous episodes.  Reports that it does radiate down the left arm.  Also reports that she has a migraine headache.  She does appreciate some intermittent shortness of breath with this as well.  She denies fever.  Denies cough.  Denies OTC medication home remedy.  Denies alleviating exacerbating factors.    Nurses Notes reviewed and agree, including vitals, allergies, social history and prior medical history.     REVIEW OF SYSTEMS: All systems reviewed and not pertinent unless noted.  Review of Systems   Respiratory:  Positive for shortness of breath.    Cardiovascular:  Positive for chest pain.   Neurological:  Positive for headaches.   All other systems reviewed and are negative.      Past Medical History:   Diagnosis Date    Arthritis     Back pain     Cataract 05/20/2021    Elevated cholesterol     Frequent headaches     Hyperlipidemia     Hypertension     Myocardial infarction     PONV (postoperative nausea and vomiting)        Allergies:    Prednisone and Flagyl [metronidazole]      Past Surgical History:   Procedure Laterality Date    CARDIAC CATHETERIZATION N/A 06/18/2021    Procedure: CORONARY ANGIOGRAPHY;  Surgeon: William Camejo IV, MD;  Location: [facility] CATH INVASIVE LOCATION;  Service: Cardiovascular;  Laterality: N/A;    CATARACT EXTRACTION WITH INTRAOCULAR LENS IMPLANT Left 05/20/2021    CHOLECYSTECTOMY      COLONOSCOPY      COLONOSCOPY N/A 05/21/2021    Procedure: COLONOSCOPY;  Surgeon: Lynn Durham MD;  Location: [facility] ENDOSCOPY;  Service: Gastroenterology;  Laterality: N/A;    EYE SURGERY      HYSTERECTOMY  2012    OOPHORECTOMY           Social History     Socioeconomic History     "Marital status:    Tobacco Use    Smoking status: Never    Smokeless tobacco: Never   Vaping Use    Vaping status: Never Used   Substance and Sexual Activity    Alcohol use: No    Drug use: No    Sexual activity: Defer         Family History   Problem Relation Age of Onset    Heart disease Mother     Diabetes Mother     Breast cancer Mother     Heart disease Father     Diabetes Father     Colon cancer Neg Hx     Esophageal cancer Neg Hx     Liver cancer Neg Hx     Liver disease Neg Hx     Stomach cancer Neg Hx        Objective  Physical Exam:  /64   Pulse 62   Temp 97.8 °F (36.6 °C) (Oral)   Resp 18   Ht 162.6 cm (64\")   Wt 68.9 kg (152 lb)   LMP  (LMP Unknown)   SpO2 94%   BMI 26.09 kg/m²      Physical Exam  Vitals and nursing note reviewed.   Constitutional:       Appearance: She is well-developed and normal weight.   HENT:      Head: Normocephalic and atraumatic.   Eyes:      Extraocular Movements: Extraocular movements intact.      Pupils: Pupils are equal, round, and reactive to light.   Cardiovascular:      Rate and Rhythm: Normal rate and regular rhythm.      Heart sounds: Normal heart sounds.   Pulmonary:      Effort: Pulmonary effort is normal.      Breath sounds: Normal breath sounds.   Abdominal:      General: Bowel sounds are normal.      Palpations: Abdomen is soft.   Musculoskeletal:         General: Normal range of motion.      Cervical back: Normal range of motion and neck supple.   Skin:     General: Skin is warm.      Capillary Refill: Capillary refill takes less than 2 seconds.   Neurological:      General: No focal deficit present.      Mental Status: She is alert and oriented to person, place, and time.   Psychiatric:         Mood and Affect: Mood normal.         Behavior: Behavior normal.         Procedures    ED Course:         Lab Results (last 24 hours)       Procedure Component Value Units Date/Time    CBC & Differential [[id number]]  (Abnormal) Collected: 06/03/24 1036 "    Specimen: Blood Updated: 06/03/24 1047    Narrative:      The following orders were created for panel order CBC & Differential.  Procedure                               Abnormality         Status                     ---------                               -----------         ------                     CBC Auto Differential[[id number]]        Abnormal            Final result                 Please view results for these tests on the individual orders.    Comprehensive Metabolic Panel [[id number]] Collected: 06/03/24 1036    Specimen: Blood Updated: 06/03/24 1103     Glucose 92 mg/dL      BUN 10 mg/dL      Creatinine 0.83 mg/dL      Sodium 142 mmol/L      Potassium 4.1 mmol/L      Chloride 107 mmol/L      CO2 25.7 mmol/L      Calcium 9.8 mg/dL      Total Protein 7.3 g/dL      Albumin 4.6 g/dL      ALT (SGPT) 19 U/L      AST (SGOT) 22 U/L      Alkaline Phosphatase 96 U/L      Total Bilirubin 0.6 mg/dL      Globulin 2.7 gm/dL      A/G Ratio 1.7 g/dL      BUN/Creatinine Ratio 12.0     Anion Gap 9.3 mmol/L      eGFR 81.3 mL/min/1.73     Narrative:      GFR Normal >60  Chronic Kidney Disease <60  Kidney Failure <15      High Sensitivity Troponin T [[id number]]  (Normal) Collected: 06/03/24 1036    Specimen: Blood Updated: 06/03/24 1105     HS Troponin T 6 ng/L     Narrative:      High Sensitive Troponin T Reference Range:  <14.0 ng/L- Negative Female for AMI  <22.0 ng/L- Negative Male for AMI  >=14 - Abnormal Female indicating possible myocardial injury.  >=22 - Abnormal Male indicating possible myocardial injury.   Clinicians would have to utilize clinical acumen, EKG, Troponin, and serial changes to determine if it is an Acute Myocardial Infarction or myocardial injury due to an underlying chronic condition.         CBC Auto Differential [[id number]]  (Abnormal) Collected: 06/03/24 1036    Specimen: Blood Updated: 06/03/24 1047     WBC 7.59 10*3/mm3      RBC 5.14 10*6/mm3      Hemoglobin 15.5 g/dL      Hematocrit 47.0 %       MCV 91.4 fL      MCH 30.2 pg      MCHC 33.0 g/dL      RDW 12.6 %      RDW-SD 42.5 fl      MPV 8.9 fL      Platelets 217 10*3/mm3      Neutrophil % 54.4 %      Lymphocyte % 35.7 %      Monocyte % 6.6 %      Eosinophil % 2.5 %      Basophil % 0.5 %      Immature Grans % 0.3 %      Neutrophils, Absolute 4.13 10*3/mm3      Lymphocytes, Absolute 2.71 10*3/mm3      Monocytes, Absolute 0.50 10*3/mm3      Eosinophils, Absolute 0.19 10*3/mm3      Basophils, Absolute 0.04 10*3/mm3      Immature Grans, Absolute 0.02 10*3/mm3      nRBC 0.0 /100 WBC     BNP [[id number]]  (Normal) Collected: 06/03/24 1036    Specimen: Blood Updated: 06/03/24 1122     proBNP 59.4 pg/mL     Narrative:      This assay is used as an aid in the diagnosis of individuals suspected of having heart failure. It can be used as an aid in the diagnosis of acute decompensated heart failure (ADHF) in patients presenting with signs and symptoms of ADHF to the emergency department (ED). In addition, NT-proBNP of <300 pg/mL indicates ADHF is not likely.    Age Range Result Interpretation  NT-proBNP Concentration (pg/mL:      <50             Positive            >450                   Gray                 300-450                    Negative             <300    50-75           Positive            >900                  Gray                300-900                  Negative            <300      >75             Positive            >1800                  Gray                300-1800                  Negative            <300    High Sensitivity Troponin T 2Hr [[id number]]  (Normal) Collected: 06/03/24 1237    Specimen: Blood Updated: 06/03/24 1304     HS Troponin T 7 ng/L      Troponin T Delta 1 ng/L     Narrative:      High Sensitive Troponin T Reference Range:  <14.0 ng/L- Negative Female for AMI  <22.0 ng/L- Negative Male for AMI  >=14 - Abnormal Female indicating possible myocardial injury.  >=22 - Abnormal Male indicating possible myocardial injury.   Clinicians  would have to utilize clinical acumen, EKG, Troponin, and serial changes to determine if it is an Acute Myocardial Infarction or myocardial injury due to an underlying chronic condition.                  XR Shoulder 2+ View Left    Result Date: 6/3/2024  PROCEDURE: XR SHOULDER 2+ VW LEFT-  THREE VIEW  HISTORY: left shoulder pain  FINDINGS:  Three views show no evidence of an acute, displaced fracture or dislocation of the visualized bony architecture.  The joint spaces appear normal.      Impression: Unremarkable exam.     This report was signed and finalized on 6/3/2024 2:03 PM by Joaquin Caal MD.      CT Angiogram Chest Pulmonary Embolism    Result Date: 6/3/2024  PROCEDURE: CT ANGIOGRAM CHEST PULMONARY EMBOLISM-  HISTORY: soa/chest pain  TECHNIQUE: Thin section axial CT with IV contrast supplemented with 3D reconstructed MIP images.  FINDINGS:  Pulmonary vessels enhance in a normal fashion without evidence of embolic disease. Thoracic aorta is normal in caliber without evidence of aneurysm or dissection.  No acute lung disease is present.  No pleural or pericardial effusion is seen. No adenopathy or mass lesion is present.  Parapelvic left renal cysts are present.      Impression: 1. No evidence of pulmonary embolism.    This study was performed with techniques to keep radiation doses as low as reasonably achievable (ALARA). Individualized dose reduction techniques using automated exposure control or adjustment of vA and/or kV according to the patient size were employed.  This report was signed and finalized on 6/3/2024 12:10 PM by Joaquin Caal MD.      CT Head Without Contrast    Result Date: 6/3/2024  PROCEDURE: CT HEAD WO CONTRAST-  HISTORY: Headaches  COMPARISON: 5/20/2022  TECHNIQUE: Noncontrast exam  FINDINGS: Brain parenchyma is homogeneous without evidence of hemorrhage, mass effect or edema. No extra-axial abnormality is noted. Ventricles and cisterns appear normal.  The visualized sinuses, orbits and  petrous temporal bones appear unremarkable.      Impression: Unremarkable unenhanced CT of the brain.   This study was performed with techniques to keep radiation doses as low as reasonably achievable (ALARA). Individualized dose reduction techniques using automated exposure control or adjustment of vA and/or kV according to the patient size were employed.    This report was signed and finalized on 6/3/2024 11:47 AM by Joaquin Caal MD.      XR Chest 1 View    Result Date: 6/3/2024  PROCEDURE: XR CHEST 1 VW-  HISTORY: Chest Pain Triage Protocol  COMPARISON: 5/20/2022  FINDINGS:  Portable view of the chest demonstrates the lungs to be grossly clear. There is no evidence of effusion, pneumothorax or other significant pleural disease. The mediastinum is unremarkable.  The heart size is normal.      Impression: Unremarkable portable chest.    This report was signed and finalized on 6/3/2024 11:46 AM by Joaquin Caal MD.          MDM      Initial impression of presenting illness: Patient is a 58-year-old female with history of myocardial infarction, hypertension, and CAD.  Presents to the ER today for intermittent chest pain over the last month.  Reports last episode was 30 minutes PTA.  Reports that this episode was much worse than previous episodes.  Reports that it does radiate down the left arm.  Also reports that she has a migraine headache.  She does appreciate some intermittent shortness of breath with this as well.  She denies fever.  Denies cough.  Denies OTC medication home remedy.  Denies alleviating exacerbating factors.    DDX: includes but is not limited to: Strain, sprain, myocardial infarction, PE or other    Patient arrives stable with vitals interpreted by myself.     Pertinent features from physical exam: Lung sounds are clear bilaterally throughout.  Abdomen soft nontender.  Bowel sounds normal.  Heart sounds normal..    Initial diagnostic plan: CBC, CMP, troponin, BNP, EKG, CT PE protocol, CT head,  chest x-ray    Results from initial plan were reviewed and interpreted by me revealing CBC is within normal parameters.  BMP is within normal parameters.  Troponin was negative.  CBC is within appropriate range.  EKG is with sinus rhythm with a first-degree bundle branch block rate of 68.  Chest x-ray with the following impression.  Unremarkable portable chest CT PE protocol no evidence of pulmonary embolism.  CT head unremarkable unenhanced CT of the brain.  Left shoulder x-ray without acute fracture.    Diagnostic information from other sources: Chart review    Interventions / Re-evaluation: Vital signs stable throughout encounter.    Results/clinical rationale were discussed with patient    Consultations/Discussion of results with other physicians: N/A    Disposition plan: Patient is hemodynamically stable nontoxic-appearing appropriate discharge.  Please follow-up with cardiology.  Follow-up with ER for new or worse symptoms.  -----        Final diagnoses:   Chest pain, unspecified type   Left shoulder pain, unspecified chronicity          Melvin Bob, APRN  06/03/24 3303

## 2024-06-11 ENCOUNTER — OFFICE VISIT (OUTPATIENT)
Dept: CARDIOLOGY | Facility: CLINIC | Age: 59
End: 2024-06-11
Payer: COMMERCIAL

## 2024-06-11 VITALS
HEART RATE: 60 BPM | HEIGHT: 55 IN | DIASTOLIC BLOOD PRESSURE: 82 MMHG | SYSTOLIC BLOOD PRESSURE: 128 MMHG | WEIGHT: 155.6 LBS | OXYGEN SATURATION: 96 % | BODY MASS INDEX: 36.01 KG/M2 | RESPIRATION RATE: 17 BRPM

## 2024-06-11 DIAGNOSIS — R06.02 SOB (SHORTNESS OF BREATH): ICD-10-CM

## 2024-06-11 DIAGNOSIS — R00.2 PALPITATIONS: ICD-10-CM

## 2024-06-11 DIAGNOSIS — I50.22 CHRONIC SYSTOLIC CONGESTIVE HEART FAILURE: ICD-10-CM

## 2024-06-11 DIAGNOSIS — I51.81 STRESS-INDUCED CARDIOMYOPATHY: Primary | ICD-10-CM

## 2024-06-11 DIAGNOSIS — E78.5 DYSLIPIDEMIA: ICD-10-CM

## 2024-06-11 DIAGNOSIS — R07.2 PRECORDIAL PAIN: ICD-10-CM

## 2024-06-11 DIAGNOSIS — I10 PRIMARY HYPERTENSION: ICD-10-CM

## 2024-07-25 LAB
BH CV ECHO MEAS - AO MAX PG: 4.8 MMHG
BH CV ECHO MEAS - AO MEAN PG: 3 MMHG
BH CV ECHO MEAS - AO ROOT DIAM: 1.8 CM
BH CV ECHO MEAS - AO V2 MAX: 109 CM/SEC
BH CV ECHO MEAS - AO V2 VTI: 20.8 CM
BH CV ECHO MEAS - AVA(I,D): 2.9 CM2
BH CV ECHO MEAS - EDV(CUBED): 35.6 ML
BH CV ECHO MEAS - EDV(MOD-SP4): 86.2 ML
BH CV ECHO MEAS - EF(MOD-SP4): 68.7 %
BH CV ECHO MEAS - ESV(CUBED): 14.7 ML
BH CV ECHO MEAS - ESV(MOD-SP4): 27 ML
BH CV ECHO MEAS - FS: 25.5 %
BH CV ECHO MEAS - IVS/LVPW: 0.57 CM
BH CV ECHO MEAS - IVSD: 0.71 CM
BH CV ECHO MEAS - LA DIMENSION: 3.2 CM
BH CV ECHO MEAS - LAT PEAK E' VEL: 9.6 CM/SEC
BH CV ECHO MEAS - LV DIASTOLIC VOL/BSA (35-75): 49.1 CM2
BH CV ECHO MEAS - LV MASS(C)D: 91.4 GRAMS
BH CV ECHO MEAS - LV MAX PG: 3.5 MMHG
BH CV ECHO MEAS - LV MEAN PG: 2 MMHG
BH CV ECHO MEAS - LV SYSTOLIC VOL/BSA (12-30): 15.4 CM2
BH CV ECHO MEAS - LV V1 MAX: 93.4 CM/SEC
BH CV ECHO MEAS - LV V1 VTI: 19.5 CM
BH CV ECHO MEAS - LVIDD: 3.3 CM
BH CV ECHO MEAS - LVIDS: 2.45 CM
BH CV ECHO MEAS - LVOT AREA: 3.1 CM2
BH CV ECHO MEAS - LVOT DIAM: 2 CM
BH CV ECHO MEAS - LVPWD: 0.8 CM
BH CV ECHO MEAS - MED PEAK E' VEL: 8.9 CM/SEC
BH CV ECHO MEAS - MV A MAX VEL: 108 CM/SEC
BH CV ECHO MEAS - MV DEC SLOPE: 507 CM/SEC2
BH CV ECHO MEAS - MV DEC TIME: 0.16 SEC
BH CV ECHO MEAS - MV E MAX VEL: 84.9 CM/SEC
BH CV ECHO MEAS - MV E/A: 0.79
BH CV ECHO MEAS - MV MAX PG: 4.4 MMHG
BH CV ECHO MEAS - MV MEAN PG: 2 MMHG
BH CV ECHO MEAS - MV V2 VTI: 21.1 CM
BH CV ECHO MEAS - MVA(VTI): 2.9 CM2
BH CV ECHO MEAS - PA V2 MAX: 91.7 CM/SEC
BH CV ECHO MEAS - RAP SYSTOLE: 3 MMHG
BH CV ECHO MEAS - SV(LVOT): 61.3 ML
BH CV ECHO MEAS - SV(MOD-SP4): 59.2 ML
BH CV ECHO MEAS - SVI(LVOT): 34.9 ML/M2
BH CV ECHO MEAS - SVI(MOD-SP4): 33.7 ML/M2
BH CV ECHO MEAS - TAPSE (>1.6): 2.24 CM
BH CV ECHO MEASUREMENTS AVERAGE E/E' RATIO: 9.18
BH CV XLRA - RV BASE: 2.25 CM
BH CV XLRA - RV LENGTH: 5.1 CM
BH CV XLRA - RV MID: 2.7 CM
BH CV XLRA - TDI S': 14.7 CM/SEC
LEFT ATRIUM VOLUME INDEX: 21.6 ML/M2
LV EF 2D ECHO EST: 55 %

## 2024-08-08 ENCOUNTER — OFFICE VISIT (OUTPATIENT)
Dept: CARDIOLOGY | Facility: CLINIC | Age: 59
End: 2024-08-08
Payer: COMMERCIAL

## 2024-08-08 VITALS
OXYGEN SATURATION: 94 % | DIASTOLIC BLOOD PRESSURE: 80 MMHG | HEIGHT: 64 IN | BODY MASS INDEX: 27.31 KG/M2 | SYSTOLIC BLOOD PRESSURE: 120 MMHG | HEART RATE: 73 BPM | WEIGHT: 160 LBS

## 2024-08-08 DIAGNOSIS — R06.02 SOB (SHORTNESS OF BREATH): ICD-10-CM

## 2024-08-08 DIAGNOSIS — E78.5 DYSLIPIDEMIA: ICD-10-CM

## 2024-08-08 DIAGNOSIS — R07.2 PRECORDIAL PAIN: ICD-10-CM

## 2024-08-08 DIAGNOSIS — I51.81 STRESS-INDUCED CARDIOMYOPATHY: ICD-10-CM

## 2024-08-08 DIAGNOSIS — I50.22 CHRONIC SYSTOLIC CONGESTIVE HEART FAILURE: Primary | ICD-10-CM

## 2024-08-08 DIAGNOSIS — I10 PRIMARY HYPERTENSION: ICD-10-CM

## 2024-08-08 NOTE — PROGRESS NOTES
Subjective:     Encounter Date:08/08/2024      Patient ID: Shilpa Vides is a 58 y.o. female.    Chief Complaint: Shortness of breath  HPI  This is a 58-year-old female patient with history of stress-induced cardiomyopathy who presents to cardiology clinic to discuss results of outpatient testing.  The patient underwent an echocardiogram which shows complete recovery of global and regional LV systolic function.  Previous echocardiogram had shown an ejection fraction of 30% with prominent apical dyskinesis.  Recent repeat echocardiogram shows an ejection fraction of 50-55% with no regional wall motion abnormalities.  There was grade 1 diastolic dysfunction but no evidence of valvular heart disease, pericardial disease or pulmonary hypertension.  The patient also wore a cardiac monitor showing no significant atrial or ventricular ectopy.  The patient was scheduled to have a Lexiscan but this was denied by her insurance company.  The patient reports that she had a heart catheterization by Dr. Louis in [facility] in August of last year which showed no significant coronary artery disease consistent with her previous invasive coronary angiogram in June 2021 which also showed no significant coronary disease.  The patient reports that her dyspnea remains largely unchanged in regards to quality, frequency, duration and intensity.  She has had no orthopnea PND or lower extremity edema.  Her chest discomfort has largely resolved without specific intervention.  The following portions of the patient's history were reviewed and updated as appropriate: allergies, current medications, past family history, past medical history, past social history, past surgical history and problem  Review of Systems   Constitutional: Negative for chills, diaphoresis, fever, malaise/fatigue, weight gain and weight loss.   HENT:  Negative for ear discharge, hearing loss, hoarse voice and nosebleeds.    Eyes:  Negative for discharge,  double vision, pain and photophobia.   Cardiovascular:  Positive for dyspnea on exertion. Negative for chest pain, claudication, cyanosis, irregular heartbeat, leg swelling, near-syncope, orthopnea, palpitations, paroxysmal nocturnal dyspnea and syncope.   Respiratory:  Positive for shortness of breath. Negative for cough, hemoptysis, sputum production and wheezing.    Endocrine: Negative for cold intolerance, heat intolerance, polydipsia, polyphagia and polyuria.   Hematologic/Lymphatic: Negative for adenopathy and bleeding problem. Does not bruise/bleed easily.   Skin:  Negative for color change, flushing, itching and rash.   Musculoskeletal:  Negative for muscle cramps, muscle weakness, myalgias and stiffness.   Gastrointestinal:  Negative for abdominal pain, diarrhea, hematemesis, hematochezia, nausea and vomiting.   Genitourinary:  Negative for dysuria, frequency and nocturia.   Neurological:  Negative for focal weakness, loss of balance, numbness, paresthesias and seizures.   Psychiatric/Behavioral:  Negative for altered mental status, hallucinations and suicidal ideas.    Allergic/Immunologic: Negative for HIV exposure, hives and persistent infections.           Current Outpatient Medications:     aspirin 81 MG EC tablet, Take 1 tablet by mouth Daily. On hold this week., Disp: , Rfl:     atorvastatin (LIPITOR) 40 MG tablet, Take 2 tablets by mouth Daily., Disp: , Rfl:     Cholecalciferol (VITAMIN D) 2000 UNITS tablet, Take 1 tablet by mouth Daily., Disp: , Rfl:     clopidogrel (PLAVIX) 75 MG tablet, Take 1 tablet by mouth Daily., Disp: , Rfl:     diazePAM (VALIUM) 5 MG tablet, Take 1 tablet by mouth Daily As Needed., Disp: , Rfl:     meclizine (ANTIVERT) 25 MG tablet, Take 1 tablet by mouth 4 (Four) Times a Day As Needed for dizziness., Disp: 30 tablet, Rfl: 0    metoprolol succinate XL (TOPROL-XL) 25 MG 24 hr tablet, Take 1 tablet by mouth Daily., Disp: , Rfl:     ondansetron ODT (ZOFRAN-ODT) 4 MG  "disintegrating tablet, Place 1 tablet on the tongue Every 8 (Eight) Hours As Needed for Nausea or Vomiting., Disp: 20 tablet, Rfl: 0    sacubitril-valsartan (Entresto) 24-26 MG tablet, Take 1 tablet by mouth 2 (Two) Times a Day., Disp: , Rfl:     sertraline (ZOLOFT) 25 MG tablet, Take 1 tablet by mouth Daily., Disp: , Rfl:     Objective:   Vitals and nursing note reviewed.   Constitutional:       Appearance: Healthy appearance. Not in distress.   Neck:      Vascular: No JVR. JVD normal.   Pulmonary:      Effort: Pulmonary effort is normal.      Breath sounds: Normal breath sounds. No wheezing. No rhonchi. No rales.   Chest:      Chest wall: Not tender to palpatation.   Cardiovascular:      PMI at left midclavicular line. Normal rate. Regular rhythm. Normal S1. Normal S2.       Murmurs: There is no murmur.      No gallop.  No click. No rub.   Pulses:     Intact distal pulses.   Edema:     Peripheral edema absent.   Abdominal:      General: Bowel sounds are normal.      Palpations: Abdomen is soft.      Tenderness: There is no abdominal tenderness.   Musculoskeletal: Normal range of motion.         General: No tenderness. Skin:     General: Skin is warm and dry.   Neurological:      General: No focal deficit present.      Mental Status: Alert and oriented to person, place and time.       Blood pressure 120/80, pulse 73, height 162.6 cm (64\"), weight 72.6 kg (160 lb), SpO2 94%, not currently breastfeeding.   Lab Review:     Assessment:       1. Chronic systolic congestive heart failure  Normalization of left ventricular ejection fraction by recent echocardiogram.  Euvolemic.  Current dyspnea is felt to be noncardiac in etiology.  Euvolemic.  Tolerating guideline directed medical therapy without side effects.    2. Dyslipidemia  Tolerating statin based cholesterol-lowering therapy without side effects.    3. Precordial pain  Spontaneous resolution of symptoms.  Noncardiac etiology based on results of recent cardiac " catheterization.    4. Primary hypertension  Acceptable blood pressure control.    5. Stress-induced cardiomyopathy  Complete resolution of regional wall motion abnormalities and normalization of ejection fraction.  To previous invasive coronary angiograms over the last 3 years have shown no significant coronary artery disease.    6. SOB (shortness of breath)  Noncardiac dyspnea.    Procedures    Plan:     The patient has been reassured regarding the benign nature of her cardiac test results.    Consideration should be given to evaluating potential respiratory etiology to dyspnea.    No further cardiovascular testing is warranted.    No changes in medications made at today's visit.

## 2024-09-26 ENCOUNTER — TRANSCRIBE ORDERS (OUTPATIENT)
Dept: ADMINISTRATIVE | Facility: HOSPITAL | Age: 59
End: 2024-09-26
Payer: COMMERCIAL

## 2024-09-26 DIAGNOSIS — R06.02 SOB (SHORTNESS OF BREATH): Primary | ICD-10-CM

## 2024-09-30 ENCOUNTER — TRANSCRIBE ORDERS (OUTPATIENT)
Dept: ADMINISTRATIVE | Facility: HOSPITAL | Age: 59
End: 2024-09-30
Payer: COMMERCIAL

## 2024-09-30 DIAGNOSIS — R05.9 COUGH, UNSPECIFIED TYPE: ICD-10-CM

## 2024-09-30 DIAGNOSIS — R06.02 SOB (SHORTNESS OF BREATH): Primary | ICD-10-CM

## 2024-11-13 ENCOUNTER — HOSPITAL ENCOUNTER (OUTPATIENT)
Dept: CT IMAGING | Facility: HOSPITAL | Age: 59
Discharge: HOME | End: 2024-11-13
Payer: COMMERCIAL

## 2024-11-13 ENCOUNTER — HOSPITAL ENCOUNTER (OUTPATIENT)
Dept: PULMONOLOGY | Facility: HOSPITAL | Age: 59
Discharge: HOME | End: 2024-11-13
Payer: COMMERCIAL

## 2024-11-13 DIAGNOSIS — R06.02 SOB (SHORTNESS OF BREATH): ICD-10-CM

## 2024-11-13 DIAGNOSIS — R05.9 COUGH, UNSPECIFIED TYPE: ICD-10-CM

## 2024-11-13 PROCEDURE — 71250 CT THORAX DX C-: CPT

## 2024-11-13 PROCEDURE — 94010 BREATHING CAPACITY TEST: CPT

## 2025-01-24 ENCOUNTER — LAB (OUTPATIENT)
Dept: LAB | Facility: HOSPITAL | Age: 60
End: 2025-01-24
Payer: COMMERCIAL

## 2025-01-24 ENCOUNTER — OFFICE VISIT (OUTPATIENT)
Dept: PULMONOLOGY | Facility: CLINIC | Age: 60
End: 2025-01-24
Payer: COMMERCIAL

## 2025-01-24 VITALS
HEART RATE: 82 BPM | RESPIRATION RATE: 18 BRPM | DIASTOLIC BLOOD PRESSURE: 72 MMHG | HEIGHT: 64 IN | BODY MASS INDEX: 29.37 KG/M2 | OXYGEN SATURATION: 97 % | SYSTOLIC BLOOD PRESSURE: 120 MMHG | WEIGHT: 172 LBS

## 2025-01-24 DIAGNOSIS — J30.89 NON-SEASONAL ALLERGIC RHINITIS, UNSPECIFIED TRIGGER: ICD-10-CM

## 2025-01-24 DIAGNOSIS — R89.8 EOSINOPHIL COUNT RAISED: ICD-10-CM

## 2025-01-24 DIAGNOSIS — J44.9 MODERATE COPD (CHRONIC OBSTRUCTIVE PULMONARY DISEASE): Primary | ICD-10-CM

## 2025-01-24 DIAGNOSIS — J44.9 MODERATE COPD (CHRONIC OBSTRUCTIVE PULMONARY DISEASE): ICD-10-CM

## 2025-01-24 LAB
BASOPHILS # BLD AUTO: 0.06 10*3/MM3 (ref 0–0.2)
BASOPHILS NFR BLD AUTO: 0.8 % (ref 0–1.5)
DEPRECATED RDW RBC AUTO: 42.3 FL (ref 37–54)
EOSINOPHIL # BLD AUTO: 0.18 10*3/MM3 (ref 0–0.4)
EOSINOPHIL NFR BLD AUTO: 2.4 % (ref 0.3–6.2)
ERYTHROCYTE [DISTWIDTH] IN BLOOD BY AUTOMATED COUNT: 12.8 % (ref 12.3–15.4)
HCT VFR BLD AUTO: 42.9 % (ref 34–46.6)
HGB BLD-MCNC: 14.4 G/DL (ref 12–15.9)
IMM GRANULOCYTES # BLD AUTO: 0.02 10*3/MM3 (ref 0–0.05)
IMM GRANULOCYTES NFR BLD AUTO: 0.3 % (ref 0–0.5)
LYMPHOCYTES # BLD AUTO: 2.2 10*3/MM3 (ref 0.7–3.1)
LYMPHOCYTES NFR BLD AUTO: 29.1 % (ref 19.6–45.3)
MCH RBC QN AUTO: 30.9 PG (ref 26.6–33)
MCHC RBC AUTO-ENTMCNC: 33.6 G/DL (ref 31.5–35.7)
MCV RBC AUTO: 92.1 FL (ref 79–97)
MONOCYTES # BLD AUTO: 0.62 10*3/MM3 (ref 0.1–0.9)
MONOCYTES NFR BLD AUTO: 8.2 % (ref 5–12)
NEUTROPHILS NFR BLD AUTO: 4.48 10*3/MM3 (ref 1.7–7)
NEUTROPHILS NFR BLD AUTO: 59.2 % (ref 42.7–76)
NRBC BLD AUTO-RTO: 0 /100 WBC (ref 0–0.2)
PLATELET # BLD AUTO: 227 10*3/MM3 (ref 140–450)
PMV BLD AUTO: 9.4 FL (ref 6–12)
RBC # BLD AUTO: 4.66 10*6/MM3 (ref 3.77–5.28)
WBC NRBC COR # BLD AUTO: 7.56 10*3/MM3 (ref 3.4–10.8)

## 2025-01-24 PROCEDURE — 86003 ALLG SPEC IGE CRUDE XTRC EA: CPT

## 2025-01-24 PROCEDURE — 99204 OFFICE O/P NEW MOD 45 MIN: CPT | Performed by: INTERNAL MEDICINE

## 2025-01-24 PROCEDURE — 82785 ASSAY OF IGE: CPT

## 2025-01-24 PROCEDURE — 85025 COMPLETE CBC W/AUTO DIFF WBC: CPT

## 2025-01-24 PROCEDURE — 36415 COLL VENOUS BLD VENIPUNCTURE: CPT

## 2025-01-24 PROCEDURE — 3074F SYST BP LT 130 MM HG: CPT | Performed by: INTERNAL MEDICINE

## 2025-01-24 PROCEDURE — 81332 SERPINA1 GENE: CPT

## 2025-01-24 PROCEDURE — 3078F DIAST BP <80 MM HG: CPT | Performed by: INTERNAL MEDICINE

## 2025-01-24 RX ORDER — ONDANSETRON 4 MG/1
1 TABLET, FILM COATED ORAL 3 TIMES DAILY
COMMUNITY
Start: 2025-01-15

## 2025-01-24 RX ORDER — LISINOPRIL 10 MG/1
1 TABLET ORAL DAILY
COMMUNITY
End: 2025-01-24

## 2025-01-24 RX ORDER — ALBUTEROL SULFATE 90 UG/1
2 INHALANT RESPIRATORY (INHALATION) EVERY 4 HOURS PRN
Qty: 6.7 G | Refills: 5 | Status: SHIPPED | OUTPATIENT
Start: 2025-01-24

## 2025-01-24 RX ORDER — ATORVASTATIN CALCIUM 80 MG/1
80 TABLET, FILM COATED ORAL EVERY EVENING
COMMUNITY
Start: 2025-01-08

## 2025-01-24 RX ORDER — CLOBETASOL PROPIONATE 0.5 MG/G
CREAM TOPICAL
COMMUNITY
Start: 2024-10-17

## 2025-01-24 RX ORDER — SPIRONOLACTONE 25 MG/1
TABLET ORAL
COMMUNITY
Start: 2025-01-21

## 2025-01-24 RX ORDER — LORATADINE 10 MG/1
TABLET ORAL
COMMUNITY
Start: 2025-01-21

## 2025-01-24 NOTE — PROGRESS NOTES
New Pulmonary Patient Office Visit      Patient Name: Shilpa Vides    Referring Physician: Maynor Childs*    Chief Complaint:    Chief Complaint   Patient presents with    Breathing Problem    Consult       History of Present Illness: Shilpa Vides is a 59 y.o. female who is here today to establish care with Pulmonary.        Duration: COPD diagnosed in 2024 after several months of shortness of breath   Severity: Moderate  Associated Symptoms: Chronic, dry cough, intermittent wheezing, moderate shortness of breath sometimes have posterior chest pain when breathing hard, but resolves within a few minutes  Exercise Tolerance: able to go to the grocery store and pushes her own cart for 20 minutes which is fine, but has trouble when unloading grocery  Timing: daily  Exacerbating Factors: exertion, cold air   Relieving Factors: rest and inhalers    Current Regimen:   Inhalers: none  Nebs:none    Supplemental Oxygen: none  DME Company:     Last exacerbation: Jan 2025 and treated with antibiotics    No personal history of asthma. Brother has asthma.     Subjective      Review of Systems:   Review of Systems   Respiratory:  Positive for cough and shortness of breath.    Cardiovascular:  Positive for chest pain.       Past Medical History:   Past Medical History:   Diagnosis Date    Arthritis     Back pain     Cataract 05/20/2021    Elevated cholesterol     Frequent headaches     Hyperlipidemia     Hypertension     Myocardial infarction     PONV (postoperative nausea and vomiting)        Past Surgical History:   Past Surgical History:   Procedure Laterality Date    CARDIAC CATHETERIZATION N/A 06/18/2021    Procedure: CORONARY ANGIOGRAPHY;  Surgeon: William Camejo IV, MD;  Location: [facility] INVASIVE LOCATION;  Service: Cardiovascular;  Laterality: N/A;    CATARACT EXTRACTION WITH INTRAOCULAR LENS IMPLANT Left 05/20/2021    CHOLECYSTECTOMY      COLONOSCOPY      COLONOSCOPY N/A 05/21/2021     Procedure: COLONOSCOPY;  Surgeon: Lynn Durham MD;  Location: [facility] ENDOSCOPY;  Service: Gastroenterology;  Laterality: N/A;    EYE SURGERY      HYSTERECTOMY  2012    OOPHORECTOMY         Family History:   Family History   Problem Relation Age of Onset    Heart disease Mother     Diabetes Mother     Breast cancer Mother     Heart disease Father     Diabetes Father     Colon cancer Neg Hx     Esophageal cancer Neg Hx     Liver cancer Neg Hx     Liver disease Neg Hx     Stomach cancer Neg Hx        Social History:   Social History     Socioeconomic History    Marital status:    Tobacco Use    Smoking status: Never    Smokeless tobacco: Never   Vaping Use    Vaping status: Never Used   Substance and Sexual Activity    Alcohol use: No    Drug use: No    Sexual activity: Defer       Medications:     Current Outpatient Medications:     amoxicillin-clavulanate (AUGMENTIN) 875-125 MG per tablet, Take 1 tablet by mouth 2 (Two) Times a Day., Disp: , Rfl:     ASPIRIN 81 PO, Take 81 mg by mouth Daily., Disp: , Rfl:     atorvastatin (LIPITOR) 80 MG tablet, Take 1 tablet by mouth Every Evening., Disp: , Rfl:     Cholecalciferol (VITAMIN D) 2000 UNITS tablet, Take 1 tablet by mouth Daily., Disp: , Rfl:     clobetasol propionate (TEMOVATE) 0.05 % cream, APPLY A SMALL AMOUNT TO AFFECTED AREA ONCE DAILY, Disp: , Rfl:     clopidogrel (PLAVIX) 75 MG tablet, Take 1 tablet by mouth Daily., Disp: , Rfl:     diazePAM (VALIUM) 5 MG tablet, Take 1 tablet by mouth Daily As Needed., Disp: , Rfl:     loratadine (CLARITIN) 10 MG tablet, , Disp: , Rfl:     meclizine (ANTIVERT) 25 MG tablet, Take 1 tablet by mouth 4 (Four) Times a Day As Needed for dizziness., Disp: 30 tablet, Rfl: 0    metoprolol succinate XL (TOPROL-XL) 25 MG 24 hr tablet, Take 1 tablet by mouth Daily., Disp: , Rfl:     ondansetron (ZOFRAN) 4 MG tablet, Take 1 tablet by mouth 3 times a day., Disp: , Rfl:     sacubitril-valsartan (Entresto) 24-26 MG tablet, Take 1  "tablet by mouth 2 (Two) Times a Day., Disp: , Rfl:     spironolactone (ALDACTONE) 25 MG tablet, , Disp: , Rfl:     albuterol sulfate  (90 Base) MCG/ACT inhaler, Inhale 2 puffs Every 4 (Four) Hours As Needed for Wheezing., Disp: 6.7 g, Rfl: 5    Umeclidinium-Vilanterol (ANORO ELLIPTA) 62.5-25 MCG/ACT aerosol powder  inhaler, Inhale 1 puff Daily., Disp: 1 each, Rfl: 11    Allergies:   Allergies   Allergen Reactions    Prednisone Dizziness    Metronidazole Nausea And Vomiting, Nausea Only and Other (See Comments)       Objective     Physical Exam:  Vital Signs:   Vitals:    01/24/25 1308   BP: 120/72   Pulse: 82   Resp: 18   SpO2: 97%   Weight: 78 kg (172 lb)   Height: 162.6 cm (64\")     ============================  ============================    6 MINUTE WALK TEST    Shilpa Vides   1965             BASELINE   SpO2%: 97 % RA    Heart Rate 82   Blood Pressure 120/72     EXERCISE SpO2% HEART RATE RA or O2 @ LPM   1 MINUTE 97 85 ra   2 MINUTES 96 96 ra   3 MINUTES 96 96 ra   4 MINUTES 95 101 ra   5 MINUTES 97 95 ra   6 MINUTES 97 97 ra   (Number of laps: 6 X 36 meters + Final partial lap: 0 meters = 216 meters)            Distance Walked:  216 Meters   SpO2% Post Exercise:  97 % RA   HR Post Exercise:  79     Reason to stop (if applicable):   ____ Chest Pain   ____ Light Headedness   ____ Dyspnea Unrelieved by Rest   ____ Abnormal Gait Pattern   ____ Severe Fatigue   ____ Other (Specify: __________________________)    Tech Comments (if any): none     Test performed by: rr    ============================  ============================   Physical Exam  Vitals and nursing note reviewed.   Constitutional:       Appearance: She is well-developed.   HENT:      Head: Normocephalic and atraumatic.      Right Ear: Tympanic membrane and external ear normal.      Left Ear: Tympanic membrane and external ear normal.      Nose: Nose normal. No congestion or rhinorrhea.      Mouth/Throat:      Mouth: Mucous membranes " "are moist.      Pharynx: Oropharynx is clear. No oropharyngeal exudate or posterior oropharyngeal erythema.   Eyes:      General:         Right eye: No discharge.         Left eye: No discharge.      Extraocular Movements: Extraocular movements intact.      Conjunctiva/sclera: Conjunctivae normal.   Neck:      Trachea: No tracheal deviation.   Cardiovascular:      Rate and Rhythm: Normal rate and regular rhythm.      Heart sounds: No murmur heard.  Pulmonary:      Effort: No respiratory distress.      Breath sounds: No wheezing or rhonchi.   Abdominal:      General: There is no distension.      Palpations: Abdomen is soft.   Musculoskeletal:         General: No tenderness. Normal range of motion.      Cervical back: Normal range of motion and neck supple.      Right lower leg: No edema.      Left lower leg: No edema.   Skin:     General: Skin is warm and dry.      Findings: No rash.   Neurological:      Mental Status: She is alert and oriented to person, place, and time.      Coordination: Coordination normal.   Psychiatric:         Mood and Affect: Mood normal.         Judgment: Judgment normal.       Mallampati Score: II (hard and soft palate, upper portion of tonsils anduvula visible)    Results Review:   Labs: Reviewed.  Lab Results   Component Value Date    WBC 7.59 06/03/2024    HGB 15.5 06/03/2024    HCT 47.0 (H) 06/03/2024    MCV 91.4 06/03/2024     06/03/2024   Eosinophils 2.5%, absolute 0.19  Lab Results   Component Value Date    GLUCOSE 92 06/03/2024    BUN 10 06/03/2024    CREATININE 0.83 06/03/2024     06/03/2024    K 4.1 06/03/2024     06/03/2024    CALCIUM 9.8 06/03/2024    PROTEINTOT 7.3 06/03/2024    ALBUMIN 4.6 06/03/2024    ALT 19 06/03/2024    AST 22 06/03/2024    ALKPHOS 96 06/03/2024    BILITOT 0.6 06/03/2024    GLOB 2.7 06/03/2024    AGRATIO 1.7 06/03/2024    BCR 12.0 06/03/2024    ANIONGAP 9.3 06/03/2024    EGFR 81.3 06/03/2024       No results found for: \"CBCDIF\", \"CMP\" " "    Micro: As of January 24, 2025   No results found for: \"RESPCX\"  No results found for: \"BLOODCX\"  No results found for: \"URINECX\"  No results found for: \"MRSACX\"  No results found for: \"MRSAPCR\"  Lab Results   Component Value Date    URCX Not Indicated 03/23/2021     No components found for: \"LOWRESPCF\"  No results found for: \"THROATCX\"  No results found for: \"CULTURES\"  No components found for: \"STREPBCX\"  No results found for: \"STREPPNEUAG\"  No results found for: \"LEGIONELLA\"  No results found for: \"MYCOPLASCX\"  No results found for: \"GCCX\"  No results found for: \"WOUNDCX\"  No results found for: \"BODYFLDCX\"    ABG: No results found for: \"PHART\", \"ARI0LGI\", \"PO2ART\", \"HGBBG\", \"S4KPVOBV\", \"CFIO2\", \"FCOHB\", \"CARBOXYHGB\", \"FMETHB\"    Echo: Results for orders placed in visit on 06/11/24    Adult Transthoracic Echo Complete W/ Cont if Necessary Per Protocol    Interpretation Summary    Left ventricular systolic function is normal. Estimated left ventricular EF = 55% Left ventricular ejection fraction appears to be 51 - 55%.    Left ventricular diastolic function is consistent with (grade I) impaired relaxation.      Results for orders placed during the hospital encounter of 06/18/21    Adult Transthoracic Echo Complete W/ Cont if Necessary Per Protocol    Interpretation Summary  · Left ventricular systolic function is moderately decreased. Estimated left ventricular EF = 30%. Wall motion abnormality consistent with stress-induced (Takotsubo) cardiomyopathy.  · The cardiac valves are anatomically and functionally normal.      Radiology Scans:   Last CT scan was reviewed in great detail with the patient. Images reviewed personally.     Results for orders placed during the hospital encounter of 06/03/24    CT Angiogram Chest Pulmonary Embolism    Narrative  PROCEDURE: CT ANGIOGRAM CHEST PULMONARY EMBOLISM-    HISTORY: soa/chest pain    TECHNIQUE: Thin section axial CT with IV contrast supplemented with 3D  reconstructed " MIP images.    FINDINGS:    Pulmonary vessels enhance in a normal fashion without evidence of  embolic disease. Thoracic aorta is normal in caliber without evidence of  aneurysm or dissection.    No acute lung disease is present.  No pleural or pericardial effusion is  seen. No adenopathy or mass lesion is present.    Parapelvic left renal cysts are present.    Impression  1. No evidence of pulmonary embolism.        This study was performed with techniques to keep radiation doses as low  as reasonably achievable (ALARA). Individualized dose reduction  techniques using automated exposure control or adjustment of vA and/or  kV according to the patient size were employed.    This report was signed and finalized on 6/3/2024 12:10 PM by Joaquin Caal MD.      No results found for this or any previous visit.      No results found for this or any previous visit.      No results found for this or any previous visit.      No results found for this or any previous visit.      No results found for this or any previous visit.      No results found for this or any previous visit.      No results found for this or any previous visit.      No results found for this or any previous visit.      Results for orders placed during the hospital encounter of 11/13/24    CT Chest Without Contrast Diagnostic    Narrative  PROCEDURE: CT CHEST WO CONTRAST DIAGNOSTIC-    HISTORY: SOB AND COUGH; R06.02-Shortness of breath; R05.9-Cough,  unspecified    COMPARISON: None.    PROCEDURE: Axial images were obtained from the lung apex to the mid  abdomen by computed tomography. This study was performed with techniques  to keep radiation doses as low as reasonably achievable, (ALARA).  Individualized dose reduction techniques using automated exposure  control or adjustment of mA and/or kV according to the patient size were  employed.    FINDINGS:    CHEST: There is no suspicious axillary adenopathy. There is no  suspicious hilar or mediastinal  adenopathy. The heart is proper size.  There is no pericardial or pleural effusion.No suspicious infiltrate or  nodule identified. Limited images of the upper abdomen demonstrate 2  tiny nonobstructing stones right kidney; superior pole stone was seen on  the prior exam. Inferior pole is not included on the previous study.  There appear to be parapelvic cysts on the left which appears similar to  prior. Vascular calcifications noted. There is evidence of old calcified  granulomatous disease.    Impression  No acute cardiopulmonary process.            CTDI: 5.21 mGy  DLP:186.2 mGy.cm    This report was signed and finalized on 11/13/2024 3:59 PM by Rose Asencio MD.      No results found for this or any previous visit.      No results found for this or any previous visit.      No results found for this or any previous visit.      No results found for this or any previous visit.      No results found for this or any previous visit.      No results found for this or any previous visit.         PFT IMPRESSION:   November 2024 PFT showed FEV1 61%, FEV1/FVC ratio 67.  No air trapping noted.    Assessment / Plan      Assessment/Plan:    1. Moderate COPD (chronic obstructive pulmonary disease)  Patient with what appears to be moderate COPD based on last PFTs.  Did not get bronchodilator responsiveness testing due to not wanting to use albuterol.  Never smoker.  Will go ahead and start LAMA/LABA.  Check alpha-1 antitrypsin testing.  Her history of elevated eosinophil count also raises the question of possibly being an asthma type picture and will consider repeat PFTs if symptoms persist after LAMA LABA trial  Repeat lab work to see if eosinophil level is still elevated.    - Umeclidinium-Vilanterol (ANORO ELLIPTA) 62.5-25 MCG/ACT aerosol powder  inhaler; Inhale 1 puff Daily.  Dispense: 1 each; Refill: 11  - albuterol sulfate  (90 Base) MCG/ACT inhaler; Inhale 2 puffs Every 4 (Four) Hours As Needed for Wheezing.   Dispense: 6.7 g; Refill: 5  - Alpha - 1 - Antitrypsin Deficiency; Future  - CBC Auto Differential; Future  - 6 Minute Walk Test; Future    2. Non-seasonal allergic rhinitis, unspecified trigger  Chronic allergies.  Check labs.  Continue daily antihistamines  - CBC Auto Differential; Future  - Regional Allergen Zone 8 / With IgE; Future    3. Eosinophil count raised  Noted to be elevated last year.  Repeat labs.  - CBC Auto Differential; Future       Follow Up:   Return in about 3 months (around 4/24/2025).    Whitley Vega MD  Pulmonary/Critical Care Physician   [place]    This is electronically signed by Whitley Vega MD  01/24/2025 13:12 EST       Please note that portions of this note may have been completed with a voice recognition program. Efforts were made to edit the dictations, but occasionally words are mistranscribed.

## 2025-01-27 ENCOUNTER — PATIENT ROUNDING (BHMG ONLY) (OUTPATIENT)
Dept: PULMONOLOGY | Facility: CLINIC | Age: 60
End: 2025-01-27
Payer: COMMERCIAL

## 2025-01-29 LAB
A ALTERNATA IGE QN: <0.1 KU/L
A FUMIGATUS IGE QN: <0.1 KU/L
AMER ROACH IGE QN: <0.1 KU/L
BAHIA GRASS IGE QN: <0.1 KU/L
BERMUDA GRASS IGE QN: <0.1 KU/L
BOXELDER IGE QN: <0.1 KU/L
C HERBARUM IGE QN: <0.1 KU/L
CAT DANDER IGE QN: <0.1 KU/L
CMN PIGWEED IGE QN: <0.1 KU/L
COMMON RAGWEED IGE QN: <0.1 KU/L
CONV CLASS DESCRIPTION: NORMAL
D FARINAE IGE QN: <0.1 KU/L
D PTERONYSS IGE QN: <0.1 KU/L
DOG DANDER IGE QN: <0.1 KU/L
ENGL PLANTAIN IGE QN: <0.1 KU/L
HAZELNUT POLN IGE QN: <0.1 KU/L
IGE SERPL-ACNC: 7 IU/ML (ref 6–495)
JOHNSON GRASS IGE QN: <0.1 KU/L
KENT BLUE GRASS IGE QN: <0.1 KU/L
LONDON PLANE IGE QN: <0.1 KU/L
M RACEMOSUS IGE QN: <0.1 KU/L
MT JUNIPER IGE QN: <0.1 KU/L
MUGWORT IGE QN: <0.1 KU/L
NETTLE IGE QN: <0.1 KU/L
P NOTATUM IGE QN: <0.1 KU/L
S BOTRYOSUM IGE QN: <0.1 KU/L
SHEEP SORREL IGE QN: <0.1 KU/L
SWEET GUM IGE QN: <0.1 KU/L
WHITE ELM IGE QN: <0.1 KU/L
WHITE HICKORY IGE QN: <0.1 KU/L
WHITE MULBERRY IGE QN: <0.1 KU/L
WHITE OAK IGE QN: <0.1 KU/L

## 2025-01-31 LAB
LAB DIRECTOR NAME PROVIDER: NORMAL
SERPINA1 GENE MUT ANL BLD/T: NORMAL
SERPINA1 GENE MUT TESTED BLD/T: NORMAL

## 2025-03-27 ENCOUNTER — TRANSCRIBE ORDERS (OUTPATIENT)
Dept: GENERAL RADIOLOGY | Facility: HOSPITAL | Age: 60
End: 2025-03-27
Payer: COMMERCIAL

## 2025-03-27 ENCOUNTER — HOSPITAL ENCOUNTER (OUTPATIENT)
Dept: GENERAL RADIOLOGY | Facility: HOSPITAL | Age: 60
Discharge: HOME | End: 2025-03-27
Admitting: NURSE PRACTITIONER
Payer: COMMERCIAL

## 2025-03-27 DIAGNOSIS — R31.9 HEMATURIA, UNSPECIFIED TYPE: ICD-10-CM

## 2025-03-27 DIAGNOSIS — R31.9 HEMATURIA, UNSPECIFIED TYPE: Primary | ICD-10-CM

## 2025-03-27 PROCEDURE — 74018 RADEX ABDOMEN 1 VIEW: CPT

## 2025-08-04 ENCOUNTER — TELEPHONE (OUTPATIENT)
Dept: CARDIOLOGY | Facility: CLINIC | Age: 60
End: 2025-08-04
Payer: COMMERCIAL

## (undated) DEVICE — CATH F6 ST JL 3.5 100CM: Brand: SUPERTORQUE

## (undated) DEVICE — VLV SXN AIR/H2O ORCAPOD3 1P/U STRL

## (undated) DEVICE — CATH F6 ST JR 4 100CM: Brand: SUPERTORQUE

## (undated) DEVICE — LUBE JELLY PK/2.75GM STRL BX/144

## (undated) DEVICE — CATH DIAG EXPO .052 PIG145 6F 110CM

## (undated) DEVICE — ASMBL SPK CONTRST CONTRL

## (undated) DEVICE — SYR LUER SLPTP 50ML

## (undated) DEVICE — TR BAND RADIAL ARTERY COMPRESSION DEVICE: Brand: TR BAND

## (undated) DEVICE — TBG INJ CONTRL FLXPOLY WKEEP RA 1200PSI

## (undated) DEVICE — GW EMR FIX EXCHG J STD .035 3MM 260CM

## (undated) DEVICE — GLIDESHEATH SLENDER STAINLESS STEEL KIT: Brand: GLIDESHEATH SLENDER

## (undated) DEVICE — CONTRL CONTRST CHMBRD W/TBG72IN REUS

## (undated) DEVICE — CVR PROB ULTRASND GLS STRL

## (undated) DEVICE — GW INQWIRE FC PTFE STD J/1.5 .035 260

## (undated) DEVICE — ENDOSCOPY PORT CONNECTOR FOR OLYMPUS® SCOPES: Brand: ERBE

## (undated) DEVICE — MEDI-VAC NON-CONDUCTIVE SUCTION TUBING: Brand: CARDINAL HEALTH

## (undated) DEVICE — Device

## (undated) DEVICE — HYBRID TUBING/CAP SET FOR OLYMPUS® SCOPES: Brand: ERBE

## (undated) DEVICE — PAD GRND REM POLYHESIVE A/ DISP

## (undated) DEVICE — 12CC CONTROL SYRINGE – FR/TR/RA W/RESERVOIR: Brand: CONTROL SYRINGE

## (undated) DEVICE — GLV SURG SENSICARE W/ALOE PF LF 7.5 STRL